# Patient Record
Sex: MALE | Race: WHITE | NOT HISPANIC OR LATINO | Employment: OTHER | URBAN - METROPOLITAN AREA
[De-identification: names, ages, dates, MRNs, and addresses within clinical notes are randomized per-mention and may not be internally consistent; named-entity substitution may affect disease eponyms.]

---

## 2017-06-09 ENCOUNTER — OFFICE VISIT (OUTPATIENT)
Dept: INTERNAL MEDICINE | Age: 62
End: 2017-06-09

## 2017-06-09 VITALS
OXYGEN SATURATION: 96 % | TEMPERATURE: 98.4 F | HEIGHT: 70 IN | HEART RATE: 70 BPM | DIASTOLIC BLOOD PRESSURE: 70 MMHG | BODY MASS INDEX: 29.92 KG/M2 | SYSTOLIC BLOOD PRESSURE: 122 MMHG | WEIGHT: 209 LBS

## 2017-06-09 DIAGNOSIS — Z12.11 SCREENING FOR COLON CANCER: ICD-10-CM

## 2017-06-09 DIAGNOSIS — M19.041 PRIMARY OSTEOARTHRITIS OF RIGHT HAND: Chronic | ICD-10-CM

## 2017-06-09 DIAGNOSIS — J44.9 CHRONIC OBSTRUCTIVE PULMONARY DISEASE, UNSPECIFIED COPD TYPE (HCC): Primary | Chronic | ICD-10-CM

## 2017-06-09 DIAGNOSIS — K21.9 GASTROESOPHAGEAL REFLUX DISEASE, ESOPHAGITIS PRESENCE NOT SPECIFIED: ICD-10-CM

## 2017-06-09 DIAGNOSIS — Z23 NEED FOR PNEUMOCOCCAL VACCINE: ICD-10-CM

## 2017-06-09 DIAGNOSIS — F17.219 CIGARETTE NICOTINE DEPENDENCE WITH NICOTINE-INDUCED DISORDER: Chronic | ICD-10-CM

## 2017-06-09 PROBLEM — J45.909 ASTHMA: Chronic | Status: ACTIVE | Noted: 2017-06-09

## 2017-06-09 PROBLEM — M19.049 PRIMARY OSTEOARTHRITIS OF HAND: Chronic | Status: ACTIVE | Noted: 2017-06-09

## 2017-06-09 PROCEDURE — 90471 IMMUNIZATION ADMIN: CPT | Performed by: INTERNAL MEDICINE

## 2017-06-09 PROCEDURE — 90670 PCV13 VACCINE IM: CPT | Performed by: INTERNAL MEDICINE

## 2017-06-09 PROCEDURE — 99204 OFFICE O/P NEW MOD 45 MIN: CPT | Performed by: INTERNAL MEDICINE

## 2017-06-09 RX ORDER — ASPIRIN 81 MG/1
81 TABLET ORAL DAILY
COMMUNITY
Start: 2017-06-09 | End: 2022-02-27 | Stop reason: HOSPADM

## 2017-06-09 NOTE — PROGRESS NOTES
"Mark L Sturgeon / 61 y.o. / male  2017    VITALS:    /70  Pulse 70  Temp 98.4 °F (36.9 °C)  Ht 70\" (177.8 cm)  Wt 209 lb (94.8 kg)  SpO2 96%  BMI 29.99 kg/m2  BP Readings from Last 3 Encounters:   17 122/70   16 136/77     Wt Readings from Last 3 Encounters:   17 209 lb (94.8 kg)   16 207 lb (93.9 kg)      Body mass index is 29.99 kg/(m^2).    CC: Main reason(s) for today's visit: Establish Care ( former Gertrudis wood pt)      HPI:    Patient is a 61 y.o. male who is here to establish care.     >45 years of smoking history. Sees pulmonologist for asthma/copd. Has h/o pulmonary nodule which has been stable on serial CT scans. C/o cough, wheezing, dyspnea. Has not committed to quitting smoking. Has Rx for Chantix at home.    H/o arthritis of right hand, has take etodolac in the past. Also takes ASA daily given by his wife due to concerns of heart attacks in his family hx. Prior h/o heart cath in  w/o significant blockage. Denies angina.     H/o intermittent heartburn, mom  of esophageal cancer, w/o dysphagia or early satiety.  H/o throat polyp resection.    Patient Care Team:  Gertrudis Wood MD as PCP - General (Internal Medicine)  Baylee Galaviz MD as Consulting Physician (Pulmonary Disease)  ____________________________________________________________________    ASSESSMENT & PLAN:    1. Chronic obstructive pulmonary disease, unspecified COPD type  Advised strongly to quit smoking.  Advised to use inhaler prescribed by pulmonologist.    2. Cigarette nicotine dependence with nicotine-induced disorder  Above     3. Gastroesophageal reflux disease, esophagitis presence not specified  - Ambulatory referral for Screening EGD (together w/ screening CLS)  - advised to stop smoking, reduce etoh    4. Primary osteoarthritis of right hand  Take tylenol as needed    5. Need for pneumococcal vaccine  - Pneumococcal Conjugate Vaccine 13-Valent All    6. Screening " for colon cancer  - Ambulatory Referral For Screening Colonoscopy       Summary/Discussion:     ·  Spent 50 minutes in face-to-face encounter time and >50% of time spent in counseling re: above medical issues.  -Counseled on smoking cessation and strongly advised him to quit. (5 minutes)  -Discussed treatment options including nicotine replacement therapy, Wellbutrin, and Chantix.  -Discussed risks/dangers of smoking including risk for various cancers, lung disease and cardiovascular disease.    -Advised to watch for suicidal ideations, depression, increased anxiety/agitation, mood swings. Instructed to contact me if any problems arise.  · -F/U with smoking status on next visit       Return in about 4 months (around 10/9/2017) for Annual physical.    No future appointments.    ____________________________________________________________________    REVIEW OF SYSTEMS    Review of Systems   Constitutional: Negative.    HENT: Positive for voice change (stable).    Eyes: Negative.    Respiratory: Positive for cough, shortness of breath and wheezing.    Cardiovascular: Negative.  Negative for chest pain.   Gastrointestinal: Negative.         Heartburn    Endocrine: Negative.    Genitourinary: Negative.    Musculoskeletal: Negative.  Arthralgias: right hand.   Skin: Negative.    Allergic/Immunologic: Negative.    Neurological: Negative.    Hematological: Negative.    Psychiatric/Behavioral: Negative.      Other: As noted per HPI      PHYSICAL EXAMINATION    Physical Exam   Constitutional: He appears well-nourished. No distress.   Obese     HENT:   Head: Normocephalic.   Right Ear: Right ear foreign body: wax.   Left Ear: External ear normal.   Mouth/Throat: Oropharynx is clear and moist.   Eyes: Conjunctivae are normal. No scleral icterus.   Neck: Neck supple. No tracheal deviation present. No thyromegaly present.   Cardiovascular: Normal rate, regular rhythm, normal heart sounds and intact distal pulses.  Exam reveals no  gallop and no friction rub.    No murmur heard.  Pulmonary/Chest: Effort normal and breath sounds normal. Wheezes: left    Abdominal: Soft. Bowel sounds are normal. He exhibits no distension and no mass. There is no tenderness. No hernia.   Musculoskeletal: He exhibits no edema or deformity.   Lymphadenopathy:     He has no cervical adenopathy.        Right: No supraclavicular adenopathy present.        Left: No supraclavicular adenopathy present.   Neurological: He is alert. He has normal reflexes.   Skin: Skin is intact. No rash noted. No cyanosis or erythema. No pallor. Nails show no clubbing.   Psychiatric: He has a normal mood and affect. His behavior is normal. Judgment and thought content normal. Cognition and memory are normal.       REVIEWED DATA:    Labs:   Lab Results   Component Value Date     08/29/2014    K 4.3 08/29/2014    BUN 15 08/29/2014    CREATININE 0.80 08/29/2014       No results found for: GLU, HGBA1C, MICROALBUR    No results found for: LDL, HDL, TRIG, CHOLHDLRATIO    No results found for: TSH, FREET4       Lab Results   Component Value Date    WBC 9.54 08/29/2014    HGB 14.8 08/29/2014     08/29/2014         Imaging:        Medical Tests:        Summary of old records / correspondence / consultant report:        Request outside records:    From prior PCP (sign medical release)    ______________________________________________________________________    No Known Allergies    Current Outpatient Prescriptions   Medication Sig Dispense Refill   • aspirin 81 MG EC tablet Take 1 tablet by mouth Daily.     • Multiple Vitamins-Minerals (MULTIVITAMIN PO) Take  by mouth.       No current facility-administered medications for this visit.        PFSH:     The following portions of the patient's history were reviewed and updated as appropriate: Allergies / Current Medications / Past Medical History / Surgical History / Social History / Family History    Patient Active Problem List    Diagnosis   • Primary osteoarthritis of hand   • COPD (chronic obstructive pulmonary disease)   • Cigarette nicotine dependence with nicotine-induced disorder       Past Medical History:   Diagnosis Date   • Asthma    • Pulmonary nodule        Past Surgical History:   Procedure Laterality Date   • HAND SURGERY     • THROAT SURGERY  2012    polyp removal   • TONSILLECTOMY     • VASECTOMY         Social History     Social History   • Marital status:      Spouse name: Zeenat*   • Number of children: 2   • Years of education: N/A     Occupational History   •  ([employer])      Social History Main Topics   • Smoking status: Current Every Day Smoker     Packs/day: 1.00     Years: 45.00     Types: Cigarettes   • Smokeless tobacco: Never Used   • Alcohol use 7.2 oz/week     12 Cans of beer per week   • Drug use: No   • Sexual activity: Yes     Partners: Female     Other Topics Concern   • None     Social History Narrative   • None       Family History   Problem Relation Age of Onset   • Esophageal cancer Mother 68     smoker   • Asthma Mother    • Heart attack Father 50     smoker   • Asthma Sister      smoker   • Lung disease Brother    • Asthma Maternal Grandmother    • Diabetes Maternal Grandmother    • Heart attack Maternal Grandfather 75         **Aidan Disclaimer:   Much of this encounter note is an electronic transcription/translation of spoken language to printed text. The electronic translation of spoken language may permit erroneous, or at times, nonsensical words or phrases to be inadvertently transcribed. Although I have reviewed the note for such errors, some may still exist.

## 2017-06-14 DIAGNOSIS — Z12.11 ENCOUNTER FOR SCREENING COLONOSCOPY: Primary | ICD-10-CM

## 2017-06-14 NOTE — H&P
Date: 2017     Patient: Mark L Sturgeon    : 1955   [id number]     CC:  Screening Colonoscopy    History:   The patient is a 61 y.o. male of Gertrudis Wood MD  who presents to discuss screening colonoscopy. The patient currently has no complaints.  Patient denies any history of nausea, abdominal pain, weight loss, change in bowel habits or rectal bleeding.  Patient denies melena, hematochezia or BRBPR.  No family history of ulcerative colitis, Crohn's disease, familial polyposis or colon cancer.    The following portions of the patient's history were reviewed and updated as appropriate: allergies, current medications, past family history, past medical history, past social history, past surgical history and problem list.    Past Medical History:   Diagnosis Date   • Asthma    • Pulmonary nodule       Past Surgical History:   Procedure Laterality Date   • HAND SURGERY     • THROAT SURGERY      polyp removal   • TONSILLECTOMY     • VASECTOMY       Medications:   (Not in a hospital admission)  Allergies: Review of patient's allergies indicates no known allergies.   Social History:  Social History     Social History   • Marital status:      Spouse name: Zeenat*   • Number of children: 2   • Years of education: N/A     Occupational History   •  ([employer])      Social History Main Topics   • Smoking status: Current Every Day Smoker     Packs/day: 1.00     Years: 45.00     Types: Cigarettes   • Smokeless tobacco: Never Used   • Alcohol use 7.2 oz/week     12 Cans of beer per week   • Drug use: No   • Sexual activity: Yes     Partners: Female     Other Topics Concern   • None     Social History Narrative      Family History   Problem Relation Age of Onset   • Esophageal cancer Mother 68     smoker   • Asthma Mother    • Heart attack Father 50     smoker   • Asthma Sister      smoker   • Lung disease Brother    • Asthma Maternal Grandmother    • Diabetes Maternal  Grandmother    • Heart attack Maternal Grandfather 75        Review of Systems:   General: Patient reports good health  Eyes: No eye problems  Ears, nose, mouth and throat: No rhinitis, no hearing problems, no chronic cough  Cardiovascular/heart: Denies palpitations, syncope or chest pain  Respiratory/lung: Denies shortness of breath, hemoptysis, or dyspnea on exertion   Genital/urinary: No frequency, hematuria or dysuria  Hematological/lymphatic: Denies anemia or other problems  Musculoskeletal: No joint pain, no defects  Skin: No psoriasis or other skin issues  Neurological: No seizures or other neurological problems  Psychiatric: None  Endocrine: Negative  Gastro-intestinal: No constipation, no diarrhea, no melena, no hematochezia    Physical Examination:  General: Alert and oriented x3 in no acute distress  HEENT: Normal cephalic, atraumatic, PERRLA, EOMI, sclera anicteric, moist mucous membranes, neck is supple, no JVD, no carotid bruits, no thyromegaly no adenopathy  Chest: CTA and percussion  CVA: RRR, normal S1-S2, no murmurs, no gallops or rubs  Abdomen: Positive BS, soft, nondistended, nontender, no rebound, no guarding, no hernias, no organomegaly and no masses  Extremities: Full range of motion, no clubbing, no cyanosis or edema  Neurovascular: Grossly intact    Impression:  61 y.o. male for screening colonoscopy.    Plan:  Patient is presenting for screening colonoscopy.  I have recommended that the patient undergo a screening colonoscopy in accordance of [employer] Society's guidelines.  I have discussed this procedure in detail with the patient.  I have discussed the risks, benefits and alternatives.  I have discussed the risk of anesthesia, bleeding and perforation.  Patient understands these risks, benefits and alternatives and wishes to proceed.      Norma Brady MD  General, Minimally Invasive and Endoscopic Surgery  [facility]    [address], [address]  [facility] [place]  [place], [address]  P: [phone]  F: [phone]    CC: Gertrudis Wood MD

## 2017-06-27 DIAGNOSIS — K21.00 GASTROESOPHAGEAL REFLUX DISEASE WITH ESOPHAGITIS: Primary | ICD-10-CM

## 2017-08-10 ENCOUNTER — TRANSCRIBE ORDERS (OUTPATIENT)
Dept: GASTROENTEROLOGY | Facility: CLINIC | Age: 62
End: 2017-08-10

## 2017-08-10 ENCOUNTER — TELEPHONE (OUTPATIENT)
Dept: GASTROENTEROLOGY | Facility: CLINIC | Age: 62
End: 2017-08-10

## 2017-08-10 DIAGNOSIS — Z12.11 ENCOUNTER FOR SCREENING FOR MALIGNANT NEOPLASM OF COLON: Primary | ICD-10-CM

## 2017-08-10 NOTE — TELEPHONE ENCOUNTER
Received OA paperwork from patient and noticed that Dr Tyler's referral was sent to Dr Brady.  Spoke with patient and he said that he wants Dr Lawson to do his colonoscopy not Dr Brady.  Patient said that Dr Tyler also felt that he should have an EGD but he is really not having any issues with Gerd.  Spoke with Dr Lawson and he said that patient does not have any indications for an EGD at this time.  Patient okay with just scheduling a colonoscopy.

## 2017-08-30 PROBLEM — Z12.11 ENCOUNTER FOR SCREENING FOR MALIGNANT NEOPLASM OF COLON: Status: ACTIVE | Noted: 2017-08-30

## 2017-09-28 DIAGNOSIS — Z00.00 PREVENTATIVE HEALTH CARE: Primary | ICD-10-CM

## 2017-10-03 LAB
25(OH)D3+25(OH)D2 SERPL-MCNC: 32 NG/ML (ref 30–100)
ALBUMIN SERPL-MCNC: 4.5 G/DL (ref 3.5–5.2)
ALBUMIN/GLOB SERPL: 1.6 G/DL
ALP SERPL-CCNC: 68 U/L (ref 39–117)
ALT SERPL-CCNC: 15 U/L (ref 1–41)
APPEARANCE UR: CLEAR
AST SERPL-CCNC: 14 U/L (ref 1–40)
BASOPHILS # BLD AUTO: 0.02 10*3/MM3 (ref 0–0.2)
BASOPHILS NFR BLD AUTO: 0.2 % (ref 0–1.5)
BILIRUB SERPL-MCNC: <0.2 MG/DL (ref 0.1–1.2)
BILIRUB UR QL STRIP: NEGATIVE
BUN SERPL-MCNC: 15 MG/DL (ref 8–23)
BUN/CREAT SERPL: 16.7 (ref 7–25)
CALCIUM SERPL-MCNC: 10.1 MG/DL (ref 8.6–10.5)
CHLORIDE SERPL-SCNC: 100 MMOL/L (ref 98–107)
CHOLEST SERPL-MCNC: 201 MG/DL (ref 0–200)
CHOLEST/HDLC SERPL: 4.19 {RATIO}
CO2 SERPL-SCNC: 25.8 MMOL/L (ref 22–29)
COLOR UR: YELLOW
CREAT SERPL-MCNC: 0.9 MG/DL (ref 0.76–1.27)
EOSINOPHIL # BLD AUTO: 0.2 10*3/MM3 (ref 0–0.7)
EOSINOPHIL NFR BLD AUTO: 2.2 % (ref 0.3–6.2)
ERYTHROCYTE [DISTWIDTH] IN BLOOD BY AUTOMATED COUNT: 12.8 % (ref 11.5–14.5)
GLOBULIN SER CALC-MCNC: 2.9 GM/DL
GLUCOSE SERPL-MCNC: 140 MG/DL (ref 65–99)
GLUCOSE UR QL: NEGATIVE
HBA1C MFR BLD: 5.92 % (ref 4.8–5.6)
HCT VFR BLD AUTO: 47.2 % (ref 40.4–52.2)
HCV AB S/CO SERPL IA: <0.1 S/CO RATIO (ref 0–0.9)
HDLC SERPL-MCNC: 48 MG/DL (ref 40–60)
HGB BLD-MCNC: 16 G/DL (ref 13.7–17.6)
HGB UR QL STRIP: NEGATIVE
IMM GRANULOCYTES # BLD: 0.04 10*3/MM3 (ref 0–0.03)
IMM GRANULOCYTES NFR BLD: 0.4 % (ref 0–0.5)
KETONES UR QL STRIP: NEGATIVE
LDLC SERPL CALC-MCNC: 125 MG/DL (ref 0–100)
LEUKOCYTE ESTERASE UR QL STRIP: NEGATIVE
LYMPHOCYTES # BLD AUTO: 2.5 10*3/MM3 (ref 0.9–4.8)
LYMPHOCYTES NFR BLD AUTO: 27.1 % (ref 19.6–45.3)
MCH RBC QN AUTO: 32.8 PG (ref 27–32.7)
MCHC RBC AUTO-ENTMCNC: 33.9 G/DL (ref 32.6–36.4)
MCV RBC AUTO: 96.7 FL (ref 79.8–96.2)
MONOCYTES # BLD AUTO: 0.86 10*3/MM3 (ref 0.2–1.2)
MONOCYTES NFR BLD AUTO: 9.3 % (ref 5–12)
NEUTROPHILS # BLD AUTO: 5.59 10*3/MM3 (ref 1.9–8.1)
NEUTROPHILS NFR BLD AUTO: 60.8 % (ref 42.7–76)
NITRITE UR QL STRIP: NEGATIVE
PH UR STRIP: 5.5 [PH] (ref 5–8)
PLATELET # BLD AUTO: 252 10*3/MM3 (ref 140–500)
POTASSIUM SERPL-SCNC: 4.6 MMOL/L (ref 3.5–5.2)
PROT SERPL-MCNC: 7.4 G/DL (ref 6–8.5)
PROT UR QL STRIP: NEGATIVE
PSA SERPL-MCNC: 1.04 NG/ML (ref 0–4)
RBC # BLD AUTO: 4.88 10*6/MM3 (ref 4.6–6)
SODIUM SERPL-SCNC: 141 MMOL/L (ref 136–145)
SP GR UR: 1.02 (ref 1–1.03)
T4 FREE SERPL-MCNC: 1.08 NG/DL (ref 0.93–1.7)
TESTOST SERPL-MCNC: 218 NG/DL (ref 264–916)
TRIGL SERPL-MCNC: 142 MG/DL (ref 0–150)
TSH SERPL DL<=0.005 MIU/L-ACNC: 2.68 MIU/ML (ref 0.27–4.2)
UROBILINOGEN UR STRIP-MCNC: NORMAL MG/DL
VLDLC SERPL CALC-MCNC: 28.4 MG/DL (ref 5–40)
WBC # BLD AUTO: 9.21 10*3/MM3 (ref 4.5–10.7)

## 2017-10-09 ENCOUNTER — OFFICE VISIT (OUTPATIENT)
Dept: INTERNAL MEDICINE | Age: 62
End: 2017-10-09

## 2017-10-09 VITALS
HEIGHT: 70 IN | WEIGHT: 212 LBS | BODY MASS INDEX: 30.35 KG/M2 | HEART RATE: 86 BPM | DIASTOLIC BLOOD PRESSURE: 76 MMHG | SYSTOLIC BLOOD PRESSURE: 124 MMHG | TEMPERATURE: 98.4 F | OXYGEN SATURATION: 96 %

## 2017-10-09 DIAGNOSIS — J44.9 CHRONIC OBSTRUCTIVE PULMONARY DISEASE, UNSPECIFIED COPD TYPE (HCC): Chronic | ICD-10-CM

## 2017-10-09 DIAGNOSIS — E78.00 PURE HYPERCHOLESTEROLEMIA: Chronic | ICD-10-CM

## 2017-10-09 DIAGNOSIS — F17.219 CIGARETTE NICOTINE DEPENDENCE WITH NICOTINE-INDUCED DISORDER: Chronic | ICD-10-CM

## 2017-10-09 DIAGNOSIS — K21.9 GASTROESOPHAGEAL REFLUX DISEASE, ESOPHAGITIS PRESENCE NOT SPECIFIED: ICD-10-CM

## 2017-10-09 DIAGNOSIS — R73.03 PREDIABETES: Chronic | ICD-10-CM

## 2017-10-09 DIAGNOSIS — Z00.00 ENCOUNTER FOR ANNUAL HEALTH EXAMINATION: Primary | ICD-10-CM

## 2017-10-09 DIAGNOSIS — Z00.00 PREVENTATIVE HEALTH CARE: Primary | ICD-10-CM

## 2017-10-09 PROBLEM — E78.5 HYPERLIPIDEMIA: Chronic | Status: ACTIVE | Noted: 2017-10-09

## 2017-10-09 PROCEDURE — 90471 IMMUNIZATION ADMIN: CPT | Performed by: INTERNAL MEDICINE

## 2017-10-09 PROCEDURE — 99396 PREV VISIT EST AGE 40-64: CPT | Performed by: INTERNAL MEDICINE

## 2017-10-09 PROCEDURE — 99214 OFFICE O/P EST MOD 30 MIN: CPT | Performed by: INTERNAL MEDICINE

## 2017-10-09 PROCEDURE — 90686 IIV4 VACC NO PRSV 0.5 ML IM: CPT | Performed by: INTERNAL MEDICINE

## 2017-10-09 NOTE — ASSESSMENT & PLAN NOTE
Maintain low fat/cholesterol diet.  Discussed possible need to start statin medication due to other risk factors. Handout given. He would like to think about it.

## 2017-10-09 NOTE — ASSESSMENT & PLAN NOTE
Spent additional 5 minutes on smoking cessation. Discussed all treatment options. Handout given. Instructed to call if interested in starting Wellbutrin / NRT. He is reluctant to take Chantix.

## 2017-10-09 NOTE — PROGRESS NOTES
Mikal LOWE Sturgeon / 62 y.o. / male  10/09/2017    CC: Main reason(s) for today's visit: Annual Exam      HPI:      Mikal presents for annual health maintenance visit.  Previous history of elevated fasting glucose without history of diabetes.  Still drinks moderately heavy.  Does not follow low carbohydrate diet. He had some cobbler and fried chicken the night before his most recent fasting blood work.  Ongoing gradual weight gain.  History of mild hyperlipidemia with strong family history of heart disease.  His father had a heart attack in his 50s.  Patient is still smoking about 1 pack per day for greater than 45 years.  He is followed by his pulmonologist for COPD.  He does not use Anoro on daily basis. C/o heartburn sx's and concerned about it due to mom's h/o esophageal cancer. Denies dysphagia sx's.     · Last health maintenance visit: 1 year ago  · General health: poor  · Lifestyle:  · Attempting to lose weight?: No   · Diet: needs significant improvement  · Exercise: needs significant improvement  · Tobacco: Regular use (~1 pack/day)   · Alcohol: regular (moderate)  · Work: Full-time  · Reproductive health:  · Sexually active?: Yes   · Concern for STD?: No   · Sexual problems?: No problems   · Sees Urologist?:No   · Depression Screening:      PHQ-2/PHQ-9 Depression Screening 6/9/2017   Little interest or pleasure in doing things 0   Feeling down, depressed, or hopeless 0   Total Score 0         PHQ-2: 0 (Not depressed)     PHQ-9:     Patient Care Team:  Edmundo Tyler MD as PCP - General (Internal Medicine)  Baylee Galaviz MD as Consulting Physician (Pulmonary Disease)  ______________________________________________________________________    ALLERGIES  No Known Allergies     MEDICATIONS  Current Outpatient Prescriptions   Medication Sig Dispense Refill   • aspirin 81 MG EC tablet Take 1 tablet by mouth Daily.     • Multiple Vitamins-Minerals (MULTIVITAMIN PO) Take  by mouth.       No current facility-administered  medications for this visit.        PFSH:     The following portions of the patient's history were reviewed and updated as appropriate: Allergies / Current Medications / Past Medical History / Surgical History / Social History / Family History    PROBLEM LIST   Patient Active Problem List   Diagnosis   • Primary osteoarthritis of hand   • COPD (chronic obstructive pulmonary disease)   • Cigarette nicotine dependence with nicotine-induced disorder   • Encounter for screening for malignant neoplasm of colon   • Prediabetes   • Hyperlipidemia   • Gastroesophageal reflux disease       PAST MEDICAL HISTORY  Past Medical History:   Diagnosis Date   • Asthma    • COPD (chronic obstructive pulmonary disease)    • Pulmonary nodule        SURGICAL HISTORY  Past Surgical History:   Procedure Laterality Date   • HAND SURGERY     • THROAT SURGERY  2012    polyp removal   • TONSILLECTOMY     • VASECTOMY         SOCIAL HISTORY  Social History     Social History   • Marital status:      Spouse name: Zeenat*   • Number of children: 2   • Years of education: N/A     Occupational History   •  ([employer])      Social History Main Topics   • Smoking status: Current Every Day Smoker     Packs/day: 1.00     Years: 45.00     Types: Cigarettes   • Smokeless tobacco: Never Used   • Alcohol use 7.2 oz/week     12 Cans of beer per week   • Drug use: No   • Sexual activity: Yes     Partners: Female     Other Topics Concern   • None     Social History Narrative   • None       FAMILY HISTORY  Family History   Problem Relation Age of Onset   • Esophageal cancer Mother 68     smoker   • Asthma Mother    • Heart attack Father 50     smoker   • Asthma Sister      smoker   • Lung disease Brother    • Asthma Maternal Grandmother    • Diabetes Maternal Grandmother    • Heart attack Maternal Grandfather 75   • Colon cancer Neg Hx    • Prostate cancer Neg Hx        IMMUNIZATION HISTORY  Immunization History   Administered Date(s)  "Administered   • Flu Vaccine Quad PF >36MO 10/09/2017   • Pneumococcal Conjugate 13-Valent 06/09/2017       ______________________________________________________________________    REVIEW OF SYSTEMS    Review of Systems   Constitutional: Negative.    HENT: Negative.    Eyes: Negative.    Respiratory: Positive for shortness of breath and wheezing.    Cardiovascular: Negative.  Negative for chest pain.   Gastrointestinal: Negative.         Heartburn   Endocrine: Negative.  Negative for polydipsia and polyuria.   Genitourinary: Negative.  Negative for hematuria.   Musculoskeletal: Negative.    Skin: Negative.    Allergic/Immunologic: Negative.    Neurological: Negative.    Hematological: Negative.    Psychiatric/Behavioral: Negative.        VITALS:    /76  Pulse 86  Temp 98.4 °F (36.9 °C)  Ht 70\" (177.8 cm)  Wt 212 lb (96.2 kg)  SpO2 96%  BMI 30.42 kg/m2    BP Readings from Last 3 Encounters:   10/09/17 124/76   06/09/17 122/70   09/04/16 136/77     Wt Readings from Last 3 Encounters:   10/09/17 212 lb (96.2 kg)   06/09/17 209 lb (94.8 kg)   09/04/16 207 lb (93.9 kg)      Body mass index is 30.42 kg/(m^2).    PHYSICAL EXAMINATION    Physical Exam   Constitutional: He is oriented to person, place, and time. He appears well-nourished. No distress.   Obese     HENT:   Head: Normocephalic.   Right Ear: External ear normal.   Left Ear: External ear normal.   Nose: Nose normal.   Mouth/Throat: Oropharynx is clear and moist.   Eyes: Conjunctivae and EOM are normal. Pupils are equal, round, and reactive to light. No scleral icterus.   Neck: Normal range of motion. Neck supple. No tracheal deviation present. No thyromegaly present.   Cardiovascular: Normal rate, regular rhythm, normal heart sounds and intact distal pulses.  Exam reveals no gallop and no friction rub.    No murmur heard.  Pulmonary/Chest: Effort normal. No accessory muscle usage. No tachypnea. No respiratory distress. He has decreased breath sounds. " He has wheezes. He has no rhonchi. He has no rales. He exhibits no mass and no tenderness. Right breast exhibits no mass. Left breast exhibits no mass.   Abdominal: Soft. Bowel sounds are normal. He exhibits no distension and no mass. There is no hepatosplenomegaly. There is no tenderness. No hernia. Hernia confirmed negative in the ventral area.   Genitourinary: Rectum normal, prostate normal, testes normal and penis normal.   Musculoskeletal: Normal range of motion. He exhibits no edema or deformity.   Lymphadenopathy:     He has no cervical adenopathy.     He has no axillary adenopathy.        Right: No inguinal and no supraclavicular adenopathy present.        Left: No inguinal and no supraclavicular adenopathy present.   Neurological: He is alert and oriented to person, place, and time. He has normal reflexes. He displays normal reflexes. No cranial nerve deficit. He exhibits normal muscle tone. Coordination normal.   Skin: Skin is warm and intact. No rash noted. No cyanosis or erythema. No pallor. Nails show no clubbing.   Psychiatric: He has a normal mood and affect. His behavior is normal. Judgment and thought content normal. Cognition and memory are normal.       REVIEWED DATA    Labs:    Lab Results   Component Value Date     10/02/2017    K 4.6 10/02/2017    AST 14 10/02/2017    ALT 15 10/02/2017    BUN 15 10/02/2017    CREATININE 0.90 10/02/2017    CREATININE 0.80 08/29/2014    EGFRIFNONA 85 10/02/2017    EGFRIFAFRI 103 10/02/2017       Lab Results   Component Value Date     (H) 10/02/2017    HGBA1C 5.92 (H) 10/02/2017    TSH 2.680 10/02/2017    FREET4 1.08 10/02/2017       Lab Results   Component Value Date    PSA 1.040 10/02/2017    TESTOSTEROTT 218 (L) 10/02/2017       Lab Results   Component Value Date     (H) 10/02/2017    HDL 48 10/02/2017    TRIG 142 10/02/2017    CHOLHDLRATIO 4.19 10/02/2017       No components found for: [id number]    Lab Results   Component Value Date    WBC  9.21 10/02/2017    HGB 16.0 10/02/2017    MCV 96.7 (H) 10/02/2017     10/02/2017       Lab Results   Component Value Date    PROTEIN Negative 10/02/2017    GLUCOSEU Negative 10/02/2017    BLOODU Negative 10/02/2017    NITRITEU Negative 10/02/2017    LEUKOCYTESUR Negative 10/02/2017          Lab Results   Component Value Date    HEPCVIRUSABY <0.1 10/02/2017       Imaging:         Medical Tests:       ______________________________________________________________________    ASSESSMENT & PLAN    ANNUAL WELLNESS EXAM / PHYSICAL     Other medical problems addressed today:  Problem List Items Addressed This Visit     Prediabetes (Chronic)    Current Assessment & Plan     Discussed prediabetes, need for dietary/lifestyle modifications, weight loss.   Decrease Etoh. Consider metformin.          Hyperlipidemia (Chronic)    Current Assessment & Plan     Maintain low fat/cholesterol diet.  Discussed possible need to start statin medication due to other risk factors. Handout given. He would like to think about it.          Gastroesophageal reflux disease (Chronic)    Current Assessment & Plan     Arrange for UGI series. If abnormal, proceed w/ EGD.          Relevant Orders    FL upper gi single contrast w kub    COPD (chronic obstructive pulmonary disease) (Chronic)    Current Assessment & Plan     Sees pulmonologist. Recommended / counseled on smoking cessation. Instructed him to use Anoro inhaler daily. Ask pulmonologist for rescue inhaler.            Cigarette nicotine dependence with nicotine-induced disorder (Chronic)    Current Assessment & Plan     Spent additional 5 minutes on smoking cessation. Discussed all treatment options. Handout given. Instructed to call if interested in starting Wellbutrin / NRT. He is reluctant to take Chantix.            Other Visit Diagnoses     Encounter for annual health examination    -  Primary          Summary/Discussion:     · In addition to completion of CPE, additional 25  minutes were spent on above medical issues and >50% of that time was spent in counseling on those issues.       Return in about 6 months (around 4/9/2018) for Prediabetes, Hyperlipidemia, smoking cessation.    Future Appointments  Date Time Provider Department Center   4/9/2018 2:20 PM MD [facility] None         HEALTHCARE MAINTENANCE ISSUES:    Cancer Screening:  · Colon: Initial/Next screening at age: DUE NOW (He will schedule for 1 month)  · Repeat colonoscopy N/A at this time  · Prostate: Performed today and recommended annual screening  · Testicular: Recommended monthly self exam  · Skin: Monthly self skin examination, annual exam by health professional  · Lung:   · Other:    Screening Labs & Tests:  · Lab results reviewed & discussed with with patient or orders placed today.  · EKG:  · Vascular Screening:   · DEXA (75+ or risk factors):   · HEP C (If born 7950-1716 or risk factors): Negative screen    Immunization/Vaccinations (to be given today unless deferred by patient)  · Tetanus/Pertussis: Up to date  · Pneumovax: Not needed at this time  · Prevnar 13: Up to date  · Zostavax: Up to date  · Influenza: Give flu shot today.   · Other:     Lifestyle Counseling:  · Lifestyle Modifications: Attempt to lose weight, Improve dietary compliance, Begin progressive aerobic exercise program 3-5 days a week, Maintain a low sugar/carbohydrate diet, Follow a low fat, low cholesterol diet, Make dinner the lightest meal of day, Decrease or avoid alcohol intake and Quit smoking  · Safety Issues: Always wear seatbelt, Avoid texting while driving   · Use sunscreen, regular skin examination  · Recommended annual dental/vision examination.  · Emotional/Stress/Sleep: Reviewed and  given when appropriate      Health Maintenance   Topic Date Due   • COLONOSCOPY  06/29/2017   •  Lung Cancer Screening  02/22/2018   • PROSTATE CANCER SCREENING  10/02/2018   • LIPID PANEL  10/02/2018   • TDAP/TD VACCINES (2 -  Td) 05/29/2023   • PNEUMOCOCCAL VACCINE (19-64 MEDIUM RISK)  Completed   • HEPATITIS C SCREENING  Completed   • INFLUENZA VACCINE  Completed   • ZOSTER VACCINE  Completed         **Aidan Disclaimer:   Much of this encounter note is an electronic transcription/translation of spoken language to printed text. The electronic translation of spoken language may permit erroneous, or at times, nonsensical words or phrases to be inadvertently transcribed. Although I have reviewed the note for such errors, some may still exist.

## 2017-10-09 NOTE — ASSESSMENT & PLAN NOTE
Sees pulmonologist. Recommended / counseled on smoking cessation. Instructed him to use Anoro inhaler daily. Ask pulmonologist for rescue inhaler.

## 2017-10-09 NOTE — ASSESSMENT & PLAN NOTE
Discussed prediabetes, need for dietary/lifestyle modifications, weight loss.   Decrease Etoh. Consider metformin.

## 2017-10-23 ENCOUNTER — HOSPITAL ENCOUNTER (OUTPATIENT)
Dept: GENERAL RADIOLOGY | Facility: HOSPITAL | Age: 62
Discharge: HOME | End: 2017-10-23
Admitting: INTERNAL MEDICINE

## 2017-10-23 PROCEDURE — 74247: CPT

## 2017-10-23 NOTE — PROGRESS NOTES
"Call patient (READ EXACTLY AS WRITTEN) with his test result(s) and mail the results to him if MyChart is NOT active.    UGI series showed the followin. Some thickening of the esophageal and stomach region mucosal wall. Possibly due to esophagitis / gastritis (Inflammation).  2. Small sliding hiatal hernia (causing reflux)  3. A large diverticulum of the duodenum (an abnormal \"pouch\" in the portion of the small intestinal wall).    I would recommend proceeding with an EGD for direct visualization of these abnormal findings.  Place referral to GI (1-2 weeks) for EGD."

## 2017-10-24 DIAGNOSIS — K20.90 ESOPHAGITIS: Primary | ICD-10-CM

## 2017-11-07 ENCOUNTER — TRANSCRIBE ORDERS (OUTPATIENT)
Dept: GASTROENTEROLOGY | Facility: CLINIC | Age: 62
End: 2017-11-07

## 2017-11-07 DIAGNOSIS — Z12.11 ENCOUNTER FOR SCREENING FOR MALIGNANT NEOPLASM OF COLON: Primary | ICD-10-CM

## 2017-11-07 DIAGNOSIS — K22.89 THICKENING OF ESOPHAGUS: ICD-10-CM

## 2017-11-07 DIAGNOSIS — K21.9 GASTROESOPHAGEAL REFLUX DISEASE, ESOPHAGITIS PRESENCE NOT SPECIFIED: ICD-10-CM

## 2017-11-21 PROBLEM — K22.89 THICKENING OF ESOPHAGUS: Status: ACTIVE | Noted: 2017-11-21

## 2018-01-22 ENCOUNTER — ANESTHESIA (OUTPATIENT)
Dept: GASTROENTEROLOGY | Facility: HOSPITAL | Age: 63
End: 2018-01-22

## 2018-01-22 ENCOUNTER — HOSPITAL ENCOUNTER (OUTPATIENT)
Facility: HOSPITAL | Age: 63
Setting detail: HOSPITAL OUTPATIENT SURGERY
Discharge: HOME | End: 2018-01-22
Attending: INTERNAL MEDICINE | Admitting: INTERNAL MEDICINE

## 2018-01-22 ENCOUNTER — ANESTHESIA EVENT (OUTPATIENT)
Dept: GASTROENTEROLOGY | Facility: HOSPITAL | Age: 63
End: 2018-01-22

## 2018-01-22 VITALS
HEART RATE: 84 BPM | WEIGHT: 213.9 LBS | OXYGEN SATURATION: 93 % | BODY MASS INDEX: 29.94 KG/M2 | SYSTOLIC BLOOD PRESSURE: 104 MMHG | RESPIRATION RATE: 14 BRPM | HEIGHT: 71 IN | TEMPERATURE: 97.9 F | DIASTOLIC BLOOD PRESSURE: 71 MMHG

## 2018-01-22 DIAGNOSIS — K22.89 THICKENING OF ESOPHAGUS: ICD-10-CM

## 2018-01-22 DIAGNOSIS — K21.9 GASTROESOPHAGEAL REFLUX DISEASE, ESOPHAGITIS PRESENCE NOT SPECIFIED: ICD-10-CM

## 2018-01-22 DIAGNOSIS — Z12.11 ENCOUNTER FOR SCREENING FOR MALIGNANT NEOPLASM OF COLON: ICD-10-CM

## 2018-01-22 PROCEDURE — 43239 EGD BIOPSY SINGLE/MULTIPLE: CPT | Performed by: INTERNAL MEDICINE

## 2018-01-22 PROCEDURE — 25010000002 PROPOFOL 10 MG/ML EMULSION: Performed by: ANESTHESIOLOGY

## 2018-01-22 PROCEDURE — 45378 DIAGNOSTIC COLONOSCOPY: CPT | Performed by: INTERNAL MEDICINE

## 2018-01-22 PROCEDURE — 88305 TISSUE EXAM BY PATHOLOGIST: CPT | Performed by: INTERNAL MEDICINE

## 2018-01-22 PROCEDURE — 88312 SPECIAL STAINS GROUP 1: CPT | Performed by: INTERNAL MEDICINE

## 2018-01-22 PROCEDURE — S0260 H&P FOR SURGERY: HCPCS | Performed by: INTERNAL MEDICINE

## 2018-01-22 RX ORDER — PROPOFOL 10 MG/ML
VIAL (ML) INTRAVENOUS AS NEEDED
Status: DISCONTINUED | OUTPATIENT
Start: 2018-01-22 | End: 2018-01-22 | Stop reason: SURG

## 2018-01-22 RX ORDER — SODIUM CHLORIDE 0.9 % (FLUSH) 0.9 %
3 SYRINGE (ML) INJECTION AS NEEDED
Status: DISCONTINUED | OUTPATIENT
Start: 2018-01-22 | End: 2018-01-22 | Stop reason: HOSPADM

## 2018-01-22 RX ORDER — SODIUM CHLORIDE, SODIUM LACTATE, POTASSIUM CHLORIDE, CALCIUM CHLORIDE 600; 310; 30; 20 MG/100ML; MG/100ML; MG/100ML; MG/100ML
1000 INJECTION, SOLUTION INTRAVENOUS CONTINUOUS PRN
Status: DISCONTINUED | OUTPATIENT
Start: 2018-01-22 | End: 2018-01-22 | Stop reason: HOSPADM

## 2018-01-22 RX ORDER — LIDOCAINE HYDROCHLORIDE 10 MG/ML
0.5 INJECTION, SOLUTION INFILTRATION; PERINEURAL ONCE AS NEEDED
Status: DISCONTINUED | OUTPATIENT
Start: 2018-01-22 | End: 2018-01-22 | Stop reason: HOSPADM

## 2018-01-22 RX ORDER — ASCORBIC ACID 500 MG
500 TABLET ORAL DAILY
COMMUNITY
End: 2022-11-08 | Stop reason: ALTCHOICE

## 2018-01-22 RX ORDER — PROPOFOL 10 MG/ML
VIAL (ML) INTRAVENOUS CONTINUOUS PRN
Status: DISCONTINUED | OUTPATIENT
Start: 2018-01-22 | End: 2018-01-22 | Stop reason: SURG

## 2018-01-22 RX ORDER — LIDOCAINE HYDROCHLORIDE 20 MG/ML
INJECTION, SOLUTION INFILTRATION; PERINEURAL AS NEEDED
Status: DISCONTINUED | OUTPATIENT
Start: 2018-01-22 | End: 2018-01-22 | Stop reason: SURG

## 2018-01-22 RX ORDER — LANSOPRAZOLE 30 MG/1
30 CAPSULE, DELAYED RELEASE ORAL DAILY
Qty: 90 CAPSULE | Refills: 3 | Status: SHIPPED | OUTPATIENT
Start: 2018-01-22 | End: 2019-05-13

## 2018-01-22 RX ADMIN — PROPOFOL 270 MG: 10 INJECTION, EMULSION INTRAVENOUS at 08:11

## 2018-01-22 RX ADMIN — SODIUM CHLORIDE, POTASSIUM CHLORIDE, SODIUM LACTATE AND CALCIUM CHLORIDE 1000 ML: 600; 310; 30; 20 INJECTION, SOLUTION INTRAVENOUS at 07:35

## 2018-01-22 RX ADMIN — SODIUM CHLORIDE, POTASSIUM CHLORIDE, SODIUM LACTATE AND CALCIUM CHLORIDE: 600; 310; 30; 20 INJECTION, SOLUTION INTRAVENOUS at 08:07

## 2018-01-22 RX ADMIN — PROPOFOL 140 MCG/KG/MIN: 10 INJECTION, EMULSION INTRAVENOUS at 08:15

## 2018-01-22 RX ADMIN — LIDOCAINE HYDROCHLORIDE 50 MG: 20 INJECTION, SOLUTION INFILTRATION; PERINEURAL at 08:11

## 2018-01-22 NOTE — BRIEF OP NOTE
COLONOSCOPY, ESOPHAGOGASTRODUODENOSCOPY  Progress Note    Mark L Sturgeon  1/22/2018    Pre-op Diagnosis:   Encounter for screening for malignant neoplasm of colon [Z12.11]  Thickening of esophagus [K22.8]  Gastroesophageal reflux disease, esophagitis presence not specified [K21.9]       Post-Op Diagnosis Codes:     * Diverticulosis [K57.90]     * Internal hemorrhoids [K64.8]     * Gastritis and duodenitis [K29.90]     * Esophagitis determined by endoscopy [K20.9]    Procedure/CPT® Codes:      Procedure(s):  COLONOSCOPY TO CECUM/TI  ESOPHAGOGASTRODUODENOSCOPY WITH BIOPSY    Surgeon(s):  Cuate Lawson MD    Anesthesia: Monitor Anesthesia Care    Staff:   Endo Technician: Gissel Hadley  Endo Nurse: Meli Landry RN    Estimated Blood Loss: minimal    Urine Voided: * No values recorded between 1/22/2018  8:04 AM and 1/22/2018  8:38 AM *    Specimens:                  ID Type Source Tests Collected by Time Destination   A : ANTRAL BX Tissue Stomach TISSUE EXAM Cuate Lawson MD 1/22/2018 0837          Drains:           Findings: Pandiverticulosis  Internal hemorrhoids  Reflux esophagitis  Gastritis  Duodenitis  Hiatal hernia    Complications: None      Cuate Lawson MD     Date: 1/22/2018  Time: 8:41 AM

## 2018-01-22 NOTE — H&P
[facility]  Pre Procedure History & Physical    Chief Complaint:   GERD and colon screening    Subjective     HPI:   Patient 62-year-old male with history of COPD reflux with family history significant for mother with esophageal cancer.  Patient reports significant smoking 1 pack per day and alcohol intake concerned with family history with mother here for screening.  Patient denies GI complaints on current regimen.  He did undergo an upper GI revealing some esophagitis and gastritis.  Patient now for EGD colonoscopy    Past Medical History:   Past Medical History:   Diagnosis Date   • Asthma    • COPD (chronic obstructive pulmonary disease)    • Heartburn    • Pulmonary nodule        Past Surgical History:  Past Surgical History:   Procedure Laterality Date   • HAND SURGERY     • THROAT SURGERY  2012    polyp removal   • TONSILLECTOMY     • VASECTOMY         Family History:  Family History   Problem Relation Age of Onset   • Esophageal cancer Mother 68     smoker   • Asthma Mother    • Heart attack Father 50     smoker   • Asthma Sister      smoker   • Lung disease Brother    • Asthma Maternal Grandmother    • Diabetes Maternal Grandmother    • Heart attack Maternal Grandfather 75   • Colon cancer Neg Hx    • Prostate cancer Neg Hx        Social History:   reports that he has been smoking Cigarettes.  He has a 45.00 pack-year smoking history. He has never used smokeless tobacco. He reports that he drinks about 7.2 oz of alcohol per week  He reports that he does not use illicit drugs.    Medications:   Prescriptions Prior to Admission   Medication Sig Dispense Refill Last Dose   • aspirin 81 MG EC tablet Take 1 tablet by mouth Daily.   Past Week at Unknown time   • Cholecalciferol (VITAMIN D PO) Take 1 tablet by mouth Daily.   Past Week at Unknown time   • Cyanocobalamin (VITAMIN B-12 PO) Take 1 tablet by mouth Daily.   Past Week at Unknown time   • Multiple Vitamins-Minerals (MULTIVITAMIN  "PO) Take  by mouth.   Past Week at Unknown time   • vitamin C (ASCORBIC ACID) 500 MG tablet Take 500 mg by mouth Daily.   Past Week at Unknown time       Allergies:  Codeine    ROS:    Pertinent items are noted in HPI     Objective     Blood pressure 128/94, pulse 80, temperature 97.9 °F (36.6 °C), temperature source Oral, resp. rate 16, height 180.3 cm (71\"), weight 97 kg (213 lb 14.4 oz), SpO2 93 %.    Physical Exam   Constitutional: Pt is oriented to person, place, and time and well-developed, well-nourished, and in no distress.   Mouth/Throat: Oropharynx is clear and moist.   Neck: Normal range of motion.   Cardiovascular: Normal rate, regular rhythm and normal heart sounds.    Pulmonary/Chest: Effort normal and breath sounds normal.   Abdominal: Soft. Nontender  Skin: Skin is warm and dry.   Psychiatric: Mood, memory, affect and judgment normal.     Assessment/Plan     Diagnosis:  GERD  Colonoscopy screening      Anticipated Surgical Procedure:  EGD and colonoscopy    The risks, benefits, and alternatives of this procedure have been discussed with the patient or the responsible party- the patient understands and agrees to proceed.                                                          "

## 2018-01-22 NOTE — ANESTHESIA PREPROCEDURE EVALUATION
Anesthesia Evaluation     Patient summary reviewed and Nursing notes reviewed   no history of anesthetic complications:  NPO Solid Status: > 8 hours  NPO Liquid Status: > 8 hours     Airway   Mallampati: II  TM distance: >3 FB  Neck ROM: full  no difficulty expected  Dental - normal exam     Pulmonary - normal exam    breath sounds clear to auscultation  (+) a smoker Current Abstained day of surgery, COPD, asthma,     ROS comment: pulmonary nodule  Cardiovascular - normal exam    Rhythm: regular  Rate: normal    (+) hyperlipidemia      Neuro/Psych  GI/Hepatic/Renal/Endo    (+)  GERD,     Musculoskeletal (-) negative ROS    Abdominal  - normal exam   Substance History - negative use     OB/GYN negative ob/gyn ROS         Other                                                Anesthesia Plan    ASA 3     MAC     intravenous induction   Anesthetic plan and risks discussed with patient.

## 2018-01-22 NOTE — ANESTHESIA POSTPROCEDURE EVALUATION
"Patient: Mark L Sturgeon    Procedure Summary     Date Anesthesia Start Anesthesia Stop Room / Location    01/22/18 0807 0843  [facility] /  [facility]       Procedure Diagnosis Surgeon Provider    COLONOSCOPY TO CECUM/TI (N/A ); ESOPHAGOGASTRODUODENOSCOPY WITH BIOPSY (N/A Esophagus) Diverticulosis; Internal hemorrhoids; Gastritis and duodenitis; Esophagitis determined by endoscopy  (Encounter for screening for malignant neoplasm of colon [Z12.11]; Thickening of esophagus [K22.8]; Gastroesophageal reflux disease, esophagitis presence not specified [K21.9]) MD Rosa Chiu MD          Anesthesia Type: MAC  Last vitals  BP   104/71 (01/22/18 0903)   Temp   36.6 °C (97.9 °F) (01/22/18 0853)   Pulse   84 (01/22/18 0903)   Resp   14 (01/22/18 0903)     SpO2   93 % (01/22/18 0903)     Post Anesthesia Care and Evaluation    Patient location during evaluation: PACU  Patient participation: complete - patient participated  Level of consciousness: awake  Pain score: 0  Pain management: adequate  Airway patency: patent  Anesthetic complications: No anesthetic complications    Cardiovascular status: acceptable  Respiratory status: acceptable  Hydration status: acceptable    Comments: Blood pressure 104/71, pulse 84, temperature 36.6 °C (97.9 °F), temperature source Oral, resp. rate 14, height 180.3 cm (71\"), weight 97 kg (213 lb 14.4 oz), SpO2 93 %.    No anesthesia care post op    "

## 2018-01-22 NOTE — PLAN OF CARE
Problem: Patient Care Overview (Adult)  Goal: Plan of Care Review  Outcome: Ongoing (interventions implemented as appropriate)   01/22/18 0729   Coping/Psychosocial Response Interventions   Plan Of Care Reviewed With patient   Patient Care Overview   Progress progress toward functional goals as expected     Goal: Adult Individualization and Mutuality  Outcome: Ongoing (interventions implemented as appropriate)   01/22/18 0729   Individualization   Patient Specific Preferences Mikal     Goal: Discharge Needs Assessment  Outcome: Ongoing (interventions implemented as appropriate)   01/22/18 0729   Discharge Needs Assessment   Concerns To Be Addressed denies needs/concerns at this time   Discharge Disposition home or self-care   Living Environment   Transportation Available car       Problem: GI Endoscopy (Adult)  Goal: Signs and Symptoms of Listed Potential Problems Will be Absent or Manageable (GI Endoscopy)  Outcome: Ongoing (interventions implemented as appropriate)   01/22/18 0729   GI Endoscopy   Problems Assessed (GI Endoscopy) all   Problems Present (GI Endoscopy) none

## 2018-01-23 LAB
CYTO UR: NORMAL
LAB AP CASE REPORT: NORMAL
Lab: NORMAL
PATH REPORT.FINAL DX SPEC: NORMAL
PATH REPORT.GROSS SPEC: NORMAL

## 2018-02-15 ENCOUNTER — TELEPHONE (OUTPATIENT)
Dept: GASTROENTEROLOGY | Facility: CLINIC | Age: 63
End: 2018-02-15

## 2018-02-15 RX ORDER — AMOXICILLIN 500 MG/1
1000 CAPSULE ORAL 2 TIMES DAILY
Qty: 40 CAPSULE | Refills: 0 | Status: SHIPPED | OUTPATIENT
Start: 2018-02-15 | End: 2018-02-25

## 2018-02-15 RX ORDER — LANSOPRAZOLE 30 MG/1
30 CAPSULE, DELAYED RELEASE ORAL 2 TIMES DAILY
Qty: 20 CAPSULE | Refills: 0 | Status: SHIPPED | OUTPATIENT
Start: 2018-02-15 | End: 2018-02-25

## 2018-02-15 RX ORDER — CLARITHROMYCIN 500 MG/1
500 TABLET, COATED ORAL 2 TIMES DAILY
Qty: 20 TABLET | Refills: 0 | Status: SHIPPED | OUTPATIENT
Start: 2018-02-15 | End: 2018-02-25

## 2018-02-15 NOTE — TELEPHONE ENCOUNTER
----- Message from Cuate Lawson MD sent at 2/5/2018  9:21 AM EST -----  Patient H. pylori positive gastritis.  Please give Prevpac for 10 days.  Follow-up in 2 months.

## 2018-02-15 NOTE — TELEPHONE ENCOUNTER
Pt returned call. Advised that per Dr Lawson: the biopsy from his stomach came back positive for inflammation and a bacteria called H pylori. Explained diagnosis to pt and how/why we treat this.  Advised that MD recommends to treat with Prevpac: amoxicillin, clarithromycin and lansoprazole. Advised he will take each med twice daily for 10 days. Verified not allergic to any of the meds. Advised MD would like to see him back in the office in 2 months. Advised will to the meds into his pharmacy for him. Pt verb understanding and will call back tomorrow once he is back in town to make appt.   Meds e-scribed.

## 2018-03-27 ENCOUNTER — OFFICE VISIT (OUTPATIENT)
Dept: GASTROENTEROLOGY | Facility: CLINIC | Age: 63
End: 2018-03-27

## 2018-03-27 VITALS
SYSTOLIC BLOOD PRESSURE: 122 MMHG | DIASTOLIC BLOOD PRESSURE: 76 MMHG | WEIGHT: 221 LBS | HEIGHT: 71 IN | BODY MASS INDEX: 30.94 KG/M2 | TEMPERATURE: 98.8 F

## 2018-03-27 DIAGNOSIS — A04.8 BACTERIAL INFECTION DUE TO HELICOBACTER PYLORI: Primary | ICD-10-CM

## 2018-03-27 PROCEDURE — 99213 OFFICE O/P EST LOW 20 MIN: CPT | Performed by: INTERNAL MEDICINE

## 2018-03-27 RX ORDER — CHLORAL HYDRATE 500 MG
CAPSULE ORAL
COMMUNITY
End: 2022-05-04

## 2018-03-27 NOTE — PROGRESS NOTES
Chief Complaint   Patient presents with   • Follow-up     Hx H.Pylori positive (biopsy) on 02/05/18       Mark L Sturgeon is a  62 y.o. male here for a follow up visit for H. pylori gastritis.    HPI    Patient 62-year-old male with history of COPD and GERD status post EGD with esophagitis gastritis and duodenitis.  Patient positive for H. pylori on biopsy underwent Prevpac therapy for treatment.  Patient currently off all medications doing well from perspective of his GI tract.  Patient here to follow-up for possible H. pylori testing.    Past Medical History:   Diagnosis Date   • Asthma    • COPD (chronic obstructive pulmonary disease)    • Heartburn    • Pulmonary nodule          Current Outpatient Prescriptions:   •  aspirin 81 MG EC tablet, Take 1 tablet by mouth Daily., Disp: , Rfl:   •  Multiple Vitamins-Minerals (MULTIVITAMIN PO), Take  by mouth., Disp: , Rfl:   •  Omega-3 Fatty Acids (FISH OIL) 1000 MG capsule capsule, Take  by mouth., Disp: , Rfl:   •  Cholecalciferol (VITAMIN D PO), Take 1 tablet by mouth Daily., Disp: , Rfl:   •  Cyanocobalamin (VITAMIN B-12 PO), Take 1 tablet by mouth Daily., Disp: , Rfl:   •  lansoprazole (PREVACID) 30 MG capsule, Take 1 capsule by mouth Daily., Disp: 90 capsule, Rfl: 3  •  vitamin C (ASCORBIC ACID) 500 MG tablet, Take 500 mg by mouth Daily., Disp: , Rfl:     Allergies   Allergen Reactions   • Codeine Nausea Only       Social History     Social History   • Marital status:      Spouse name: Zeenat*   • Number of children: 2   • Years of education: N/A     Occupational History   •  ([employer])      Social History Main Topics   • Smoking status: Current Every Day Smoker     Packs/day: 1.00     Years: 45.00     Types: Cigarettes   • Smokeless tobacco: Never Used   • Alcohol use 7.2 oz/week     12 Cans of beer per week   • Drug use: No   • Sexual activity: Yes     Partners: Female     Other Topics Concern   • Not on file     Social History  Narrative   • No narrative on file       Family History   Problem Relation Age of Onset   • Esophageal cancer Mother 68     smoker   • Asthma Mother    • Heart attack Father 50     smoker   • Asthma Sister      smoker   • Lung disease Brother    • Asthma Maternal Grandmother    • Diabetes Maternal Grandmother    • Heart attack Maternal Grandfather 75   • Colon cancer Neg Hx    • Prostate cancer Neg Hx        Review of Systems   Constitutional: Negative.    HENT: Positive for congestion and postnasal drip. Negative for trouble swallowing and voice change.    Respiratory: Negative.    Cardiovascular: Negative.    Gastrointestinal: Negative.    Skin: Negative.    Hematological: Negative.        Vitals:    03/27/18 1429   BP: 122/76   Temp: 98.8 °F (37.1 °C)       Physical Exam   Constitutional: He is oriented to person, place, and time. He appears well-developed and well-nourished.   HENT:   Head: Normocephalic and atraumatic.   Eyes: Pupils are equal, round, and reactive to light. No scleral icterus.   Abdominal: Soft. Bowel sounds are normal.   Neurological: He is alert and oriented to person, place, and time.   Skin: Skin is warm and dry.   Psychiatric: He has a normal mood and affect. His behavior is normal.   Vitals reviewed.      No visits with results within 2 Month(s) from this visit.   Latest known visit with results is:   Admission on 01/22/2018, Discharged on 01/22/2018   Component Date Value Ref Range Status   • Case Report 01/23/2018    Final                    Value:Surgical Pathology Report                         Case: [id number]                                  Authorizing Provider:  Cuate Lawson MD     Collected:           01/22/2018 08:37 AM          Ordering Location:     [facility]  Received:            01/22/2018 09:53 AM                                 [facility]                                                                  Pathologist:           King Gr                                                                             MD                                                                           Specimen:    Stomach, ANTRAL BX                                                                        • Final Diagnosis 01/23/2018    Final                    Value:This result contains rich text formatting which cannot be displayed here.   • Gross Description 01/23/2018    Final                    Value:This result contains rich text formatting which cannot be displayed here.   • Microscopic Description 01/23/2018    Final                    Value:This result contains rich text formatting which cannot be displayed here.       Mikal was seen today for follow-up.    Diagnoses and all orders for this visit:    Bacterial infection due to Helicobacter pylori  -     H. Pylori Breath Test - Breath, Lung; Future      Patient 62-year-old male with history of erosive duodenitis gastritis and reflux esophagitis found H. pylori positive.  Patient status post 10 days of Prevpac therapy now off PPI for about a month.  At this point would recommend H. pylori breath testing to confirm eradication and follow-up clinically.

## 2018-03-28 LAB — UREA BREATH TEST QL: NEGATIVE

## 2018-04-03 ENCOUNTER — TELEPHONE (OUTPATIENT)
Dept: GASTROENTEROLOGY | Facility: CLINIC | Age: 63
End: 2018-04-03

## 2018-04-03 NOTE — TELEPHONE ENCOUNTER
----- Message from Avril Robertson sent at 4/3/2018 10:04 AM EDT -----  Regarding: PT CALLED FOR RESULTS FROM LAST TUESDAY   Contact: [phone]  Where he had to blow into a balloon ??

## 2018-04-10 ENCOUNTER — OFFICE VISIT (OUTPATIENT)
Dept: INTERNAL MEDICINE | Age: 63
End: 2018-04-10

## 2018-04-10 VITALS
BODY MASS INDEX: 30.66 KG/M2 | WEIGHT: 219 LBS | SYSTOLIC BLOOD PRESSURE: 136 MMHG | HEIGHT: 71 IN | HEART RATE: 78 BPM | TEMPERATURE: 98.3 F | DIASTOLIC BLOOD PRESSURE: 84 MMHG | OXYGEN SATURATION: 97 %

## 2018-04-10 DIAGNOSIS — J44.9 CHRONIC OBSTRUCTIVE PULMONARY DISEASE, UNSPECIFIED COPD TYPE (HCC): Primary | Chronic | ICD-10-CM

## 2018-04-10 DIAGNOSIS — K21.00 GASTROESOPHAGEAL REFLUX DISEASE WITH ESOPHAGITIS: Chronic | ICD-10-CM

## 2018-04-10 DIAGNOSIS — F17.219 CIGARETTE NICOTINE DEPENDENCE WITH NICOTINE-INDUCED DISORDER: Chronic | ICD-10-CM

## 2018-04-10 PROCEDURE — 99406 BEHAV CHNG SMOKING 3-10 MIN: CPT | Performed by: INTERNAL MEDICINE

## 2018-04-10 PROCEDURE — 99214 OFFICE O/P EST MOD 30 MIN: CPT | Performed by: INTERNAL MEDICINE

## 2018-04-10 RX ORDER — ALBUTEROL SULFATE 90 UG/1
2 AEROSOL, METERED RESPIRATORY (INHALATION) EVERY 4 HOURS PRN
Qty: 2 INHALER | Refills: 5 | Status: SHIPPED | OUTPATIENT
Start: 2018-04-10 | End: 2018-04-12 | Stop reason: CLARIF

## 2018-04-10 NOTE — ASSESSMENT & PLAN NOTE
Counseled on smoking cessation for 5 minutes: advised patient to quit, handout given on 5 steps to prepare for smoking cessation, discussed nicotine replacement therapy, discussed buproprion, discussed Chantix and discussed watching for significant mood changes, watching for suicidial ideations/depression/anxiety/agitiation

## 2018-04-10 NOTE — PROGRESS NOTES
"[facility]  SYMONE BRICEÑO M.D.      Mark L Sturgeon / 62 y.o. / male  04/10/2018      MEDICATIONS  Current Outpatient Prescriptions   Medication Sig Dispense Refill   • aspirin 81 MG EC tablet Take 1 tablet by mouth Daily.     • Cholecalciferol (VITAMIN D PO) Take 1 tablet by mouth Daily.     • Cyanocobalamin (VITAMIN B-12 PO) Take 1 tablet by mouth Daily.     • lansoprazole (PREVACID) 30 MG capsule Take 1 capsule by mouth Daily. 90 capsule 3   • Multiple Vitamins-Minerals (MULTIVITAMIN PO) Take  by mouth.     • Omega-3 Fatty Acids (FISH OIL) 1000 MG capsule capsule Take  by mouth.     • umeclidinium-vilanterol (ANORO ELLIPTA) 62.5-25 MCG/INH aerosol powder  inhaler Inhale 1 puff Daily.     • vitamin C (ASCORBIC ACID) 500 MG tablet Take 500 mg by mouth Daily.       No current facility-administered medications for this visit.          VITALS:    Visit Vitals  /84   Pulse 78   Temp 98.3 °F (36.8 °C)   Ht 180.3 cm (70.98\")   Wt 99.3 kg (219 lb)   SpO2 97%   BMI 30.56 kg/m²       BP Readings from Last 3 Encounters:   04/10/18 136/84   03/27/18 122/76   01/22/18 104/71     Wt Readings from Last 3 Encounters:   04/10/18 99.3 kg (219 lb)   03/27/18 100 kg (221 lb)   01/22/18 97 kg (213 lb 14.4 oz)      Body mass index is 30.56 kg/m².    CC:  Main reason(s) for today's visit: Diabetes; Hyperlipidemia; and Nicotine Dependence      HPI:     Prediabetes:  Prediabetes: Weight has not changed significantly and not on metformin. Most recent A1c level(s):   Lab Results   Component Value Date    HGBA1C 5.92 (H) 10/02/2017      Underwent EGD for abnormal UGI findings/GERD which showed erosive esophagitis. On lansoprazole 30 mg daily.     COPD followed by pulmo and uses Anoro sporadically. Has ongoing chronic dyspnea. Still smokes.     Patient Care Team:  Edmundo Briceño MD as PCP - General (Internal Medicine)  Baylee Galaviz MD as Consulting Physician (Pulmonary " Disease)  ____________________________________________________________________    ASSESSMENT & PLAN:    Problem List Items Addressed This Visit        High    COPD (chronic obstructive pulmonary disease) - Primary (Chronic)    Current Assessment & Plan     Advised to use Anoro inhaler daily.   Sent Rx for Ventolin HFA.  Advised to quit smoking.            Relevant Medications    umeclidinium-vilanterol (ANORO ELLIPTA) 62.5-25 MCG/INH aerosol powder  inhaler    albuterol (PROVENTIL HFA;VENTOLIN HFA) 108 (90 Base) MCG/ACT inhaler       Medium    Cigarette nicotine dependence with nicotine-induced disorder (Chronic)    Gastroesophageal reflux disease (Chronic)    Relevant Medications    lansoprazole (PREVACID) 30 MG capsule      Other Visit Diagnoses    None.       No orders of the defined types were placed in this encounter.      Summary/Discussion:      Return in about 6 months (around 10/10/2018) for Annual physical.    Future Appointments  Date Time Provider Department Center   10/2/2018 8:10 AM [facility] None   10/10/2018 1:00 PM Edmundo Tyler MD [facility] None       ____________________________________________________________________    REVIEW OF SYSTEMS    Review of Systems  Constitutional neg  Resp dyspnea  CV neg  GI neg for dysphagia or early satiety      PHYSICAL EXAMINATION    Physical Exam  Constitutional  No distress  Cardiovascular Rate  normal . Rhythm: regular . Heart sounds:  Normal  Pulmonary/Chest  Effort normal. Breath sounds:  Decreased breath sounds throughout with exp wheezing  Psychiatric  Alert. Judgment and thought content normal. Mood normal    REVIEWED DATA:    Labs:     Lab Results   Component Value Date     10/02/2017    K 4.6 10/02/2017    AST 14 10/02/2017    ALT 15 10/02/2017    BUN 15 10/02/2017    CREATININE 0.90 10/02/2017    CREATININE 0.80 08/29/2014    EGFRIFNONA 85 10/02/2017    EGFRIFAFRI 103 10/02/2017       Lab Results   Component Value Date     HGBA1C 5.92 (H) 10/02/2017    GLUCOSE 142 (H) 08/29/2014       Lab Results   Component Value Date     (H) 10/02/2017    HDL 48 10/02/2017    TRIG 142 10/02/2017    CHOLHDLRATIO 4.19 10/02/2017       Lab Results   Component Value Date    TSH 2.680 10/02/2017    FREET4 1.08 10/02/2017       Lab Results   Component Value Date    WBC 9.21 10/02/2017    HGB 16.0 10/02/2017    HGB 14.8 08/29/2014     10/02/2017       Imaging:         Medical Tests:   EGD:  - LA Grade C reflux esophagitis.  - Small hiatal hernia.  - Erythematous mucosa in the stomach. Biopsied.  - Duodenal erosions without bleeding.  - The examination was otherwise normal.      Summary of old records / correspondence / consultant report:         Request outside records:         ALLERGIES  Allergies   Allergen Reactions   • Codeine Nausea Only        PFSH:     The following portions of the patient's history were reviewed and updated as appropriate: Allergies / Current Medications / Past Medical History / Surgical History / Social History / Family History    PROBLEM LIST   Patient Active Problem List   Diagnosis   • Primary osteoarthritis of hand   • COPD (chronic obstructive pulmonary disease)   • Cigarette nicotine dependence with nicotine-induced disorder   • Encounter for screening for malignant neoplasm of colon   • Prediabetes   • Hyperlipidemia   • Gastroesophageal reflux disease   • Thickening of esophagus       PAST MEDICAL HISTORY  Past Medical History:   Diagnosis Date   • Asthma    • COPD (chronic obstructive pulmonary disease)    • Heartburn    • Pulmonary nodule        SURGICAL HISTORY  Past Surgical History:   Procedure Laterality Date   • COLONOSCOPY N/A 1/22/2018    Diverticulosis in the sigmoid colon, in the descending colon, in the transverse colon and in the ascending colon, non-bleeding internal hemorrhoids   • ENDOSCOPY N/A 1/22/2018    LA Grade C reflux esophagitis, small hiatal hernia, erythematous mucosa in the stomach,  duodenal erosions without bleeding   • HAND SURGERY     • THROAT SURGERY  2012    polyp removal   • TONSILLECTOMY     • VASECTOMY         SOCIAL HISTORY  Social History     Social History   • Marital status:      Spouse name: Zeenat*   • Number of children: 2     Occupational History   •  ([employer])      Social History Main Topics   • Smoking status: Current Every Day Smoker     Packs/day: 1.00     Years: 45.00     Types: Cigarettes   • Smokeless tobacco: Never Used   • Alcohol use 7.2 oz/week     12 Cans of beer per week   • Drug use: No   • Sexual activity: Yes     Partners: Female     Other Topics Concern   • Not on file       FAMILY HISTORY  Family History   Problem Relation Age of Onset   • Esophageal cancer Mother 68     smoker   • Asthma Mother    • Heart attack Father 50     smoker   • Asthma Sister      smoker   • Lung disease Brother    • Asthma Maternal Grandmother    • Diabetes Maternal Grandmother    • Heart attack Maternal Grandfather 75   • Colon cancer Neg Hx    • Prostate cancer Neg Hx          **Dragon Disclaimer:   Much of this encounter note is an electronic transcription/translation of spoken language to printed text. The electronic translation of spoken language may permit erroneous, or at times, nonsensical words or phrases to be inadvertently transcribed. Although I have reviewed the note for such errors, some may still exist.

## 2018-04-12 RX ORDER — ALBUTEROL SULFATE 90 UG/1
2 AEROSOL, METERED RESPIRATORY (INHALATION) EVERY 4 HOURS PRN
Qty: 6.7 G | Refills: 3 | Status: SHIPPED | OUTPATIENT
Start: 2018-04-12 | End: 2020-03-16 | Stop reason: ALTCHOICE

## 2018-10-02 DIAGNOSIS — Z00.00 PREVENTATIVE HEALTH CARE: Primary | ICD-10-CM

## 2018-10-03 LAB
ALBUMIN SERPL-MCNC: 4.5 G/DL (ref 3.5–5.2)
ALBUMIN/GLOB SERPL: 1.7 G/DL
ALP SERPL-CCNC: 70 U/L (ref 39–117)
ALT SERPL-CCNC: 17 U/L (ref 1–41)
APPEARANCE UR: CLEAR
AST SERPL-CCNC: 19 U/L (ref 1–40)
BASOPHILS # BLD AUTO: 0.03 10*3/MM3 (ref 0–0.2)
BASOPHILS NFR BLD AUTO: 0.3 % (ref 0–1.5)
BILIRUB SERPL-MCNC: 0.4 MG/DL (ref 0.1–1.2)
BILIRUB UR QL STRIP: NEGATIVE
BUN SERPL-MCNC: 13 MG/DL (ref 8–23)
BUN/CREAT SERPL: 16.5 (ref 7–25)
CALCIUM SERPL-MCNC: 9.6 MG/DL (ref 8.6–10.5)
CHLORIDE SERPL-SCNC: 101 MMOL/L (ref 98–107)
CHOLEST SERPL-MCNC: 181 MG/DL (ref 0–200)
CHOLEST/HDLC SERPL: 3.69 {RATIO}
CO2 SERPL-SCNC: 24.5 MMOL/L (ref 22–29)
COLOR UR: YELLOW
CREAT SERPL-MCNC: 0.79 MG/DL (ref 0.76–1.27)
EOSINOPHIL # BLD AUTO: 0.29 10*3/MM3 (ref 0–0.7)
EOSINOPHIL NFR BLD AUTO: 3.2 % (ref 0.3–6.2)
ERYTHROCYTE [DISTWIDTH] IN BLOOD BY AUTOMATED COUNT: 13.3 % (ref 11.5–14.5)
GLOBULIN SER CALC-MCNC: 2.7 GM/DL
GLUCOSE SERPL-MCNC: 141 MG/DL (ref 65–99)
GLUCOSE UR QL: NEGATIVE
HBA1C MFR BLD: 6.3 % (ref 4.8–5.6)
HCT VFR BLD AUTO: 45.2 % (ref 40.4–52.2)
HDLC SERPL-MCNC: 49 MG/DL (ref 40–60)
HGB BLD-MCNC: 15.5 G/DL (ref 13.7–17.6)
HGB UR QL STRIP: NEGATIVE
IMM GRANULOCYTES # BLD: 0.04 10*3/MM3 (ref 0–0.03)
IMM GRANULOCYTES NFR BLD: 0.4 % (ref 0–0.5)
KETONES UR QL STRIP: NEGATIVE
LDLC SERPL CALC-MCNC: 101 MG/DL (ref 0–100)
LEUKOCYTE ESTERASE UR QL STRIP: NEGATIVE
LYMPHOCYTES # BLD AUTO: 2.26 10*3/MM3 (ref 0.9–4.8)
LYMPHOCYTES NFR BLD AUTO: 25.2 % (ref 19.6–45.3)
MCH RBC QN AUTO: 32 PG (ref 27–32.7)
MCHC RBC AUTO-ENTMCNC: 34.3 G/DL (ref 32.6–36.4)
MCV RBC AUTO: 93.4 FL (ref 79.8–96.2)
MONOCYTES # BLD AUTO: 0.9 10*3/MM3 (ref 0.2–1.2)
MONOCYTES NFR BLD AUTO: 10 % (ref 5–12)
NEUTROPHILS # BLD AUTO: 5.5 10*3/MM3 (ref 1.9–8.1)
NEUTROPHILS NFR BLD AUTO: 61.3 % (ref 42.7–76)
NITRITE UR QL STRIP: NEGATIVE
PH UR STRIP: 5.5 [PH] (ref 5–8)
PLATELET # BLD AUTO: 219 10*3/MM3 (ref 140–500)
POTASSIUM SERPL-SCNC: 4.6 MMOL/L (ref 3.5–5.2)
PROT SERPL-MCNC: 7.2 G/DL (ref 6–8.5)
PROT UR QL STRIP: NEGATIVE
PSA SERPL-MCNC: 1.5 NG/ML (ref 0–4)
RBC # BLD AUTO: 4.84 10*6/MM3 (ref 4.6–6)
SODIUM SERPL-SCNC: 139 MMOL/L (ref 136–145)
SP GR UR: 1.02 (ref 1–1.03)
T4 FREE SERPL-MCNC: 1.2 NG/DL (ref 0.93–1.7)
TESTOST SERPL-MCNC: 177 NG/DL (ref 264–916)
TRIGL SERPL-MCNC: 155 MG/DL (ref 0–150)
TSH SERPL DL<=0.005 MIU/L-ACNC: 2.87 MIU/ML (ref 0.27–4.2)
UROBILINOGEN UR STRIP-MCNC: NORMAL MG/DL
VLDLC SERPL CALC-MCNC: 31 MG/DL (ref 5–40)
WBC # BLD AUTO: 8.98 10*3/MM3 (ref 4.5–10.7)

## 2018-10-10 ENCOUNTER — OFFICE VISIT (OUTPATIENT)
Dept: INTERNAL MEDICINE | Age: 63
End: 2018-10-10

## 2018-10-10 VITALS
TEMPERATURE: 98.4 F | SYSTOLIC BLOOD PRESSURE: 138 MMHG | DIASTOLIC BLOOD PRESSURE: 90 MMHG | OXYGEN SATURATION: 98 % | HEART RATE: 88 BPM | HEIGHT: 71 IN | WEIGHT: 210 LBS | BODY MASS INDEX: 29.4 KG/M2

## 2018-10-10 DIAGNOSIS — R73.03 PREDIABETES: Chronic | ICD-10-CM

## 2018-10-10 DIAGNOSIS — F17.219 CIGARETTE NICOTINE DEPENDENCE WITH NICOTINE-INDUCED DISORDER: Chronic | ICD-10-CM

## 2018-10-10 DIAGNOSIS — N42.9 ABNORMAL PROSTATE ON PHYSICAL EXAMINATION: ICD-10-CM

## 2018-10-10 DIAGNOSIS — E78.00 PURE HYPERCHOLESTEROLEMIA: Chronic | ICD-10-CM

## 2018-10-10 DIAGNOSIS — Z00.00 ENCOUNTER FOR ANNUAL HEALTH EXAMINATION: Primary | ICD-10-CM

## 2018-10-10 PROBLEM — I25.10 CORONARY ARTERY DISEASE INVOLVING NATIVE CORONARY ARTERY OF NATIVE HEART WITHOUT ANGINA PECTORIS: Chronic | Status: ACTIVE | Noted: 2018-10-10

## 2018-10-10 PROCEDURE — 90471 IMMUNIZATION ADMIN: CPT | Performed by: INTERNAL MEDICINE

## 2018-10-10 PROCEDURE — 99213 OFFICE O/P EST LOW 20 MIN: CPT | Performed by: INTERNAL MEDICINE

## 2018-10-10 PROCEDURE — G0009 ADMIN PNEUMOCOCCAL VACCINE: HCPCS | Performed by: INTERNAL MEDICINE

## 2018-10-10 PROCEDURE — 90732 PPSV23 VACC 2 YRS+ SUBQ/IM: CPT | Performed by: INTERNAL MEDICINE

## 2018-10-10 PROCEDURE — 90632 HEPA VACCINE ADULT IM: CPT | Performed by: INTERNAL MEDICINE

## 2018-10-10 PROCEDURE — 99397 PER PM REEVAL EST PAT 65+ YR: CPT | Performed by: INTERNAL MEDICINE

## 2018-10-10 RX ORDER — METFORMIN HYDROCHLORIDE 500 MG/1
TABLET, EXTENDED RELEASE ORAL
Qty: 60 TABLET | Refills: 3 | Status: SHIPPED | OUTPATIENT
Start: 2018-10-10 | End: 2019-04-05 | Stop reason: SDUPTHER

## 2018-10-10 RX ORDER — ROSUVASTATIN CALCIUM 10 MG/1
10 TABLET, COATED ORAL DAILY
Qty: 30 TABLET | Refills: 3 | Status: SHIPPED | OUTPATIENT
Start: 2018-10-10 | End: 2019-04-05 | Stop reason: SDUPTHER

## 2018-10-10 NOTE — ASSESSMENT & PLAN NOTE
Lab Results   Component Value Date    HGBA1C 6.30 (H) 10/02/2018    HGBA1C 5.92 (H) 10/02/2017      Start metformin  mg BID. Maintain a low sugar/starch/carbohydrate diet and exercise regularly. Wt loss advised.

## 2018-10-10 NOTE — ASSESSMENT & PLAN NOTE
Right prostate firmness on exam. Referral to urology for examination.   Lab Results   Component Value Date    PSA 1.500 10/02/2018    PSA 1.040 10/02/2017

## 2018-10-10 NOTE — ASSESSMENT & PLAN NOTE
Currently smokes 1 PPD  Duration of smokin years  Prior cessation efforts: no prior attempts  Counseled on smoking cessation for 5 minutes: advised patient to quit  Follow-up in 3 months

## 2018-10-10 NOTE — PROGRESS NOTES
[facility] INTERNAL MEDICINE  SYMONE BRICEÑO M.D.      Mark L Sturgeon / 63 y.o. / male  10/10/2018    CC: Annual Exam      HPI:      Mikal presents for annual health maintenance visit.  A1c has risen to 6.3, prior history of prediabetes.  Stills smokes 1 pack per day. Previous heart cath in 2014 showed mild/early CAD.    · Last health maintenance visit: 1 year ago  · General health: poor  · Lifestyle:  · Attempting to lose weight?: No   · Diet: could be better  · Exercise: could be better  · Tobacco: Regular use (~1 pack/day)   · Alcohol: regular (moderate)  · Work: Full-time  · Reproductive health:  · Sexually active?: Yes   · Concern for STD?: No   · Sexual problems?: No problems   · Sees Urologist?: No   · Depression Screening:      PHQ-2/PHQ-9 Depression Screening 10/10/2018   Little interest or pleasure in doing things 0   Feeling down, depressed, or hopeless 0   Total Score 0         PHQ-2: 0 (Not depressed)     PHQ-9:     Patient Care Team:  Edmundo Briceño MD as PCP - General (Internal Medicine)  Baylee Galaviz MD as Consulting Physician (Pulmonary Disease)  ______________________________________________________________________    ALLERGIES  Allergies   Allergen Reactions   • Codeine Nausea Only        MEDICATIONS  Current Outpatient Prescriptions   Medication Sig Dispense Refill   • albuterol (PROVENTIL HFA;VENTOLIN HFA) 108 (90 Base) MCG/ACT inhaler Inhale 2 puffs Every 4 (Four) Hours As Needed for Wheezing. 6.7 g 3   • aspirin 81 MG EC tablet Take 1 tablet by mouth Daily.     • Cholecalciferol (VITAMIN D PO) Take 1 tablet by mouth Daily.     • Cyanocobalamin (VITAMIN B-12 PO) Take 1 tablet by mouth Daily.     • Multiple Vitamins-Minerals (MULTIVITAMIN PO) Take  by mouth.     • umeclidinium-vilanterol (ANORO ELLIPTA) 62.5-25 MCG/INH aerosol powder  inhaler Inhale 1 puff Daily.     • vitamin C (ASCORBIC ACID) 500 MG tablet Take 500 mg by mouth Daily.     • lansoprazole (PREVACID) 30 MG capsule Take 1 capsule by mouth  Daily. 90 capsule 3   • Omega-3 Fatty Acids (FISH OIL) 1000 MG capsule capsule Take  by mouth.         PFSH:     The following portions of the patient's history were reviewed and updated as appropriate: Allergies / Current Medications / Past Medical History / Surgical History / Social History / Family History    PROBLEM LIST   Patient Active Problem List   Diagnosis   • Primary osteoarthritis of hand   • COPD (chronic obstructive pulmonary disease) (CMS/[facility])   • Cigarette nicotine dependence with nicotine-induced disorder   • Prediabetes   • Hyperlipidemia   • Gastroesophageal reflux disease with esophagitis   • Coronary artery disease involving native coronary artery of native heart without angina pectoris   • Abnormal prostate on physical examination       PAST MEDICAL HISTORY  Past Medical History:   Diagnosis Date   • Asthma    • COPD (chronic obstructive pulmonary disease) (CMS/[facility])    • Pulmonary nodule        SURGICAL HISTORY  Past Surgical History:   Procedure Laterality Date   • COLONOSCOPY N/A 1/22/2018    Diverticulosis in the sigmoid colon, in the descending colon, in the transverse colon and in the ascending colon, non-bleeding internal hemorrhoids   • ENDOSCOPY N/A 1/22/2018    LA Grade C reflux esophagitis, small hiatal hernia, erythematous mucosa in the stomach, duodenal erosions without bleeding   • HAND SURGERY     • THROAT SURGERY  2012    polyp removal   • TONSILLECTOMY     • VASECTOMY         SOCIAL HISTORY  Social History     Social History   • Marital status:      Spouse name: Zeenat*   • Number of children: 2     Occupational History   •  ([employer])      Social History Main Topics   • Smoking status: Current Every Day Smoker     Packs/day: 1.00     Years: 45.00     Types: Cigarettes   • Smokeless tobacco: Never Used   • Alcohol use 7.2 oz/week     12 Cans of beer per week   • Drug use: No   • Sexual activity: Yes     Partners: Female     Other Topics Concern   • Not  "on file       FAMILY HISTORY  Family History   Problem Relation Age of Onset   • Esophageal cancer Mother 68        smoker   • Asthma Mother    • Heart attack Father 50        smoker   • Asthma Sister         smoker   • Lung disease Brother    • Asthma Maternal Grandmother    • Diabetes Maternal Grandmother    • Heart attack Maternal Grandfather 75   • Colon cancer Neg Hx    • Prostate cancer Neg Hx        IMMUNIZATION HISTORY  Immunization History   Administered Date(s) Administered   • Flu Vaccine Intradermal Quad 18-64YR 09/26/2018   • Flu Vaccine Quad PF >36MO 10/09/2017   • Hepatitis A 04/01/2018   • Pneumococcal Conjugate 13-Valent (PCV13) 06/09/2017   • Tdap 10/10/2012       ______________________________________________________________________    REVIEW OF SYSTEMS    Review of Systems   Constitutional: Negative.         Lost 9 lbs    HENT: Negative.    Eyes: Negative.    Respiratory: Positive for shortness of breath (copd).    Cardiovascular: Negative.  Negative for chest pain.   Gastrointestinal: Negative.    Endocrine: Negative.    Genitourinary: Negative.  Negative for decreased urine volume, difficulty urinating and hematuria.   Musculoskeletal: Negative.    Skin: Negative.    Allergic/Immunologic: Negative.    Neurological: Negative.    Hematological: Negative.    Psychiatric/Behavioral: Negative.          VITALS:    Visit Vitals  /90   Pulse 88   Temp 98.4 °F (36.9 °C)   Ht 180.3 cm (70.98\")   Wt 95.3 kg (210 lb)   SpO2 98%   BMI 29.30 kg/m²       BP Readings from Last 3 Encounters:   10/10/18 138/90   04/10/18 136/84   03/27/18 122/76     Wt Readings from Last 3 Encounters:   10/10/18 95.3 kg (210 lb)   04/10/18 99.3 kg (219 lb)   03/27/18 100 kg (221 lb)      Body mass index is 29.3 kg/m².    PHYSICAL EXAMINATION    Physical Exam   Constitutional: He is oriented to person, place, and time. He appears well-nourished. No distress.   Overweight    HENT:   Head: Normocephalic.   Right Ear: External " ear normal.   Left Ear: External ear normal.   Nose: Nose normal.   Mouth/Throat: Oropharynx is clear and moist.   Eyes: Pupils are equal, round, and reactive to light. Conjunctivae and EOM are normal. No scleral icterus.   Neck: Normal range of motion. Neck supple. No tracheal deviation present. No thyromegaly present.   Cardiovascular: Normal rate, regular rhythm, normal heart sounds and intact distal pulses.  Exam reveals no gallop and no friction rub.    No murmur heard.  Pulmonary/Chest: Effort normal. He has wheezes (right lung field).   Abdominal: Soft. Normal appearance and bowel sounds are normal. He exhibits no distension and no mass. There is no hepatosplenomegaly. There is no tenderness. No hernia.   Obese    Genitourinary: Testes normal and penis normal. Prostate is enlarged (right side is firm (asymmetric from left side)). Prostate is not tender.   Musculoskeletal: Normal range of motion. He exhibits no edema or deformity.   Lymphadenopathy:     He has no cervical adenopathy.     He has no axillary adenopathy.        Right: No inguinal and no supraclavicular adenopathy present.        Left: No inguinal and no supraclavicular adenopathy present.   Neurological: He is alert and oriented to person, place, and time. He has normal reflexes. He displays normal reflexes. No cranial nerve deficit. He exhibits normal muscle tone. Coordination normal.   Skin: Skin is intact. No lesion (Negative for suspicious skin lesions/growths) and no rash noted. No cyanosis or erythema. No pallor. Nails show no clubbing.   No jaundice  No suspicious skin lesions.    Psychiatric: He has a normal mood and affect. His behavior is normal. Judgment and thought content normal. Cognition and memory are normal.         REVIEWED DATA    Labs:    Lab Results   Component Value Date     10/02/2018    K 4.6 10/02/2018    AST 19 10/02/2018    ALT 17 10/02/2018    BUN 13 10/02/2018    CREATININE 0.79 10/02/2018    CREATININE 0.90  10/02/2017    CREATININE 0.80 08/29/2014    EGFRIFNONA 98 10/02/2018    EGFRIFAFRI 119 10/02/2018       Lab Results   Component Value Date    GLUCOSE 142 (H) 08/29/2014    HGBA1C 6.30 (H) 10/02/2018    HGBA1C 5.92 (H) 10/02/2017    TSH 2.870 10/02/2018    FREET4 1.20 10/02/2018       Lab Results   Component Value Date    PSA 1.500 10/02/2018    PSA 1.040 10/02/2017    TESTOSTEROTT 177 (L) 10/02/2018       Lab Results   Component Value Date     (H) 10/02/2018    HDL 49 10/02/2018    TRIG 155 (H) 10/02/2018    CHOLHDLRATIO 3.69 10/02/2018       No components found for: [id number]    Lab Results   Component Value Date    WBC 9.21 10/02/2017    HGB 15.5 10/02/2018    MCV 93.4 10/02/2018     10/02/2018       Lab Results   Component Value Date    PROTEIN Negative 10/02/2018    GLUCOSEU Negative 10/02/2018    BLOODU Negative 10/02/2018    NITRITEU Negative 10/02/2018    LEUKOCYTESUR Negative 10/02/2018          Lab Results   Component Value Date    HEPCVIRUSABY <0.1 10/02/2017       Imaging:         Medical Tests:       ______________________________________________________________________    ASSESSMENT & PLAN    ANNUAL WELLNESS EXAM / PHYSICAL     Other medical problems addressed today:  Problem List Items Addressed This Visit        High    Prediabetes (Chronic)    Current Assessment & Plan     Lab Results   Component Value Date    HGBA1C 6.30 (H) 10/02/2018    HGBA1C 5.92 (H) 10/02/2017      Start metformin  mg BID. Maintain a low sugar/starch/carbohydrate diet and exercise regularly. Wt loss advised.          Relevant Medications    metFORMIN ER (GLUCOPHAGE-XR) 500 MG 24 hr tablet    Other Relevant Orders    Hemoglobin A1c    Hyperlipidemia (Chronic)    Current Assessment & Plan     Start rosuvastatin 10 mg daily. Maintain low fat/cholesterol diet. Recheck labs 3 months.          Relevant Medications    rosuvastatin (CRESTOR) 10 MG tablet    Other Relevant Orders    Hepatic Function Panel    Lipid  Panel With / Chol / HDL Ratio    Abnormal prostate on physical examination    Current Assessment & Plan     Right prostate firmness on exam. Referral to urology for examination.   Lab Results   Component Value Date    PSA 1.500 10/02/2018    PSA 1.040 10/02/2017             Relevant Orders    Ambulatory Referral to Urology       Medium    Cigarette nicotine dependence with nicotine-induced disorder (Chronic)    Current Assessment & Plan     Currently smokes 1 PPD  Duration of smokin years  Prior cessation efforts: no prior attempts  Counseled on smoking cessation for 5 minutes: advised patient to quit  Follow-up in 3 months            Other Visit Diagnoses     Encounter for annual health examination    -  Primary    Relevant Orders    Hepatitis A Vaccine Adult IM    Pneumococcal Polysaccharide Vaccine 23-Valent Greater Than or Equal To 3yo Subcutaneous / IM          Summary/Discussion:           Return in about 3 months (around 1/10/2019) for Next scheduled follow up, Reassess today's problem(s).    No future appointments.      HEALTHCARE MAINTENANCE ISSUES:    Cancer Screening:  · Colon: Initial/Next screening at age: CURRENT  · Repeat colonoscopy every 10 years  · Prostate: Referral made to urologist for abnormal GAYATHRI or PSA  · Testicular: Recommended monthly self exam  · Skin: Monthly self skin examination, annual exam by health professional  · Lung:   · Other:    Screening Labs & Tests:  · Lab results reviewed & discussed with with patient or orders placed today.  · EKG:  · Vascular Screening:   · DEXA (75+ or risk factors):   · HEP C (If born 2324-9004 or risk factors): Previously had negative screen    Immunization/Vaccinations (to be given today unless deferred by patient)  · Influenza: Patient had the flu shot this season  · Hepatitis A: Administer today  · Tetanus/Pertussis: Up to date  · Pneumovax: Administer today  · Prevnar 13: Up to date  · Shingles: Recommended Shingrix at pharmacy  · Other:      Lifestyle Counseling:  · Lifestyle Modifications: Attempt to lose weight, Improve dietary compliance, Maintain a low sugar/carbohydrate diet, Follow a low fat, low cholesterol diet, Decrease or avoid alcohol intake and Quit smoking  · Safety Issues: Always wear seatbelt, Avoid texting while driving   · Use sunscreen, regular skin examination  · Recommended annual dental/vision examination.  · Emotional/Stress/Sleep: Reviewed and  given when appropriate      Health Maintenance   Topic Date Due   • ZOSTER VACCINE (2 of 2) 03/29/2015   • PNEUMOCOCCAL VACCINES (65+ LOW/MEDIUM RISK) (2 of 2 - PPSV23) 07/05/2018   • AAA SCREEN (ONE-TIME)  10/02/2018   • LUNG CANCER SCREENING  02/28/2019   • PROSTATE CANCER SCREENING  10/02/2019   • LIPID PANEL  10/02/2019   • ANNUAL PHYSICAL  10/11/2019   • TDAP/TD VACCINES (2 - Td) 05/29/2023   • COLONOSCOPY  01/22/2028   • HEPATITIS C SCREENING  Completed   • INFLUENZA VACCINE  Completed         **Aidan Disclaimer:   Much of this encounter note is an electronic transcription/translation of spoken language to printed text. The electronic translation of spoken language may permit erroneous, or at times, nonsensical words or phrases to be inadvertently transcribed. Although I have reviewed the note for such errors, some may still exist.

## 2018-10-12 ENCOUNTER — TELEPHONE (OUTPATIENT)
Dept: INTERNAL MEDICINE | Age: 63
End: 2018-10-12

## 2018-10-12 NOTE — TELEPHONE ENCOUNTER
Elevated arm as much as possible.   May apply cool pack to area.   Take Advil as needed for pain along with benadryl for redness/swelling x 2-3 days.   If worsening swelling, redness, fever go to C over the weekend for evaluation.   Should improve over 1-2 days.

## 2018-10-12 NOTE — TELEPHONE ENCOUNTER
Pt recd a pneumonia vaccine on DOS 10/10/18; however the RT arm area where pt recd the vaccine is red, the muscle area is hard(harder than the other arm), the pain from the area moved from outside the arm on Wednesday, 10/10/18 to inside the arm on Thursday, 10/11/18. The area is now just sore but not painful.    Pls advise.    Pt can be reached #[phone].

## 2019-01-03 DIAGNOSIS — R73.03 PREDIABETES: Chronic | ICD-10-CM

## 2019-01-03 DIAGNOSIS — E78.00 PURE HYPERCHOLESTEROLEMIA: Chronic | ICD-10-CM

## 2019-01-03 DIAGNOSIS — Z00.00 PREVENTATIVE HEALTH CARE: ICD-10-CM

## 2019-01-04 LAB
ALBUMIN SERPL-MCNC: 4.5 G/DL (ref 3.5–5.2)
ALP SERPL-CCNC: 69 U/L (ref 39–117)
ALT SERPL-CCNC: 17 U/L (ref 1–41)
AST SERPL-CCNC: 15 U/L (ref 1–40)
BASOPHILS # BLD AUTO: 0.02 10*3/MM3 (ref 0–0.2)
BASOPHILS NFR BLD AUTO: 0.2 % (ref 0–1.5)
BILIRUB DIRECT SERPL-MCNC: <0.2 MG/DL (ref 0–0.3)
BILIRUB SERPL-MCNC: 0.2 MG/DL (ref 0.1–1.2)
CHOLEST SERPL-MCNC: 122 MG/DL (ref 0–200)
CHOLEST/HDLC SERPL: 3.05 {RATIO}
EOSINOPHIL # BLD AUTO: 0.25 10*3/MM3 (ref 0–0.7)
EOSINOPHIL NFR BLD AUTO: 2.5 % (ref 0.3–6.2)
ERYTHROCYTE [DISTWIDTH] IN BLOOD BY AUTOMATED COUNT: 12.7 % (ref 11.5–14.5)
HBA1C MFR BLD: 6.3 % (ref 4.8–5.6)
HCT VFR BLD AUTO: 44.2 % (ref 40.4–52.2)
HDLC SERPL-MCNC: 40 MG/DL (ref 40–60)
HGB BLD-MCNC: 14.9 G/DL (ref 13.7–17.6)
IMM GRANULOCYTES # BLD AUTO: 0.02 10*3/MM3 (ref 0–0.03)
IMM GRANULOCYTES NFR BLD AUTO: 0.2 % (ref 0–0.5)
LDLC SERPL CALC-MCNC: 60 MG/DL (ref 0–100)
LYMPHOCYTES # BLD AUTO: 2.26 10*3/MM3 (ref 0.9–4.8)
LYMPHOCYTES NFR BLD AUTO: 22.9 % (ref 19.6–45.3)
MCH RBC QN AUTO: 31.6 PG (ref 27–32.7)
MCHC RBC AUTO-ENTMCNC: 33.7 G/DL (ref 32.6–36.4)
MCV RBC AUTO: 93.6 FL (ref 79.8–96.2)
MONOCYTES # BLD AUTO: 0.85 10*3/MM3 (ref 0.2–1.2)
MONOCYTES NFR BLD AUTO: 8.6 % (ref 5–12)
NEUTROPHILS # BLD AUTO: 6.47 10*3/MM3 (ref 1.9–8.1)
NEUTROPHILS NFR BLD AUTO: 65.8 % (ref 42.7–76)
PLATELET # BLD AUTO: 216 10*3/MM3 (ref 140–500)
PROT SERPL-MCNC: 6.6 G/DL (ref 6–8.5)
RBC # BLD AUTO: 4.72 10*6/MM3 (ref 4.6–6)
TRIGL SERPL-MCNC: 108 MG/DL (ref 0–150)
VLDLC SERPL CALC-MCNC: 21.6 MG/DL (ref 5–40)
WBC # BLD AUTO: 9.85 10*3/MM3 (ref 4.5–10.7)

## 2019-01-11 ENCOUNTER — OFFICE VISIT (OUTPATIENT)
Dept: INTERNAL MEDICINE | Age: 64
End: 2019-01-11

## 2019-01-11 VITALS
HEART RATE: 84 BPM | TEMPERATURE: 98.2 F | SYSTOLIC BLOOD PRESSURE: 136 MMHG | WEIGHT: 216 LBS | BODY MASS INDEX: 30.14 KG/M2 | OXYGEN SATURATION: 98 % | DIASTOLIC BLOOD PRESSURE: 76 MMHG

## 2019-01-11 DIAGNOSIS — E78.00 PURE HYPERCHOLESTEROLEMIA: Chronic | ICD-10-CM

## 2019-01-11 DIAGNOSIS — I25.10 CORONARY ARTERY DISEASE INVOLVING NATIVE CORONARY ARTERY OF NATIVE HEART WITHOUT ANGINA PECTORIS: Chronic | ICD-10-CM

## 2019-01-11 DIAGNOSIS — R73.03 PREDIABETES: Primary | Chronic | ICD-10-CM

## 2019-01-11 PROCEDURE — 99214 OFFICE O/P EST MOD 30 MIN: CPT | Performed by: INTERNAL MEDICINE

## 2019-01-11 NOTE — PROGRESS NOTES
[facility] INTERNAL MEDICINE  SYMONE BRICEÑO M.D.      Mark L Sturgeon / 63 y.o. / male  01/11/2019      ASSESSMENT & PLAN:    Problem List Items Addressed This Visit        High    Prediabetes - Primary (Chronic)    Current Assessment & Plan     Resume metformin  mg BID (he had stopped medication himself in December)         Relevant Medications    metFORMIN ER (GLUCOPHAGE-XR) 500 MG 24 hr tablet       Medium    Hyperlipidemia (Chronic)    Overview     Continue rosuvastatin 10 mg daily.          Current Assessment & Plan     Improved with rosuvastatin 10 mg.          Relevant Medications    rosuvastatin (CRESTOR) 10 MG tablet       Low    Coronary artery disease involving native coronary artery of native heart without angina pectoris (Chronic)    Overview     Noted on heart cath in 2014    Continue ASA 81 mg qd, statin therapy.              No orders of the defined types were placed in this encounter.    No orders of the defined types were placed in this encounter.      Summary/Discussion:  ·     Return in about 4 months (around 5/11/2019) for Reassess chronic medical problems (POCT A1c level on follow-up).    ____________________________________________________________________    MEDICATIONS  Current Outpatient Medications on File Prior to Visit   Medication Sig   • albuterol (PROVENTIL HFA;VENTOLIN HFA) 108 (90 Base) MCG/ACT inhaler Inhale 2 puffs Every 4 (Four) Hours As Needed for Wheezing.   • aspirin 81 MG EC tablet Take 1 tablet by mouth Daily.   • Cholecalciferol (VITAMIN D PO) Take 1 tablet by mouth Daily.   • Cyanocobalamin (VITAMIN B-12 PO) Take 1 tablet by mouth Daily.   • lansoprazole (PREVACID) 30 MG capsule Take 1 capsule by mouth Daily.   • metFORMIN ER (GLUCOPHAGE-XR) 500 MG 24 hr tablet Take 1 tablet twice daily with meals.   • Omega-3 Fatty Acids (FISH OIL) 1000 MG capsule capsule Take  by mouth.   • rosuvastatin (CRESTOR) 10 MG tablet Take 1 tablet by mouth Daily.   • umeclidinium-vilanterol (ANORO  ELLIPTA) 62.5-25 MCG/INH aerosol powder  inhaler Inhale 1 puff Daily.   • vitamin C (ASCORBIC ACID) 500 MG tablet Take 500 mg by mouth Daily.   • Multiple Vitamins-Minerals (MULTIVITAMIN PO) Take  by mouth.     No current facility-administered medications on file prior to visit.           VITALS:    Visit Vitals  /76   Pulse 84   Temp 98.2 °F (36.8 °C)   Wt 98 kg (216 lb)   SpO2 98%   BMI 30.14 kg/m²       BP Readings from Last 3 Encounters:   01/11/19 136/76   10/10/18 138/90   04/10/18 136/84     Wt Readings from Last 3 Encounters:   01/11/19 98 kg (216 lb)   10/10/18 95.3 kg (210 lb)   04/10/18 99.3 kg (219 lb)      Body mass index is 30.14 kg/m².    CC:  Main reason(s) for today's visit: Prediabetes      HPI:     Prediabetes:  Stopped metformin in December on own due to blood in urine. He followed up with urologist and treated for possible prostatitis. Prediabetes: Weight has not changed significantly, has not been compliant with low carb diet and has not improved level of activity/exercise. Most recent A1c level(s):   Lab Results   Component Value Date    HGBA1C 6.30 (H) 01/04/2019    HGBA1C 6.30 (H) 10/02/2018    HGBA1C 5.92 (H) 10/02/2017      Chronic hyperlipidemia:  Current therapy include rosuvastatin, which was started previously, denies problems with medication.    Most recent labs:   Lab Results   Component Value Date    LDL 60 01/04/2019     (H) 10/02/2018     (H) 10/02/2017    HDL 40 01/04/2019    HDL 49 10/02/2018    HDL 48 10/02/2017    TRIG 108 01/04/2019    CHOLHDLRATIO 3.05 01/04/2019    CHOLHDLRATIO 3.69 10/02/2018    CHOLHDLRATIO 4.19 10/02/2017     CAD: denies chest pain. On ASA and started statin therapy.     Patient Care Team:  Edmundo Tyler MD as PCP - General (Internal Medicine)  Baylee Galaviz MD as Consulting Physician (Pulmonary Disease)    ____________________________________________________________________    REVIEW OF SYSTEMS    Review of Systems  Constitutional  neg  Resp neg for worsening sob  CV neg  Neg for myalgia    PHYSICAL EXAMINATION    Physical Exam  Constitutional  No distress  Cardiovascular Rate  normal . Rhythm: regular . Heart sounds:  Normal  Pulmonary/Chest  Effort normal. Breath sounds:  Bilateral exp wheezes  Psychiatric  Alert. Judgment and thought content normal. Mood normal    REVIEWED DATA:    Labs:     Lab Results   Component Value Date     10/02/2018    K 4.6 10/02/2018    AST 15 01/04/2019    ALT 17 01/04/2019    BUN 13 10/02/2018    CREATININE 0.79 10/02/2018    CREATININE 0.90 10/02/2017    CREATININE 0.80 08/29/2014    EGFRIFNONA 98 10/02/2018    EGFRIFAFRI 119 10/02/2018       Lab Results   Component Value Date    HGBA1C 6.30 (H) 01/04/2019    HGBA1C 6.30 (H) 10/02/2018    HGBA1C 5.92 (H) 10/02/2017    GLUCOSE 142 (H) 08/29/2014       Lab Results   Component Value Date    LDL 60 01/04/2019     (H) 10/02/2018     (H) 10/02/2017    HDL 40 01/04/2019    HDL 49 10/02/2018    HDL 48 10/02/2017    TRIG 108 01/04/2019    TRIG 155 (H) 10/02/2018    TRIG 142 10/02/2017    CHOLHDLRATIO 3.05 01/04/2019    CHOLHDLRATIO 3.69 10/02/2018    CHOLHDLRATIO 4.19 10/02/2017       Lab Results   Component Value Date    TSH 2.870 10/02/2018    FREET4 1.20 10/02/2018       Lab Results   Component Value Date    WBC 9.85 01/04/2019    HGB 14.9 01/04/2019    HGB 15.5 10/02/2018    HGB 16.0 10/02/2017     01/04/2019       Lab Results   Component Value Date    PROTEIN Negative 10/02/2018    GLUCOSEU Negative 10/02/2018    BLOODU Negative 10/02/2018    NITRITEU Negative 10/02/2018    LEUKOCYTESUR Negative 10/02/2018       Imaging:         Medical Tests:         Summary of old records / correspondence / consultant report:         Request outside records:         ALLERGIES  Allergies   Allergen Reactions   • Codeine Nausea Only        PFSH:     The following portions of the patient's history were reviewed and updated as appropriate: Allergies /  Current Medications / Past Medical History / Surgical History / Social History / Family History    PROBLEM LIST   Patient Active Problem List   Diagnosis   • Primary osteoarthritis of hand   • COPD (chronic obstructive pulmonary disease) (CMS/[facility])   • Cigarette nicotine dependence with nicotine-induced disorder   • Prediabetes   • Hyperlipidemia   • Gastroesophageal reflux disease with esophagitis   • Coronary artery disease involving native coronary artery of native heart without angina pectoris       PAST MEDICAL HISTORY  Past Medical History:   Diagnosis Date   • Asthma    • COPD (chronic obstructive pulmonary disease) (CMS/[facility])    • Pulmonary nodule        SURGICAL HISTORY  Past Surgical History:   Procedure Laterality Date   • COLONOSCOPY N/A 1/22/2018    Diverticulosis in the sigmoid colon, in the descending colon, in the transverse colon and in the ascending colon, non-bleeding internal hemorrhoids   • ENDOSCOPY N/A 1/22/2018    LA Grade C reflux esophagitis, small hiatal hernia, erythematous mucosa in the stomach, duodenal erosions without bleeding   • HAND SURGERY     • THROAT SURGERY  2012    polyp removal   • TONSILLECTOMY     • VASECTOMY         SOCIAL HISTORY  Social History     Socioeconomic History   • Marital status:      Spouse name: Zeenat*   • Number of children: 2   • Years of education: Not on file   • Highest education level: Not on file   Occupational History   • Occupation:  ([employer])   Tobacco Use   • Smoking status: Current Every Day Smoker     Packs/day: 1.00     Years: 45.00     Pack years: 45.00     Types: Cigarettes   • Smokeless tobacco: Never Used   Substance and Sexual Activity   • Alcohol use: Yes     Alcohol/week: 7.2 oz     Types: 12 Cans of beer per week   • Drug use: No   • Sexual activity: Yes     Partners: Female       FAMILY HISTORY  Family History   Problem Relation Age of Onset   • Esophageal cancer Mother 68        smoker   • Asthma Mother    •  Heart attack Father 50        smoker   • Asthma Sister         smoker   • Lung disease Brother    • Asthma Maternal Grandmother    • Diabetes Maternal Grandmother    • Heart attack Maternal Grandfather 75   • Colon cancer Neg Hx    • Prostate cancer Neg Hx          **Aidan Disclaimer:   Much of this encounter note is an electronic transcription/translation of spoken language to printed text. The electronic translation of spoken language may permit erroneous, or at times, nonsensical words or phrases to be inadvertently transcribed. Although I have reviewed the note for such errors, some may still exist.

## 2019-01-23 ENCOUNTER — TELEPHONE (OUTPATIENT)
Dept: INTERNAL MEDICINE | Age: 64
End: 2019-01-23

## 2019-01-23 NOTE — TELEPHONE ENCOUNTER
Pt was seen on DOS 1/11/19 by  & it was mentioned about the pt having CAD involving native coronary artery.    The pt & spouse has concerns in re: to this dx because it has never been mentioned in previous appts with .    Nighat is requesting a call back because she has questions.    Pls advise.    Nighat can be reached #[phone].

## 2019-04-05 DIAGNOSIS — R73.03 PREDIABETES: Chronic | ICD-10-CM

## 2019-04-05 DIAGNOSIS — E78.00 PURE HYPERCHOLESTEROLEMIA: Chronic | ICD-10-CM

## 2019-04-05 RX ORDER — METFORMIN HYDROCHLORIDE 500 MG/1
TABLET, EXTENDED RELEASE ORAL
Qty: 60 TABLET | Refills: 0 | Status: SHIPPED | OUTPATIENT
Start: 2019-04-05 | End: 2019-04-08 | Stop reason: SDUPTHER

## 2019-04-05 RX ORDER — ROSUVASTATIN CALCIUM 10 MG/1
10 TABLET, COATED ORAL DAILY
Qty: 30 TABLET | Refills: 5 | Status: SHIPPED | OUTPATIENT
Start: 2019-04-05 | End: 2019-09-16 | Stop reason: SDUPTHER

## 2019-04-08 ENCOUNTER — TELEPHONE (OUTPATIENT)
Dept: INTERNAL MEDICINE | Age: 64
End: 2019-04-08

## 2019-04-08 DIAGNOSIS — R73.03 PREDIABETES: Chronic | ICD-10-CM

## 2019-04-08 RX ORDER — METFORMIN HYDROCHLORIDE 500 MG/1
TABLET, EXTENDED RELEASE ORAL
Qty: 180 TABLET | Refills: 2 | Status: SHIPPED | OUTPATIENT
Start: 2019-04-08 | End: 2019-10-02

## 2019-04-08 NOTE — TELEPHONE ENCOUNTER
[facility] called they said his insurance will only cover 90 day metformin and we only sent over 60 can they fill it for 90.  Please call pharmacy at [phone]

## 2019-05-13 ENCOUNTER — OFFICE VISIT (OUTPATIENT)
Dept: INTERNAL MEDICINE | Age: 64
End: 2019-05-13

## 2019-05-13 VITALS
TEMPERATURE: 98.4 F | SYSTOLIC BLOOD PRESSURE: 138 MMHG | HEIGHT: 71 IN | HEART RATE: 68 BPM | OXYGEN SATURATION: 95 % | WEIGHT: 207 LBS | DIASTOLIC BLOOD PRESSURE: 74 MMHG | BODY MASS INDEX: 28.98 KG/M2

## 2019-05-13 DIAGNOSIS — R73.03 PREDIABETES: Primary | ICD-10-CM

## 2019-05-13 DIAGNOSIS — I10 ESSENTIAL HYPERTENSION: ICD-10-CM

## 2019-05-13 DIAGNOSIS — I25.10 CORONARY ARTERY DISEASE INVOLVING NATIVE CORONARY ARTERY OF NATIVE HEART WITHOUT ANGINA PECTORIS: Chronic | ICD-10-CM

## 2019-05-13 DIAGNOSIS — J44.9 CHRONIC OBSTRUCTIVE PULMONARY DISEASE, UNSPECIFIED COPD TYPE (HCC): Chronic | ICD-10-CM

## 2019-05-13 DIAGNOSIS — Z87.891 HISTORY OF SMOKING 25-50 PACK YEARS: ICD-10-CM

## 2019-05-13 DIAGNOSIS — F17.219 CIGARETTE NICOTINE DEPENDENCE WITH NICOTINE-INDUCED DISORDER: Chronic | ICD-10-CM

## 2019-05-13 DIAGNOSIS — Z12.2 ENCOUNTER FOR SCREENING FOR MALIGNANT NEOPLASM OF RESPIRATORY ORGANS: ICD-10-CM

## 2019-05-13 DIAGNOSIS — E78.5 HYPERLIPIDEMIA, UNSPECIFIED HYPERLIPIDEMIA TYPE: Chronic | ICD-10-CM

## 2019-05-13 LAB — HBA1C MFR BLD: 6.1 %

## 2019-05-13 PROCEDURE — 99214 OFFICE O/P EST MOD 30 MIN: CPT | Performed by: INTERNAL MEDICINE

## 2019-05-13 PROCEDURE — 83036 HEMOGLOBIN GLYCOSYLATED A1C: CPT | Performed by: INTERNAL MEDICINE

## 2019-05-13 RX ORDER — LOSARTAN POTASSIUM 25 MG/1
25 TABLET ORAL DAILY
Qty: 30 TABLET | Refills: 3 | Status: SHIPPED | OUTPATIENT
Start: 2019-05-13 | End: 2019-09-16 | Stop reason: SDUPTHER

## 2019-05-13 NOTE — ASSESSMENT & PLAN NOTE
Lab Results   Component Value Date    HGBA1C 6.1 05/13/2019    HGBA1C 6.30 (H) 01/04/2019    HGBA1C 6.30 (H) 10/02/2018      Improved weight and A1c with metformin.

## 2019-05-13 NOTE — ASSESSMENT & PLAN NOTE
Advised to resume Anoro. Advised to quit smoking. Pulmonologist ordered nicotine patch for him.

## 2019-05-13 NOTE — PROGRESS NOTES
[facility]  SYMONE BRICEÑO M.D.      Mark L Sturgeon / 63 y.o. / male  05/13/2019    ASSESSMENT & PLAN     Problem List Items Addressed This Visit        High    Prediabetes - Primary (Chronic)    Current Assessment & Plan     Lab Results   Component Value Date    HGBA1C 6.1 05/13/2019    HGBA1C 6.30 (H) 01/04/2019    HGBA1C 6.30 (H) 10/02/2018      Improved weight and A1c with metformin.          Relevant Medications    metFORMIN ER (GLUCOPHAGE-XR) 500 MG 24 hr tablet    Other Relevant Orders    POC Glycosylated Hemoglobin (Hb A1C) (Completed)    Essential hypertension (Chronic)    Current Assessment & Plan     This is a new problem to this examiner.   Start losartan 25 mg qd.          Relevant Medications    losartan (COZAAR) 25 MG tablet       Medium    Hyperlipidemia (Chronic)    Overview     Continue rosuvastatin 10 mg daily.          Relevant Medications    rosuvastatin (CRESTOR) 10 MG tablet       Low    COPD (chronic obstructive pulmonary disease) (CMS/HCC) (Chronic)    Overview     *Guillaume         Current Assessment & Plan     Advised to resume Anoro. Advised to quit smoking. Pulmonologist ordered nicotine patch for him.            Relevant Medications    umeclidinium-vilanterol (ANORO ELLIPTA) 62.5-25 MCG/INH aerosol powder  inhaler    albuterol (PROVENTIL HFA;VENTOLIN HFA) 108 (90 Base) MCG/ACT inhaler    Cigarette nicotine dependence with nicotine-induced disorder (Chronic)    Current Assessment & Plan     Advised to quit smoking.   CT low dose.          Coronary artery disease involving native coronary artery of native heart without angina pectoris (Chronic)    Overview     Noted on heart cath in 2014    Continue ASA 81 mg qd, statin therapy.            Other Visit Diagnoses     Encounter for screening for malignant neoplasm of respiratory organs        Relevant Orders    CT Chest Low Dose Wo    History of smoking 25-50 pack years        Relevant Orders    CT Chest Low Dose Wo        Orders Placed  "This Encounter   Procedures   • CT Chest Low Dose Wo   • POC Glycosylated Hemoglobin (Hb A1C)     New Medications Ordered This Visit   Medications   • losartan (COZAAR) 25 MG tablet     Sig: Take 1 tablet by mouth Daily.     Dispense:  30 tablet     Refill:  3       Summary/Discussion:  ·     Next Appointment with me: Visit date not found    Return in about 4 months (around 9/13/2019) for Reassess chronic medical problems.      MEDICATIONS  Current Outpatient Medications   Medication Sig Dispense Refill   • albuterol (PROVENTIL HFA;VENTOLIN HFA) 108 (90 Base) MCG/ACT inhaler Inhale 2 puffs Every 4 (Four) Hours As Needed for Wheezing. 6.7 g 3   • aspirin 81 MG EC tablet Take 1 tablet by mouth Daily.     • Cholecalciferol (VITAMIN D PO) Take 1 tablet by mouth Daily.     • Cyanocobalamin (VITAMIN B-12 PO) Take 1 tablet by mouth Daily.     • metFORMIN ER (GLUCOPHAGE-XR) 500 MG 24 hr tablet TAKE 1 TABLET BY MOUTH TWICE DAILY WITH MEALS 180 tablet 2   • Multiple Vitamins-Minerals (MULTIVITAMIN PO) Take  by mouth.     • Omega-3 Fatty Acids (FISH OIL) 1000 MG capsule capsule Take  by mouth.     • rosuvastatin (CRESTOR) 10 MG tablet TAKE 1 TABLET BY MOUTH DAILY 30 tablet 5   • umeclidinium-vilanterol (ANORO ELLIPTA) 62.5-25 MCG/INH aerosol powder  inhaler Inhale 1 puff Daily.     • vitamin C (ASCORBIC ACID) 500 MG tablet Take 500 mg by mouth Daily.       No current facility-administered medications for this visit.           VITALS:    Visit Vitals  /74   Pulse 68   Temp 98.4 °F (36.9 °C)   Ht 180.3 cm (70.98\")   Wt 93.9 kg (207 lb)   SpO2 95%   BMI 28.88 kg/m²       BP Readings from Last 3 Encounters:   05/13/19 138/74   01/11/19 136/76   10/10/18 138/90     Wt Readings from Last 3 Encounters:   05/13/19 93.9 kg (207 lb)   01/11/19 98 kg (216 lb)   10/10/18 95.3 kg (210 lb)      Body mass index is 28.88 kg/m².        CC:  Main reason(s) for today's visit: Prediabetes and Hyperlipidemia      HPI:     Prediabetes:  " Prediabetes: Previously A1c level was increasing, weight loss noted and on metformin. Most recent A1c level(s):   Lab Results   Component Value Date    HGBA1C 6.1 05/13/2019    HGBA1C 6.30 (H) 01/04/2019    HGBA1C 6.30 (H) 10/02/2018     BP is again in the upper 130 SBP.     Chronic hyperlipidemia:  Current therapy include rosuvastatin, denies problems with medication.    Most recent labs:   Lab Results   Component Value Date    LDL 60 01/04/2019     (H) 10/02/2018     (H) 10/02/2017    HDL 40 01/04/2019    HDL 49 10/02/2018    HDL 48 10/02/2017    TRIG 108 01/04/2019    CHOLHDLRATIO 3.05 01/04/2019    CHOLHDLRATIO 3.69 10/02/2018    CHOLHDLRATIO 4.19 10/02/2017      CAD is stable without angina.     COPD: still smoking, not using Anoro, coughs daily and gets out of breath.       Patient Care Team:  Edmundo Tyler MD as PCP - General (Internal Medicine)  Baylee Galaviz MD as Consulting Physician (Pulmonary Disease)      REVIEW OF SYSTEMS     Review of Systems  Constitutional neg  Resp cough, shortness of breath   CV neg for chest pain   Neuro neg  Psych neg       PHYSICAL EXAMINATION     Physical Exam   Pulmonary/Chest: Effort normal. No stridor. No tachypnea. He has decreased breath sounds. He has wheezes. He has no rhonchi. He has no rales.     Constitutional  No distress, overweight   Cardiovascular Rate  normal . Rhythm: regular . Heart sounds:  Normal  Psychiatric  Alert. Judgment and thought content normal. Mood normal      REVIEWED DATA     Labs:     Lab Results   Component Value Date     10/02/2018    K 4.6 10/02/2018    CALCIUM 9.6 10/02/2018    AST 15 01/04/2019    ALT 17 01/04/2019    BUN 13 10/02/2018    CREATININE 0.79 10/02/2018    CREATININE 0.90 10/02/2017    CREATININE 0.80 08/29/2014    EGFRIFNONA 98 10/02/2018    EGFRIFAFRI 119 10/02/2018       Lab Results   Component Value Date    HGBA1C 6.1 05/13/2019    HGBA1C 6.30 (H) 01/04/2019    HGBA1C 6.30 (H) 10/02/2018    GLUCOSE 142  (H) 08/29/2014       Lab Results   Component Value Date    LDL 60 01/04/2019     (H) 10/02/2018     (H) 10/02/2017    HDL 40 01/04/2019    HDL 49 10/02/2018    HDL 48 10/02/2017    TRIG 108 01/04/2019    TRIG 155 (H) 10/02/2018    TRIG 142 10/02/2017    CHOLHDLRATIO 3.05 01/04/2019    CHOLHDLRATIO 3.69 10/02/2018    CHOLHDLRATIO 4.19 10/02/2017       Lab Results   Component Value Date    TSH 2.870 10/02/2018    FREET4 1.20 10/02/2018       Lab Results   Component Value Date    WBC 9.85 01/04/2019    HGB 14.9 01/04/2019    HGB 15.5 10/02/2018    HGB 16.0 10/02/2017     01/04/2019       Lab Results   Component Value Date    PROTEIN Negative 10/02/2018    GLUCOSEU Negative 10/02/2018    BLOODU Negative 10/02/2018    NITRITEU Negative 10/02/2018    LEUKOCYTESUR Negative 10/02/2018       Imaging:         Medical Tests:         Summary of old records / correspondence / consultant report:         Request outside records:         ALLERGIES  Allergies   Allergen Reactions   • Codeine Nausea Only        PFSH:     The following portions of the patient's history were reviewed and updated as appropriate: Allergies / Current Medications / Past Medical History / Surgical History / Social History / Family History    PROBLEM LIST   Patient Active Problem List   Diagnosis   • Primary osteoarthritis of hand   • COPD (chronic obstructive pulmonary disease) (CMS/[facility])   • Cigarette nicotine dependence with nicotine-induced disorder   • Prediabetes   • Hyperlipidemia   • Gastroesophageal reflux disease with esophagitis   • Coronary artery disease involving native coronary artery of native heart without angina pectoris   • Essential hypertension       PAST MEDICAL HISTORY  Past Medical History:   Diagnosis Date   • Asthma    • COPD (chronic obstructive pulmonary disease) (CMS/[facility])    • Pulmonary nodule        SURGICAL HISTORY  Past Surgical History:   Procedure Laterality Date   • COLONOSCOPY N/A 1/22/2018     Diverticulosis in the sigmoid colon, in the descending colon, in the transverse colon and in the ascending colon, non-bleeding internal hemorrhoids   • ENDOSCOPY N/A 1/22/2018    LA Grade C reflux esophagitis, small hiatal hernia, erythematous mucosa in the stomach, duodenal erosions without bleeding   • HAND SURGERY     • THROAT SURGERY  2012    polyp removal   • TONSILLECTOMY     • VASECTOMY         SOCIAL HISTORY  Social History     Socioeconomic History   • Marital status:      Spouse name: Zeenat*   • Number of children: 2   • Years of education: Not on file   • Highest education level: Not on file   Occupational History   • Occupation:  ([employer])   Tobacco Use   • Smoking status: Current Every Day Smoker     Packs/day: 1.00     Years: 45.00     Pack years: 45.00     Types: Cigarettes   • Smokeless tobacco: Never Used   Substance and Sexual Activity   • Alcohol use: Yes     Alcohol/week: 7.2 oz     Types: 12 Cans of beer per week   • Drug use: No   • Sexual activity: Yes     Partners: Female       FAMILY HISTORY  Family History   Problem Relation Age of Onset   • Esophageal cancer Mother 68        smoker   • Asthma Mother    • Heart attack Father 50        smoker   • Asthma Sister         smoker   • Lung disease Brother    • Asthma Maternal Grandmother    • Diabetes Maternal Grandmother    • Heart attack Maternal Grandfather 75   • Colon cancer Neg Hx    • Prostate cancer Neg Hx          **Dragon Disclaimer:   Much of this encounter note is an electronic transcription/translation of spoken language to printed text. The electronic translation of spoken language may permit erroneous, or at times, nonsensical words or phrases to be inadvertently transcribed. Although I have reviewed the note for such errors, some may still exist.       Template created by Mandy Tyler MD

## 2019-06-03 ENCOUNTER — HOSPITAL ENCOUNTER (OUTPATIENT)
Dept: CT IMAGING | Facility: HOSPITAL | Age: 64
Discharge: HOME | End: 2019-06-03
Admitting: INTERNAL MEDICINE

## 2019-06-03 PROCEDURE — G0297 LDCT FOR LUNG CA SCREEN: HCPCS

## 2019-06-06 PROBLEM — R91.8 ABNORMAL CT LUNG SCREENING: Status: ACTIVE | Noted: 2019-06-06

## 2019-06-07 ENCOUNTER — TELEPHONE (OUTPATIENT)
Dept: INTERNAL MEDICINE | Age: 64
End: 2019-06-07

## 2019-06-07 NOTE — TELEPHONE ENCOUNTER
----- Message from Edmundo Tyler MD sent at 6/6/2019 10:16 PM EDT -----  Call patient with his test result(s) and mail the results to him if MyChart is NOT active.    CT of chest shows a small cavitary lesion of right upper lobe of the lung. Radiologist favors this to be a small bronchiectais and less likely a cavitary lung nodule. I would recommend you discuss this finding with Dr. Galaviz your lung doctor. I would suggest repeating a CT in 3-4 months. This can be arrange by either me or your lung doctor. Will discuss in further detail on your next visit in September with me.

## 2019-09-16 ENCOUNTER — OFFICE VISIT (OUTPATIENT)
Dept: INTERNAL MEDICINE | Age: 64
End: 2019-09-16

## 2019-09-16 VITALS
OXYGEN SATURATION: 94 % | HEART RATE: 76 BPM | SYSTOLIC BLOOD PRESSURE: 132 MMHG | WEIGHT: 203.2 LBS | TEMPERATURE: 98 F | HEIGHT: 71 IN | DIASTOLIC BLOOD PRESSURE: 82 MMHG | BODY MASS INDEX: 28.45 KG/M2

## 2019-09-16 DIAGNOSIS — E78.00 PURE HYPERCHOLESTEROLEMIA: Chronic | ICD-10-CM

## 2019-09-16 DIAGNOSIS — F17.219 CIGARETTE NICOTINE DEPENDENCE WITH NICOTINE-INDUCED DISORDER: Chronic | ICD-10-CM

## 2019-09-16 DIAGNOSIS — R91.8 ABNORMAL CT LUNG SCREENING: Primary | ICD-10-CM

## 2019-09-16 DIAGNOSIS — I10 ESSENTIAL HYPERTENSION: Chronic | ICD-10-CM

## 2019-09-16 DIAGNOSIS — J44.9 CHRONIC OBSTRUCTIVE PULMONARY DISEASE, UNSPECIFIED COPD TYPE (HCC): Chronic | ICD-10-CM

## 2019-09-16 DIAGNOSIS — E78.5 HYPERLIPIDEMIA, UNSPECIFIED HYPERLIPIDEMIA TYPE: Chronic | ICD-10-CM

## 2019-09-16 DIAGNOSIS — R73.03 PREDIABETES: Chronic | ICD-10-CM

## 2019-09-16 LAB
ALBUMIN SERPL-MCNC: 4.6 G/DL (ref 3.5–5.2)
ALBUMIN/GLOB SERPL: 1.8 G/DL
ALP SERPL-CCNC: 60 U/L (ref 39–117)
ALT SERPL-CCNC: 12 U/L (ref 1–41)
AST SERPL-CCNC: 9 U/L (ref 1–40)
BILIRUB SERPL-MCNC: 0.2 MG/DL (ref 0.2–1.2)
BUN SERPL-MCNC: 11 MG/DL (ref 8–23)
BUN/CREAT SERPL: 15.5 (ref 7–25)
CALCIUM SERPL-MCNC: 9.5 MG/DL (ref 8.6–10.5)
CHLORIDE SERPL-SCNC: 99 MMOL/L (ref 98–107)
CO2 SERPL-SCNC: 28.1 MMOL/L (ref 22–29)
CREAT SERPL-MCNC: 0.71 MG/DL (ref 0.76–1.27)
GLOBULIN SER CALC-MCNC: 2.6 GM/DL
GLUCOSE SERPL-MCNC: 73 MG/DL (ref 65–99)
HBA1C MFR BLD: 6.5 % (ref 4.8–5.6)
POTASSIUM SERPL-SCNC: 4.3 MMOL/L (ref 3.5–5.2)
PROT SERPL-MCNC: 7.2 G/DL (ref 6–8.5)
SODIUM SERPL-SCNC: 140 MMOL/L (ref 136–145)

## 2019-09-16 PROCEDURE — 99406 BEHAV CHNG SMOKING 3-10 MIN: CPT | Performed by: INTERNAL MEDICINE

## 2019-09-16 PROCEDURE — 99214 OFFICE O/P EST MOD 30 MIN: CPT | Performed by: INTERNAL MEDICINE

## 2019-09-16 RX ORDER — ROSUVASTATIN CALCIUM 10 MG/1
10 TABLET, COATED ORAL NIGHTLY
Qty: 90 TABLET | Refills: 2 | Status: SHIPPED | OUTPATIENT
Start: 2019-09-16 | End: 2020-06-12 | Stop reason: SDUPTHER

## 2019-09-16 RX ORDER — LOSARTAN POTASSIUM 25 MG/1
25 TABLET ORAL DAILY
Qty: 90 TABLET | Refills: 2 | Status: SHIPPED | OUTPATIENT
Start: 2019-09-16 | End: 2020-06-12 | Stop reason: SDUPTHER

## 2019-09-16 NOTE — ASSESSMENT & PLAN NOTE
Time spent counseling: 3 minutes  Currently smokes 1/2 PPD  Duration of smokin years  Prior cessation efforts: nicotine replacement therapy  Counseled on smoking cessation for 5 minutes: advised patient to quit, discussed nicotine replacement therapy, discussed bupropion and discussed Chantix  Follow-up in 6 months

## 2019-09-16 NOTE — PATIENT INSTRUCTIONS
** IMPORTANT MESSAGE FROM DR. BRICEÑO **    In our office, your satisfaction is VERY important to us.     You may receive a survey from [employer] by mail or E-mail for you to provide feedback about your visit. This information is invaluable for me to know what we can do to improve our services.     I ask that you please take a few minutes to complete the survey and let us know how we are doing in serving your needs. (You may receive the survey more than once for multiple visits)    Thank You !    Dr. Briceño & Staff    __________________________________________________________      ADDITIONAL INSTRUCTION / REMINDERS FROM DR. BRICEÑO

## 2019-09-16 NOTE — PROGRESS NOTES
[facility] [place]  SYMONE BRICEÑO M.D.      Mark L Sturgeon / 64 y.o. / male  09/16/2019      CHIEF COMPLAINT     Prediabetes (4 mo f/u); Hypertension; and Hyperlipidemia      ASSESSMENT & PLAN     Problem List Items Addressed This Visit        High    Essential hypertension (Chronic)    Current Assessment & Plan     Stable. Continue losartan 25 mg qd. Refill for 90 days.          Relevant Medications    losartan (COZAAR) 25 MG tablet    Abnormal CT lung screening - Primary    Overview     Small cavitary lesion of RUL noted on screening CT (6/2019).   Suggested follow-up with pulmonologist regarding this.   Suggested repeating CT in 3-4 month for closer follow-up.          Current Assessment & Plan     He has follow-up with pulmonologist next month. He was instructed to discuss abnormal CT finding with him. He expressed understanding and agreed to follow above instructions.             Medium    Prediabetes (Chronic)    Current Assessment & Plan     Lab Results   Component Value Date    HGBA1C 6.1 05/13/2019    HGBA1C 6.30 (H) 01/04/2019    HGBA1C 6.30 (H) 10/02/2018     Lab Results   Component Value Date     (H) 10/02/2018     (H) 10/02/2017     Lab Results   Component Value Date     (H) 10/02/2018     (H) 10/02/2017     Lab Results   Component Value Date    HGBA1C 6.1 05/13/2019    HGBA1C 6.30 (H) 01/04/2019    HGBA1C 6.30 (H) 10/02/2018     BMI Readings from Last 3 Encounters:   09/16/19 28.36 kg/m²   05/13/19 28.88 kg/m²   01/11/19 30.14 kg/m²      Wt Readings from Last 3 Encounters:   09/16/19 92.2 kg (203 lb 3.2 oz)   05/13/19 93.9 kg (207 lb)   01/11/19 98 kg (216 lb)      Check A1c. Maintain a low sugar/starch/carbohydrate diet and exercise regularly. Wt loss advised. Continue metformin  mg BID.          Relevant Medications    metFORMIN ER (GLUCOPHAGE-XR) 500 MG 24 hr tablet    Other Relevant Orders    Hemoglobin A1c (Completed)    Hyperlipidemia (Chronic)    Overview      Continue rosuvastatin 10 mg daily.          Relevant Medications    rosuvastatin (CRESTOR) 10 MG tablet    Other Relevant Orders    Comprehensive Metabolic Panel (Completed)       Low    COPD (chronic obstructive pulmonary disease) (CMS/[facility]) (Chronic)    Overview     *Guillaume         Current Assessment & Plan     Advised to start using Anoro inhaler daily.   Advised to quit smoking.            Relevant Medications    umeclidinium-vilanterol (ANORO ELLIPTA) 62.5-25 MCG/INH aerosol powder  inhaler    albuterol (PROVENTIL HFA;VENTOLIN HFA) 108 (90 Base) MCG/ACT inhaler    Cigarette nicotine dependence with nicotine-induced disorder (Chronic)    Current Assessment & Plan     Time spent counseling: 3 minutes  Currently smokes 1/2 PPD  Duration of smokin years  Prior cessation efforts: nicotine replacement therapy  Counseled on smoking cessation for 5 minutes: advised patient to quit, discussed nicotine replacement therapy, discussed bupropion and discussed Chantix  Follow-up in 6 months              Orders Placed This Encounter   Procedures   • Hemoglobin A1c   • Comprehensive Metabolic Panel     New Medications Ordered This Visit   Medications   • losartan (COZAAR) 25 MG tablet     Sig: Take 1 tablet by mouth Daily.     Dispense:  90 tablet     Refill:  2   • rosuvastatin (CRESTOR) 10 MG tablet     Sig: Take 1 tablet by mouth Every Night.     Dispense:  90 tablet     Refill:  2       Summary/Discussion:  ·       Next Appointment with me: Visit date not found    Return in about 6 months (around 3/16/2020) for Hypertension & hyperlipidemia, COPD.      MEDICATIONS     Current Outpatient Medications   Medication Sig Dispense Refill   • albuterol (PROVENTIL HFA;VENTOLIN HFA) 108 (90 Base) MCG/ACT inhaler Inhale 2 puffs Every 4 (Four) Hours As Needed for Wheezing. 6.7 g 3   • aspirin 81 MG EC tablet Take 1 tablet by mouth Daily.     • Cholecalciferol (VITAMIN D PO) Take 1 tablet by mouth Daily.     • Cyanocobalamin (VITAMIN  "B-12 PO) Take 1 tablet by mouth Daily.     • losartan (COZAAR) 25 MG tablet Take 1 tablet by mouth Daily. 90 tablet 2   • metFORMIN ER (GLUCOPHAGE-XR) 500 MG 24 hr tablet TAKE 1 TABLET BY MOUTH TWICE DAILY WITH MEALS 180 tablet 2   • Multiple Vitamins-Minerals (MULTIVITAMIN PO) Take  by mouth.     • Omega-3 Fatty Acids (FISH OIL) 1000 MG capsule capsule Take  by mouth.     • rosuvastatin (CRESTOR) 10 MG tablet Take 1 tablet by mouth Every Night. 90 tablet 2   • umeclidinium-vilanterol (ANORO ELLIPTA) 62.5-25 MCG/INH aerosol powder  inhaler Inhale 1 puff Daily.     • vitamin C (ASCORBIC ACID) 500 MG tablet Take 500 mg by mouth Daily.       No current facility-administered medications for this visit.           VITAL SIGNS     Visit Vitals  /82   Pulse 76   Temp 98 °F (36.7 °C)   Ht 180.3 cm (70.98\")   Wt 92.2 kg (203 lb 3.2 oz)   SpO2 94%   BMI 28.36 kg/m²       BP Readings from Last 3 Encounters:   09/16/19 132/82   05/13/19 138/74   01/11/19 136/76     Wt Readings from Last 3 Encounters:   09/16/19 92.2 kg (203 lb 3.2 oz)   05/13/19 93.9 kg (207 lb)   01/11/19 98 kg (216 lb)      Body mass index is 28.36 kg/m².      HISTORY OF PRESENT ILLNESS     Reviewed chief complaint and details of complaint as documented by staff.     Previous CT showed a small ? Cavitary lesion and he was instructed to discuss with his pulmonologist but has not done so. However, he does have a follow-up next month. Still smokes but has reduced to 1/2 pack per day. Does not use Anoro inhaler daily and complains of shortness of breath. Denies angina.     Hypertension remains controlled on losartan.     Prediabetes:  weight has not changed significantly and on metformin. Most recent A1c level(s):   Lab Results   Component Value Date    HGBA1C 6.50 (H) 09/16/2019    HGBA1C 6.1 05/13/2019    HGBA1C 6.30 (H) 01/04/2019      Chronic hyperlipidemia:  Current therapy include rosuvastatin, denies problems with medication.    Most recent labs: "   Lab Results   Component Value Date    LDL 60 01/04/2019     (H) 10/02/2018     (H) 10/02/2017    HDL 40 01/04/2019    HDL 49 10/02/2018    HDL 48 10/02/2017    TRIG 108 01/04/2019    CHOLHDLRATIO 3.05 01/04/2019    CHOLHDLRATIO 3.69 10/02/2018    CHOLHDLRATIO 4.19 10/02/2017        Patient Care Team:  Edmundo Tyler MD as PCP - General (Internal Medicine)  Baylee Galaviz MD as Consulting Physician (Pulmonary Disease)      REVIEW OF SYSTEMS     Review of Systems  Constitutional neg  Resp per HPI   CV neg  Psych neg for depression       PHYSICAL EXAMINATION     Physical Exam  Constitutional  No distress  Cardiovascular Rate  normal . Rhythm: regular . Heart sounds:  Normal  Pulmonary/Chest: Effort mehreen. Wheezing R>L.   Psychiatric  Alert. Judgment and thought content normal. Mood normal      REVIEWED DATA     Labs:     Lab Results   Component Value Date     09/16/2019    K 4.3 09/16/2019    CALCIUM 9.5 09/16/2019    AST 9 09/16/2019    ALT 12 09/16/2019    BUN 11 09/16/2019    CREATININE 0.71 (L) 09/16/2019    CREATININE 0.79 10/02/2018    CREATININE 0.90 10/02/2017    EGFRIFNONA 111 09/16/2019    EGFRIFAFRI 135 09/16/2019       Lab Results   Component Value Date    HGBA1C 6.50 (H) 09/16/2019    HGBA1C 6.1 05/13/2019    HGBA1C 6.30 (H) 01/04/2019    GLU 73 09/16/2019     (H) 10/02/2018     (H) 10/02/2017       Lab Results   Component Value Date    LDL 60 01/04/2019     (H) 10/02/2018     (H) 10/02/2017    HDL 40 01/04/2019    HDL 49 10/02/2018    HDL 48 10/02/2017    TRIG 108 01/04/2019    TRIG 155 (H) 10/02/2018    TRIG 142 10/02/2017    CHOLHDLRATIO 3.05 01/04/2019    CHOLHDLRATIO 3.69 10/02/2018    CHOLHDLRATIO 4.19 10/02/2017       Lab Results   Component Value Date    TSH 2.870 10/02/2018    FREET4 1.20 10/02/2018       Lab Results   Component Value Date    WBC 9.85 01/04/2019    HGB 14.9 01/04/2019    HGB 15.5 10/02/2018    HGB 16.0 10/02/2017      01/04/2019       Lab Results   Component Value Date    PROTEIN Negative 10/02/2018    GLUCOSEU Negative 10/02/2018    BLOODU Negative 10/02/2018    NITRITEU Negative 10/02/2018    LEUKOCYTESUR Negative 10/02/2018       Imaging:     LOW-DOSE CHEST CT WITHOUT IV CONTRAST     HISTORY: Lung cancer screening.     TECHNIQUE: Radiation dose reduction techniques were utilized, including  automated exposure control and exposure modulation based on body size.  Low-dose chest CT protocol with 2 mm intervals. No priors available for  comparison.     FINDINGS: The central airways are patent. Dependent secretions are seen  within the jenny and the right main bronchus. There is diffuse  bronchial wall thickening with peribronchial interstitial thickening  most suggestive of bronchitis. Small thick-walled cavitary lesion seen  within the right upper lobe, image 68 may represent focal bronchiectasis  and less likely a cavitary nodule. There are punctate calcified  granulomas demonstrated bilaterally. No pleural or pericardial effusion.  No pathological mediastinal lymphadenopathy. Calcified coronary artery  disease is present.     The visualized upper abdomen is unremarkable. No pathological axillary  lymphadenopathy.     IMPRESSION:  1.ACR Lung-RADS Category 3. Probably benign. Small cavitary nodular  density favored to represent focal bronchiectasis and less likely a  cavitating nodule. At this time of follow-up with six-month low-dose CT  is recommended. There is additionally diffuse bronchial wall thickening  with areas of mucous plugging favored to represent bronchitis.  2. CTDI 1.43 mGy. DLP 47.8 mGycm.        Radiation dose reduction techniques were utilized, including automated  exposure control and exposure modulation based on body size.     This report was finalized on 6/6/2019       Medical Tests:         Summary of old records / correspondence / consultant report:         Request outside records:          ALLERGIES  Allergies   Allergen Reactions   • Codeine Nausea Only        PFSH:     The following portions of the patient's history were reviewed and updated as appropriate: Allergies / Current Medications / Past Medical History / Surgical History / Social History / Family History    PROBLEM LIST   Patient Active Problem List   Diagnosis   • Primary osteoarthritis of hand   • COPD (chronic obstructive pulmonary disease) (CMS/[facility])   • Cigarette nicotine dependence with nicotine-induced disorder   • Prediabetes   • Hyperlipidemia   • Gastroesophageal reflux disease with esophagitis   • Coronary artery disease involving native coronary artery of native heart without angina pectoris   • Essential hypertension   • Abnormal CT lung screening       PAST MEDICAL HISTORY  Past Medical History:   Diagnosis Date   • Asthma    • COPD (chronic obstructive pulmonary disease) (CMS/[facility])    • Pulmonary nodule        SURGICAL HISTORY  Past Surgical History:   Procedure Laterality Date   • COLONOSCOPY N/A 1/22/2018    Diverticulosis in the sigmoid colon, in the descending colon, in the transverse colon and in the ascending colon, non-bleeding internal hemorrhoids   • ENDOSCOPY N/A 1/22/2018    LA Grade C reflux esophagitis, small hiatal hernia, erythematous mucosa in the stomach, duodenal erosions without bleeding   • HAND SURGERY     • THROAT SURGERY  2012    polyp removal   • TONSILLECTOMY     • VASECTOMY         SOCIAL HISTORY  Social History     Socioeconomic History   • Marital status:      Spouse name: Zeenat*   • Number of children: 2   • Years of education: Not on file   • Highest education level: Not on file   Occupational History   • Occupation:  ([employer])   Tobacco Use   • Smoking status: Current Every Day Smoker     Packs/day: 1.00     Years: 45.00     Pack years: 45.00     Types: Cigarettes   • Smokeless tobacco: Never Used   Substance and Sexual Activity   • Alcohol use: Yes      Alcohol/week: 7.2 oz     Types: 12 Cans of beer per week   • Drug use: No   • Sexual activity: Yes     Partners: Female       FAMILY HISTORY  Family History   Problem Relation Age of Onset   • Esophageal cancer Mother 68        smoker   • Asthma Mother    • Heart attack Father 50        smoker   • Asthma Sister         smoker   • Lung disease Brother    • Asthma Maternal Grandmother    • Diabetes Maternal Grandmother    • Heart attack Maternal Grandfather 75   • Colon cancer Neg Hx    • Prostate cancer Neg Hx          **Dragon Disclaimer:   Much of this encounter note is an electronic transcription/translation of spoken language to printed text. The electronic translation of spoken language may permit erroneous, or at times, nonsensical words or phrases to be inadvertently transcribed. Although I have reviewed the note for such errors, some may still exist.       Template created by Mandy Tyler MD

## 2019-09-16 NOTE — ASSESSMENT & PLAN NOTE
He has follow-up with pulmonologist next month. He was instructed to discuss abnormal CT finding with him. He expressed understanding and agreed to follow above instructions.

## 2019-09-16 NOTE — ASSESSMENT & PLAN NOTE
Lab Results   Component Value Date    HGBA1C 6.1 05/13/2019    HGBA1C 6.30 (H) 01/04/2019    HGBA1C 6.30 (H) 10/02/2018     Lab Results   Component Value Date     (H) 10/02/2018     (H) 10/02/2017     Lab Results   Component Value Date     (H) 10/02/2018     (H) 10/02/2017     Lab Results   Component Value Date    HGBA1C 6.1 05/13/2019    HGBA1C 6.30 (H) 01/04/2019    HGBA1C 6.30 (H) 10/02/2018     BMI Readings from Last 3 Encounters:   09/16/19 28.36 kg/m²   05/13/19 28.88 kg/m²   01/11/19 30.14 kg/m²      Wt Readings from Last 3 Encounters:   09/16/19 92.2 kg (203 lb 3.2 oz)   05/13/19 93.9 kg (207 lb)   01/11/19 98 kg (216 lb)      Check A1c. Maintain a low sugar/starch/carbohydrate diet and exercise regularly. Wt loss advised. Continue metformin  mg BID.

## 2019-09-17 NOTE — PROGRESS NOTES
Call patient with results:    A1c level for blood sugar average is now 6.5 consistent with new onset type 2 diabetes. Have him see Louise in 1-2 weeks for diabetic education and adjustment of medical therapy.

## 2019-09-18 NOTE — PROGRESS NOTES
A1c level for blood sugar average is now 6.5 consistent with new onset type 2 diabetes. Have him see Louise in 1-2 weeks for diabetic education and adjustment of medical therapy.

## 2019-10-01 NOTE — PROGRESS NOTES
[facility] [place] [facility]  ANGELIKA Thompson    Mark L Sturgeon / 64 y.o. / male  10/02/2019    ASSESSMENT & PLAN:    Problem List Items Addressed This Visit        Endocrine    Type 2 diabetes mellitus without complication, without long-term current use of insulin (CMS/[facility]) - Primary (Chronic)    Current Assessment & Plan     Lab Results   Component Value Date    HGBA1C 6.50 (H) 09/16/2019    HGBA1C 6.1 05/13/2019    HGBA1C 6.30 (H) 01/04/2019     A1c elevated: new onset T2DM. Increase Metformin ER to max dose 1000mg BID. Referred to Diabetes Education. Advised weight loss, dietary changes, and order blood glucose monitor (check with insurance). Recheck tolerance and A1c with me in 2-3 months.          Relevant Medications    metFORMIN ER (GLUCOPHAGE-XR) 500 MG 24 hr tablet    Other Relevant Orders    Ambulatory Referral to Diabetic Education        Orders Placed This Encounter   Procedures   • Ambulatory Referral to Diabetic Education     New Medications Ordered This Visit   Medications   • metFORMIN ER (GLUCOPHAGE-XR) 500 MG 24 hr tablet     Sig: TAKE 2 TABLETS BY MOUTH TWICE DAILY WITH MEALS     Dispense:  360 tablet     Refill:  1       Summary/Discussion:  See above    Return in about 2 months (around 12/9/2019), or if symptoms worsen or fail to improve, for recheck DM and,  A1c check 1 week prior .  ____________________________________________________________________    MEDICATIONS  Current Outpatient Medications   Medication Sig Dispense Refill   • albuterol (PROVENTIL HFA;VENTOLIN HFA) 108 (90 Base) MCG/ACT inhaler Inhale 2 puffs Every 4 (Four) Hours As Needed for Wheezing. 6.7 g 3   • aspirin 81 MG EC tablet Take 1 tablet by mouth Daily.     • Cholecalciferol (VITAMIN D PO) Take 1 tablet by mouth Daily.     • Cyanocobalamin (VITAMIN B-12 PO) Take 1 tablet by mouth Daily.     • losartan (COZAAR) 25 MG tablet Take 1 tablet by mouth Daily. 90 tablet 2   • metFORMIN ER (GLUCOPHAGE-XR) 500 MG 24 hr tablet TAKE 2  "TABLETS BY MOUTH TWICE DAILY WITH MEALS 360 tablet 1   • Multiple Vitamins-Minerals (MULTIVITAMIN PO) Take  by mouth.     • Omega-3 Fatty Acids (FISH OIL) 1000 MG capsule capsule Take  by mouth.     • rosuvastatin (CRESTOR) 10 MG tablet Take 1 tablet by mouth Every Night. 90 tablet 2   • umeclidinium-vilanterol (ANORO ELLIPTA) 62.5-25 MCG/INH aerosol powder  inhaler Inhale 1 puff Daily.     • vitamin C (ASCORBIC ACID) 500 MG tablet Take 500 mg by mouth Daily.       No current facility-administered medications for this visit.        VITALS    Visit Vitals  /80   Pulse 51   Temp 97.7 °F (36.5 °C) (Temporal)   Ht 180.3 cm (70.98\")   Wt 90.3 kg (199 lb)   SpO2 93%   BMI 27.77 kg/m²       BP Readings from Last 3 Encounters:   10/02/19 124/80   09/16/19 132/82   05/13/19 138/74     Wt Readings from Last 3 Encounters:   10/02/19 90.3 kg (199 lb)   09/16/19 92.2 kg (203 lb 3.2 oz)   05/13/19 93.9 kg (207 lb)      Body mass index is 27.77 kg/m².    CC:  Main reason(s) for today's visit: Follow-up for diabetes, new onset    HPI:   A1c level for blood sugar average is now 6.5 consistent with new onset type 2 diabetes. He presents today for diabetic education and adjustment of medical therapy. This is a new patient to me and new problem to me.     Chronic type 2 diabetes:  Current therapy: metformin/metformin ER.  Most recent change to treatment plan: no recent change.  Home blood sugar levels: does not check.   Most recent relevant labs:   Lab Results   Component Value Date    HGBA1C 6.50 (H) 09/16/2019    HGBA1C 6.1 05/13/2019    HGBA1C 6.30 (H) 01/04/2019    CREATININE 0.71 (L) 09/16/2019    LDL 60 01/04/2019       Patient Care Team:  Edmundo Tyler MD as PCP - General (Internal Medicine)  Baylee Galaviz MD as Consulting Physician (Pulmonary Disease)  ____________________________________________________________________    REVIEW OF SYSTEMS  As noted per HPI  Constitutional neg  Resp neg  CV neg    PHYSICAL " EXAMINATION  Constitutional  No distress  Cardiovascular Rate  normal . Rhythm: regular . Heart sounds:  normal  Pulmonary/Chest  Effort normal. Breath sounds:  normal  Psychiatric  Alert. Judgment and thought content normal. Mood normal    REVIEWED DATA:    Labs:   Lab Results   Component Value Date     09/16/2019    K 4.3 09/16/2019    AST 9 09/16/2019    ALT 12 09/16/2019    BUN 11 09/16/2019    CREATININE 0.71 (L) 09/16/2019    CREATININE 0.79 10/02/2018    CREATININE 0.90 10/02/2017    EGFRIFNONA 111 09/16/2019    EGFRIFAFRI 135 09/16/2019       Lab Results   Component Value Date    HGBA1C 6.50 (H) 09/16/2019    HGBA1C 6.1 05/13/2019    HGBA1C 6.30 (H) 01/04/2019    GLUCOSE 142 (H) 08/29/2014       Lab Results   Component Value Date    LDL 60 01/04/2019     (H) 10/02/2018     (H) 10/02/2017    HDL 40 01/04/2019    HDL 49 10/02/2018    TRIG 108 01/04/2019    CHOLHDLRATIO 3.05 01/04/2019       Lab Results   Component Value Date    TSH 2.870 10/02/2018    FREET4 1.20 10/02/2018          Lab Results   Component Value Date    WBC 9.85 01/04/2019    HGB 14.9 01/04/2019    HGB 15.5 10/02/2018    HGB 16.0 10/02/2017     01/04/2019        Lab Results   Component Value Date    PROTEIN Negative 10/02/2018    GLUCOSEU Negative 10/02/2018    BLOODU Negative 10/02/2018    NITRITEU Negative 10/02/2018    LEUKOCYTESUR Negative 10/02/2018       Imaging:          Medical Tests:          Summary of old records / correspondence / consultant report:          Request outside records:          ALLERGIES  Allergies   Allergen Reactions   • Codeine Nausea Only        PFSH:     The following portions of the patient's history were reviewed and updated as appropriate: Allergies / Current Medications / Past Medical History / Surgical History / Social History / Family History    PROBLEM LIST   Patient Active Problem List   Diagnosis   • Primary osteoarthritis of hand   • COPD (chronic obstructive pulmonary  disease) (CMS/[facility])   • Cigarette nicotine dependence with nicotine-induced disorder   • Type 2 diabetes mellitus without complication, without long-term current use of insulin (CMS/[facility])   • Hyperlipidemia   • Gastroesophageal reflux disease with esophagitis   • Coronary artery disease involving native coronary artery of native heart without angina pectoris   • Essential hypertension   • Abnormal CT lung screening       PAST MEDICAL HISTORY  Past Medical History:   Diagnosis Date   • Asthma    • COPD (chronic obstructive pulmonary disease) (CMS/[facility])    • Pulmonary nodule        SURGICAL HISTORY  Past Surgical History:   Procedure Laterality Date   • COLONOSCOPY N/A 1/22/2018    Diverticulosis in the sigmoid colon, in the descending colon, in the transverse colon and in the ascending colon, non-bleeding internal hemorrhoids   • ENDOSCOPY N/A 1/22/2018    LA Grade C reflux esophagitis, small hiatal hernia, erythematous mucosa in the stomach, duodenal erosions without bleeding   • HAND SURGERY     • THROAT SURGERY  2012    polyp removal   • TONSILLECTOMY     • VASECTOMY         SOCIAL HISTORY  Social History     Socioeconomic History   • Marital status:      Spouse name: Zeenat*   • Number of children: 2   • Years of education: Not on file   • Highest education level: Not on file   Occupational History   • Occupation:  ([employer])   Tobacco Use   • Smoking status: Current Every Day Smoker     Packs/day: 1.00     Years: 45.00     Pack years: 45.00     Types: Cigarettes   • Smokeless tobacco: Never Used   Substance and Sexual Activity   • Alcohol use: Yes     Alcohol/week: 7.2 oz     Types: 12 Cans of beer per week   • Drug use: No   • Sexual activity: Yes     Partners: Female       FAMILY HISTORY  Family History   Problem Relation Age of Onset   • Esophageal cancer Mother 68        smoker   • Asthma Mother    • Heart attack Father 50        smoker   • Asthma Sister         smoker   • Lung  disease Brother    • Asthma Maternal Grandmother    • Diabetes Maternal Grandmother    • Heart attack Maternal Grandfather 75   • Colon cancer Neg Hx    • Prostate cancer Neg Hx        Answers for HPI/ROS submitted by the patient on 9/30/2019   Diabetes problem  Diabetes type: type 1  MedicAlert ID: No  Disease duration: 2 weeks  blurred vision: No  foot paresthesias: No  foot ulcerations: No  visual change: No  weight loss: Yes  Symptom course: stable  hunger: No  mood changes: No  sleepiness: No  sweats: Yes  blackouts: No  hospitalization: No  nocturnal hypoglycemia: No  required assistance: No  required glucagon: No  CVA: No  heart disease: Yes  impotence: No  nephropathy: No  peripheral neuropathy: No  PVD: No  retinopathy: No  CAD risks: dyslipidemia, family history, tobacco exposure  Current treatments: oral agent (monotherapy)  Treatment compliance: none of the time  Weight trend: fluctuating minimally  Exercise: rarely  Dietitian visit: No  Eye exam current: Yes  Sees podiatrist: No

## 2019-10-02 ENCOUNTER — OFFICE VISIT (OUTPATIENT)
Dept: INTERNAL MEDICINE | Age: 64
End: 2019-10-02

## 2019-10-02 VITALS
OXYGEN SATURATION: 93 % | HEART RATE: 51 BPM | BODY MASS INDEX: 27.86 KG/M2 | SYSTOLIC BLOOD PRESSURE: 124 MMHG | WEIGHT: 199 LBS | TEMPERATURE: 97.7 F | DIASTOLIC BLOOD PRESSURE: 80 MMHG | HEIGHT: 71 IN

## 2019-10-02 DIAGNOSIS — E11.9 TYPE 2 DIABETES MELLITUS WITHOUT COMPLICATION, WITHOUT LONG-TERM CURRENT USE OF INSULIN (HCC): Primary | ICD-10-CM

## 2019-10-02 DIAGNOSIS — R73.03 PREDIABETES: Chronic | ICD-10-CM

## 2019-10-02 PROCEDURE — 99213 OFFICE O/P EST LOW 20 MIN: CPT | Performed by: NURSE PRACTITIONER

## 2019-10-02 RX ORDER — METFORMIN HYDROCHLORIDE 500 MG/1
TABLET, EXTENDED RELEASE ORAL
Qty: 360 TABLET | Refills: 1 | Status: SHIPPED | OUTPATIENT
Start: 2019-10-02 | End: 2019-12-26

## 2019-10-02 NOTE — ASSESSMENT & PLAN NOTE
Lab Results   Component Value Date    HGBA1C 6.50 (H) 09/16/2019    HGBA1C 6.1 05/13/2019    HGBA1C 6.30 (H) 01/04/2019     A1c elevated: new onset T2DM. Increase Metformin ER to max dose 1000mg BID. Referred to Diabetes Education. Advised weight loss, dietary changes, and order blood glucose monitor (check with insurance). Recheck tolerance and A1c with me in 2-3 months.

## 2019-11-06 ENCOUNTER — APPOINTMENT (OUTPATIENT)
Dept: DIABETES SERVICES | Facility: HOSPITAL | Age: 64
End: 2019-11-06

## 2019-11-07 ENCOUNTER — HOSPITAL ENCOUNTER (OUTPATIENT)
Dept: DIABETES SERVICES | Facility: HOSPITAL | Age: 64
Discharge: HOME | End: 2019-11-07
Admitting: NURSE PRACTITIONER

## 2019-11-07 PROCEDURE — G0108 DIAB MANAGE TRN  PER INDIV: HCPCS

## 2019-11-21 DIAGNOSIS — Z00.00 ENCOUNTER FOR ANNUAL HEALTH EXAMINATION: Primary | ICD-10-CM

## 2019-11-22 DIAGNOSIS — Z00.00 ENCOUNTER FOR ANNUAL HEALTH EXAMINATION: ICD-10-CM

## 2019-11-25 LAB
ALBUMIN SERPL-MCNC: 4.7 G/DL (ref 3.5–5.2)
ALBUMIN/GLOB SERPL: 1.9 G/DL
ALP SERPL-CCNC: 61 U/L (ref 39–117)
ALT SERPL-CCNC: 9 U/L (ref 1–41)
APPEARANCE UR: CLEAR
AST SERPL-CCNC: 12 U/L (ref 1–40)
BACTERIA #/AREA URNS HPF: ABNORMAL /HPF
BASOPHILS # BLD AUTO: 0.05 10*3/MM3 (ref 0–0.2)
BASOPHILS NFR BLD AUTO: 0.5 % (ref 0–1.5)
BILIRUB SERPL-MCNC: 0.4 MG/DL (ref 0.2–1.2)
BILIRUB UR QL STRIP: NEGATIVE
BUN SERPL-MCNC: 10 MG/DL (ref 8–23)
BUN/CREAT SERPL: 13.7 (ref 7–25)
CALCIUM SERPL-MCNC: 9.7 MG/DL (ref 8.6–10.5)
CHLORIDE SERPL-SCNC: 98 MMOL/L (ref 98–107)
CHOLEST SERPL-MCNC: 75 MG/DL (ref 0–200)
CHOLEST/HDLC SERPL: 2.14 {RATIO}
CO2 SERPL-SCNC: 27.9 MMOL/L (ref 22–29)
COLOR UR: YELLOW
CREAT SERPL-MCNC: 0.73 MG/DL (ref 0.76–1.27)
CRYSTALS URNS MICRO: ABNORMAL
EOSINOPHIL # BLD AUTO: 0.18 10*3/MM3 (ref 0–0.4)
EOSINOPHIL NFR BLD AUTO: 1.8 % (ref 0.3–6.2)
EPI CELLS #/AREA URNS HPF: ABNORMAL /HPF
ERYTHROCYTE [DISTWIDTH] IN BLOOD BY AUTOMATED COUNT: 11.8 % (ref 12.3–15.4)
GLOBULIN SER CALC-MCNC: 2.5 GM/DL
GLUCOSE SERPL-MCNC: 101 MG/DL (ref 65–99)
GLUCOSE UR QL: NEGATIVE
HBA1C MFR BLD: 6.1 % (ref 4.8–5.6)
HCT VFR BLD AUTO: 42.4 % (ref 37.5–51)
HDLC SERPL-MCNC: 35 MG/DL (ref 40–60)
HGB BLD-MCNC: 14.5 G/DL (ref 13–17.7)
HGB UR QL STRIP: NEGATIVE
IMM GRANULOCYTES # BLD AUTO: 0.03 10*3/MM3 (ref 0–0.05)
IMM GRANULOCYTES NFR BLD AUTO: 0.3 % (ref 0–0.5)
KETONES UR QL STRIP: ABNORMAL
LDLC SERPL CALC-MCNC: 26 MG/DL (ref 0–100)
LEUKOCYTE ESTERASE UR QL STRIP: ABNORMAL
LYMPHOCYTES # BLD AUTO: 1.97 10*3/MM3 (ref 0.7–3.1)
LYMPHOCYTES NFR BLD AUTO: 20 % (ref 19.6–45.3)
MCH RBC QN AUTO: 31.3 PG (ref 26.6–33)
MCHC RBC AUTO-ENTMCNC: 34.2 G/DL (ref 31.5–35.7)
MCV RBC AUTO: 91.4 FL (ref 79–97)
MONOCYTES # BLD AUTO: 0.84 10*3/MM3 (ref 0.1–0.9)
MONOCYTES NFR BLD AUTO: 8.5 % (ref 5–12)
NEUTROPHILS # BLD AUTO: 6.79 10*3/MM3 (ref 1.7–7)
NEUTROPHILS NFR BLD AUTO: 68.9 % (ref 42.7–76)
NITRITE UR QL STRIP: NEGATIVE
NRBC BLD AUTO-RTO: 0 /100 WBC (ref 0–0.2)
PH UR STRIP: 5.5 [PH] (ref 5–8)
PLATELET # BLD AUTO: 245 10*3/MM3 (ref 140–450)
POTASSIUM SERPL-SCNC: 4.3 MMOL/L (ref 3.5–5.2)
PROT SERPL-MCNC: 7.2 G/DL (ref 6–8.5)
PROT UR QL STRIP: ABNORMAL
PSA SERPL-MCNC: 0.91 NG/ML (ref 0–4)
RBC # BLD AUTO: 4.64 10*6/MM3 (ref 4.14–5.8)
RBC #/AREA URNS HPF: ABNORMAL /HPF
SODIUM SERPL-SCNC: 141 MMOL/L (ref 136–145)
SP GR UR: 1.03 (ref 1–1.03)
T4 FREE SERPL-MCNC: 1.32 NG/DL (ref 0.93–1.7)
TRIGL SERPL-MCNC: 71 MG/DL (ref 0–150)
TSH SERPL DL<=0.005 MIU/L-ACNC: 2.05 UIU/ML (ref 0.27–4.2)
UROBILINOGEN UR STRIP-MCNC: ABNORMAL MG/DL
VLDLC SERPL CALC-MCNC: 14.2 MG/DL
WBC # BLD AUTO: 9.86 10*3/MM3 (ref 3.4–10.8)
WBC #/AREA URNS HPF: ABNORMAL /HPF

## 2019-12-02 ENCOUNTER — OFFICE VISIT (OUTPATIENT)
Dept: INTERNAL MEDICINE | Age: 64
End: 2019-12-02

## 2019-12-02 VITALS
WEIGHT: 197 LBS | TEMPERATURE: 97.1 F | HEART RATE: 76 BPM | OXYGEN SATURATION: 93 % | HEIGHT: 71 IN | SYSTOLIC BLOOD PRESSURE: 122 MMHG | DIASTOLIC BLOOD PRESSURE: 80 MMHG | BODY MASS INDEX: 27.58 KG/M2

## 2019-12-02 DIAGNOSIS — E11.9 TYPE 2 DIABETES MELLITUS WITHOUT COMPLICATION, WITHOUT LONG-TERM CURRENT USE OF INSULIN (HCC): Primary | Chronic | ICD-10-CM

## 2019-12-02 PROCEDURE — 99213 OFFICE O/P EST LOW 20 MIN: CPT | Performed by: NURSE PRACTITIONER

## 2019-12-02 RX ORDER — DIPHENHYDRAMINE HYDROCHLORIDE 25 MG/1
1 CAPSULE, LIQUID FILLED ORAL 2 TIMES DAILY
Qty: 1 EACH | Refills: 12 | Status: SHIPPED | OUTPATIENT
Start: 2019-12-02 | End: 2022-05-04

## 2019-12-09 DIAGNOSIS — Z00.00 ENCOUNTER FOR ANNUAL HEALTH EXAMINATION: Primary | ICD-10-CM

## 2019-12-26 DIAGNOSIS — R73.03 PREDIABETES: Chronic | ICD-10-CM

## 2019-12-26 RX ORDER — METFORMIN HYDROCHLORIDE 500 MG/1
TABLET, EXTENDED RELEASE ORAL
Qty: 180 TABLET | Refills: 3 | Status: SHIPPED | OUTPATIENT
Start: 2019-12-26 | End: 2019-12-27 | Stop reason: SDUPTHER

## 2019-12-27 ENCOUNTER — TELEPHONE (OUTPATIENT)
Dept: INTERNAL MEDICINE | Age: 64
End: 2019-12-27

## 2019-12-27 DIAGNOSIS — R73.03 PREDIABETES: Chronic | ICD-10-CM

## 2019-12-27 RX ORDER — METFORMIN HYDROCHLORIDE 500 MG/1
1000 TABLET, EXTENDED RELEASE ORAL 2 TIMES DAILY
Qty: 120 TABLET | Refills: 3 | Status: SHIPPED | OUTPATIENT
Start: 2019-12-27 | End: 2020-12-14 | Stop reason: SDUPTHER

## 2019-12-27 NOTE — TELEPHONE ENCOUNTER
On DOS 10/2/19, pt seen Louise & she prescribed Metformin ER max dose 1000mg BID. Pt has been taking 2 tabs twice daily.    Then yesterday, Thursday, 12/26/19 a rx refill request came through & Dr. Tyler prescribed Metformin 500mg, take 1 tab twice daily.    Pt's insurance will not let pt have the rx refill. [facility] pharm is telling the pt it is to early due to change in rx directions.    Pls clarify rx directions.    Jeannie #[phone].    Pt can be reached #[phone].

## 2020-01-06 ENCOUNTER — OFFICE VISIT (OUTPATIENT)
Dept: INTERNAL MEDICINE | Age: 65
End: 2020-01-06

## 2020-01-06 VITALS
BODY MASS INDEX: 28.56 KG/M2 | HEART RATE: 85 BPM | WEIGHT: 204 LBS | DIASTOLIC BLOOD PRESSURE: 70 MMHG | OXYGEN SATURATION: 94 % | HEIGHT: 71 IN | TEMPERATURE: 97.5 F | SYSTOLIC BLOOD PRESSURE: 120 MMHG

## 2020-01-06 DIAGNOSIS — E11.9 TYPE 2 DIABETES MELLITUS WITHOUT COMPLICATION, WITHOUT LONG-TERM CURRENT USE OF INSULIN (HCC): Primary | Chronic | ICD-10-CM

## 2020-01-06 PROCEDURE — 99213 OFFICE O/P EST LOW 20 MIN: CPT | Performed by: NURSE PRACTITIONER

## 2020-01-06 NOTE — ASSESSMENT & PLAN NOTE
Home blood glucose 30d average: 118. Tolerating Metformin dosing. Continue current dose: Metformin ER 1000mg BID. Follow up A1c with next fasting labs at March appointment.

## 2020-01-06 NOTE — PROGRESS NOTES
[facility] INTERNAL MEDICINE  Louise ANGELIKA Chong      Mark L Sturgeon / 64 y.o. / male  01/06/2020      ASSESSMENT & PLAN:    Problem List Items Addressed This Visit        Endocrine    Type 2 diabetes mellitus without complication, without long-term current use of insulin (CMS/[facility]) - Primary (Chronic)    Current Assessment & Plan     Home blood glucose 30d average: 118. Tolerating Metformin dosing. Continue current dose: Metformin ER 1000mg BID. Follow up A1c with next fasting labs at March appointment.          Relevant Medications    Blood Glucose Monitoring Suppl (BLOOD GLUCOSE MONITOR SYSTEM) w/Device kit    metFORMIN ER (GLUCOPHAGE-XR) 500 MG 24 hr tablet        No orders of the defined types were placed in this encounter.    No orders of the defined types were placed in this encounter.      Summary/Discussion:  See above. A1c at follow up.     Return if symptoms worsen or fail to improve, for Next scheduled follow up.  ____________________________________________________________________    MEDICATIONS  Current Outpatient Medications   Medication Sig Dispense Refill   • albuterol (PROVENTIL HFA;VENTOLIN HFA) 108 (90 Base) MCG/ACT inhaler Inhale 2 puffs Every 4 (Four) Hours As Needed for Wheezing. 6.7 g 3   • aspirin 81 MG EC tablet Take 1 tablet by mouth Daily.     • Blood Glucose Monitoring Suppl (BLOOD GLUCOSE MONITOR SYSTEM) w/Device kit 1 kit 2 (Two) Times a Day. 1 each 12   • Cholecalciferol (VITAMIN D PO) Take 1 tablet by mouth Daily.     • Cyanocobalamin (VITAMIN B-12 PO) Take 1 tablet by mouth Daily.     • glucose blood test strip Use as instructed 100 each 12   • Lancets (ACCU-CHEK SOFT TOUCH) lancets Check blood sugar twice daily. 100 each 12   • losartan (COZAAR) 25 MG tablet Take 1 tablet by mouth Daily. 90 tablet 2   • metFORMIN ER (GLUCOPHAGE-XR) 500 MG 24 hr tablet Take 2 tablets by mouth 2 (Two) Times a Day. TAKE 2 TABLET BY MOUTH TWICE DAILY WITH MEALS 120 tablet 3   • Multiple Vitamins-Minerals  "(MULTIVITAMIN PO) Take  by mouth.     • Omega-3 Fatty Acids (FISH OIL) 1000 MG capsule capsule Take  by mouth.     • rosuvastatin (CRESTOR) 10 MG tablet Take 1 tablet by mouth Every Night. 90 tablet 2   • umeclidinium-vilanterol (ANORO ELLIPTA) 62.5-25 MCG/INH aerosol powder  inhaler Inhale 1 puff Daily.     • vitamin C (ASCORBIC ACID) 500 MG tablet Take 500 mg by mouth Daily.       No current facility-administered medications for this visit.        VITALS    Visit Vitals  /70   Pulse 85   Temp 97.5 °F (36.4 °C) (Temporal)   Ht 180.3 cm (70.98\")   Wt 92.5 kg (204 lb)   SpO2 94%   BMI 28.47 kg/m²       BP Readings from Last 3 Encounters:   01/06/20 120/70   12/02/19 122/80   10/02/19 124/80     Wt Readings from Last 3 Encounters:   01/06/20 92.5 kg (204 lb)   12/02/19 89.4 kg (197 lb)   10/02/19 90.3 kg (199 lb)      Body mass index is 28.47 kg/m².    CC:  Main reason(s) for today's visit: 2 month Follow-up for diabetes    HPI:     Chronic type 2 diabetes:  Current therapy: metformin/metformin ER.  Most recent change to treatment plan: Metformin ER dosage was increased.  Home blood sugar levels: better than before, 30 day average is 118, no significant lows (lowest: 98).   Most recent relevant labs:   Lab Results   Component Value Date    HGBA1C 6.10 (H) 11/25/2019    HGBA1C 6.50 (H) 09/16/2019    HGBA1C 6.1 05/13/2019    CREATININE 0.73 (L) 11/25/2019    LDL 26 11/25/2019       Patient Care Team:  Edmundo Tyler MD as PCP - General (Internal Medicine)  Baylee Galaviz MD as Consulting Physician (Pulmonary Disease)  ____________________________________________________________________    REVIEW OF SYSTEMS    Review of Systems  As noted per HPI  Constitutional neg  Resp neg  CV neg      PHYSICAL EXAMINATION    Physical Exam  Constitutional  No distress  Cardiovascular Rate  normal . Rhythm: regular . Heart sounds:  normal  Pulmonary/Chest  Effort normal. Breath sounds:  normal  Psychiatric  Alert. Judgment and " thought content normal. Mood normal    REVIEWED DATA:    Labs:   Lab Results   Component Value Date     11/25/2019    K 4.3 11/25/2019    AST 12 11/25/2019    ALT 9 11/25/2019    BUN 10 11/25/2019    CREATININE 0.73 (L) 11/25/2019    CREATININE 0.71 (L) 09/16/2019    CREATININE 0.79 10/02/2018    EGFRIFNONA 107 11/25/2019    EGFRIFAFRI 130 11/25/2019       Lab Results   Component Value Date    HGBA1C 6.10 (H) 11/25/2019    HGBA1C 6.50 (H) 09/16/2019    HGBA1C 6.1 05/13/2019    GLUCOSE 142 (H) 08/29/2014       Lab Results   Component Value Date    LDL 26 11/25/2019    LDL 60 01/04/2019     (H) 10/02/2018    HDL 35 (L) 11/25/2019    HDL 40 01/04/2019    TRIG 71 11/25/2019    CHOLHDLRATIO 2.14 11/25/2019       Lab Results   Component Value Date    TSH 2.050 11/25/2019    FREET4 1.32 11/25/2019          Lab Results   Component Value Date    WBC 9.86 11/25/2019    HGB 14.5 11/25/2019    HGB 14.9 01/04/2019    HGB 15.5 10/02/2018     11/25/2019        Lab Results   Component Value Date    PROTEIN See below: (A) 11/25/2019    GLUCOSEU Negative 11/25/2019    BLOODU Negative 11/25/2019    NITRITEU Negative 11/25/2019    LEUKOCYTESUR Trace (A) 11/25/2019       Imaging:          Medical Tests:          Summary of old records / correspondence / consultant report:          Request outside records:          ALLERGIES  Allergies   Allergen Reactions   • Codeine Nausea Only        PFSH:     The following portions of the patient's history were reviewed and updated as appropriate: Allergies / Current Medications / Past Medical History / Surgical History / Social History / Family History    PROBLEM LIST   Patient Active Problem List   Diagnosis   • Primary osteoarthritis of hand   • COPD (chronic obstructive pulmonary disease) (CMS/[facility])   • Cigarette nicotine dependence with nicotine-induced disorder   • Type 2 diabetes mellitus without complication, without long-term current use of insulin (CMS/[facility])   •  Hyperlipidemia   • Gastroesophageal reflux disease with esophagitis   • Coronary artery disease involving native coronary artery of native heart without angina pectoris   • Essential hypertension   • Abnormal CT lung screening       PAST MEDICAL HISTORY  Past Medical History:   Diagnosis Date   • Asthma    • COPD (chronic obstructive pulmonary disease) (CMS/[facility])    • Pulmonary nodule        SURGICAL HISTORY  Past Surgical History:   Procedure Laterality Date   • COLONOSCOPY N/A 1/22/2018    Diverticulosis in the sigmoid colon, in the descending colon, in the transverse colon and in the ascending colon, non-bleeding internal hemorrhoids   • ENDOSCOPY N/A 1/22/2018    LA Grade C reflux esophagitis, small hiatal hernia, erythematous mucosa in the stomach, duodenal erosions without bleeding   • HAND SURGERY     • THROAT SURGERY  2012    polyp removal   • TONSILLECTOMY     • VASECTOMY         SOCIAL HISTORY  Social History     Socioeconomic History   • Marital status:      Spouse name: Zeenat*   • Number of children: 2   • Years of education: Not on file   • Highest education level: Not on file   Occupational History   • Occupation:  ([employer])   Tobacco Use   • Smoking status: Current Every Day Smoker     Packs/day: 1.00     Years: 45.00     Pack years: 45.00     Types: Cigarettes   • Smokeless tobacco: Never Used   Substance and Sexual Activity   • Alcohol use: Yes     Alcohol/week: 12.0 standard drinks     Types: 12 Cans of beer per week   • Drug use: No   • Sexual activity: Yes     Partners: Female       FAMILY HISTORY  Family History   Problem Relation Age of Onset   • Esophageal cancer Mother 68        smoker   • Asthma Mother    • Heart attack Father 50        smoker   • Asthma Sister         smoker   • Lung disease Brother    • Asthma Maternal Grandmother    • Diabetes Maternal Grandmother    • Heart attack Maternal Grandfather 75   • Colon cancer Neg Hx    • Prostate cancer Neg Hx

## 2020-01-20 ENCOUNTER — TELEPHONE (OUTPATIENT)
Dept: INTERNAL MEDICINE | Age: 65
End: 2020-01-20

## 2020-01-20 DIAGNOSIS — E11.9 TYPE 2 DIABETES MELLITUS WITHOUT COMPLICATION, WITHOUT LONG-TERM CURRENT USE OF INSULIN (HCC): Primary | ICD-10-CM

## 2020-01-20 RX ORDER — BLOOD-GLUCOSE METER
1 EACH MISCELLANEOUS DAILY
Qty: 1 KIT | Refills: 0 | Status: SHIPPED | OUTPATIENT
Start: 2020-01-20 | End: 2022-05-04

## 2020-01-20 RX ORDER — LANCETS
EACH MISCELLANEOUS
Qty: 100 EACH | Refills: 11 | Status: SHIPPED | OUTPATIENT
Start: 2020-01-20

## 2020-01-20 NOTE — TELEPHONE ENCOUNTER
----- Message from Mark L. Sturgeon sent at 1/20/2020 11:44 AM EST -----  Regarding: Prescription Question  Contact: [phone]  Insurance is changing glucose monitoring meter to OneTouch Verio meter. and will no longer cover Accucheck system that I presently use. Can you adjust my prescription for test strips & lancets accordingly?

## 2020-01-21 ENCOUNTER — TELEPHONE (OUTPATIENT)
Dept: INTERNAL MEDICINE | Age: 65
End: 2020-01-21

## 2020-03-16 ENCOUNTER — OFFICE VISIT (OUTPATIENT)
Dept: INTERNAL MEDICINE | Age: 65
End: 2020-03-16

## 2020-03-16 VITALS
OXYGEN SATURATION: 98 % | SYSTOLIC BLOOD PRESSURE: 120 MMHG | WEIGHT: 202 LBS | BODY MASS INDEX: 28.28 KG/M2 | TEMPERATURE: 97.5 F | DIASTOLIC BLOOD PRESSURE: 62 MMHG | HEART RATE: 72 BPM | HEIGHT: 71 IN

## 2020-03-16 DIAGNOSIS — E11.9 TYPE 2 DIABETES MELLITUS WITHOUT COMPLICATION, WITHOUT LONG-TERM CURRENT USE OF INSULIN (HCC): Primary | Chronic | ICD-10-CM

## 2020-03-16 DIAGNOSIS — I10 ESSENTIAL HYPERTENSION: Chronic | ICD-10-CM

## 2020-03-16 DIAGNOSIS — E78.5 HYPERLIPIDEMIA, UNSPECIFIED HYPERLIPIDEMIA TYPE: Chronic | ICD-10-CM

## 2020-03-16 LAB — HBA1C MFR BLD: 6.3 %

## 2020-03-16 PROCEDURE — 99214 OFFICE O/P EST MOD 30 MIN: CPT | Performed by: INTERNAL MEDICINE

## 2020-03-16 PROCEDURE — 83036 HEMOGLOBIN GLYCOSYLATED A1C: CPT | Performed by: INTERNAL MEDICINE

## 2020-03-16 RX ORDER — NICOTINE 21 MG/24HR
PATCH, TRANSDERMAL 24 HOURS TRANSDERMAL
COMMUNITY
Start: 2020-03-03 | End: 2022-02-27 | Stop reason: HOSPADM

## 2020-03-16 RX ORDER — NICOTINE POLACRILEX 4 MG/1
LOZENGE ORAL
COMMUNITY
Start: 2020-03-03 | End: 2022-02-27 | Stop reason: HOSPADM

## 2020-03-16 NOTE — PROGRESS NOTES
[facility] INTERNAL MEDICINE  SYMONE BRICEÑO M.D.      Mark L Sturgeon / 64 y.o. / male  03/16/2020      ASSESSMENT & PLAN:    Problem List Items Addressed This Visit        High    Type 2 diabetes mellitus without complication, without long-term current use of insulin (CMS/[facility]) - Primary (Chronic)    Current Assessment & Plan     Lab Results   Component Value Date    HGBA1C 6.3 03/16/2020    HGBA1C 6.10 (H) 11/25/2019    HGBA1C 6.50 (H) 09/16/2019      Has been taking metformin  mg 1 BID x 6 weeks.   A1c today is 6.3.  Increase metformin  mg back up to 2 BID.   Follow-up 4 months.          Relevant Medications    Blood Glucose Monitoring Suppl (BLOOD GLUCOSE MONITOR SYSTEM) w/Device kit    metFORMIN ER (GLUCOPHAGE-XR) 500 MG 24 hr tablet    glucose blood (ONETOUCH VERIO) test strip    Blood Glucose Monitoring Suppl (ONETOUCH VERIO) w/Device kit    Lancets (ONETOUCH ULTRASOFT) lancets    Other Relevant Orders    POC Glycosylated Hemoglobin (Hb A1C) (Completed)       Medium    Hyperlipidemia (Chronic)    Overview     Continue rosuvastatin 10 mg daily.          Relevant Medications    rosuvastatin (CRESTOR) 10 MG tablet    Essential hypertension (Chronic)    Current Assessment & Plan     Continue losartan 25 mg qd.          Relevant Medications    losartan (COZAAR) 25 MG tablet        Orders Placed This Encounter   Procedures   • POC Glycosylated Hemoglobin (Hb A1C)     No orders of the defined types were placed in this encounter.      Summary/Discussion:  Advance Care Planning   ACP discussion was held with the patient during this visit. Patient does not have an advance directive, information provided.      Next Appointment with me: Visit date not found    Return in about 4 months (around 7/16/2020) for Diabetes, Hypertension, Hyperlipidemia.  ____________________________________________________________________    MEDICATIONS  Current Outpatient Medications   Medication Sig Dispense Refill   • aspirin 81 MG EC  "tablet Take 1 tablet by mouth Daily.     • Blood Glucose Monitoring Suppl (BLOOD GLUCOSE MONITOR SYSTEM) w/Device kit 1 kit 2 (Two) Times a Day. 1 each 12   • Blood Glucose Monitoring Suppl (ONETOUCH VERIO) w/Device kit 1 kit Daily. 1 kit 0   • Cholecalciferol (VITAMIN D PO) Take 1 tablet by mouth Daily.     • Cyanocobalamin (VITAMIN B-12 PO) Take 1 tablet by mouth Daily.     • glucose blood (ONETOUCH VERIO) test strip Give patient what insurance cover . 100 each 11   • Lancets (ONETOUCH ULTRASOFT) lancets Use to test glucose once daily.  Dx DM2  E11.9 100 each 11   • losartan (COZAAR) 25 MG tablet Take 1 tablet by mouth Daily. 90 tablet 2   • metFORMIN ER (GLUCOPHAGE-XR) 500 MG 24 hr tablet Was taking 1 BID x 6 weeks 120 tablet 3   • Multiple Vitamins-Minerals (MULTIVITAMIN PO) Take  by mouth.     • Omega-3 Fatty Acids (FISH OIL) 1000 MG capsule capsule Take  by mouth.     • rosuvastatin (CRESTOR) 10 MG tablet Take 1 tablet by mouth Every Night. 90 tablet 2   • Tiotropium Bromide-Olodaterol (STIOLTO RESPIMAT IN) Inhale. 2 puff daily     • vitamin C (ASCORBIC ACID) 500 MG tablet Take 500 mg by mouth Daily.     • nicotine (NICODERM CQ) 21 MG/24HR patch APPLY ONE PATCH DAILY     • NICOTINE MINI 4 MG lozenge 1 LOZENGE Q 1 TO 2 H PER PACK INSTRUCTIONS       No current facility-administered medications for this visit.        VITALS    Visit Vitals  /62   Pulse 72   Temp 97.5 °F (36.4 °C)   Ht 180.3 cm (70.98\")   Wt 91.6 kg (202 lb)   SpO2 98%   BMI 28.19 kg/m²       BP Readings from Last 3 Encounters:   03/16/20 120/62   01/06/20 120/70   12/02/19 122/80     Wt Readings from Last 3 Encounters:   03/16/20 91.6 kg (202 lb)   01/06/20 92.5 kg (204 lb)   12/02/19 89.4 kg (197 lb)    Body mass index is 28.19 kg/m².    CC:  Main reason(s) for today's visit: Follow-up for diabetes and related medical problems    HPI:     Chronic type 2 diabetes:  Diabetic complications: none identified. Current therapy: " metformin/metformin ER.  Most recent change to treatment plan: for last 6 weeks he decreased metformin to 1 tablet bid.  Home blood sugar levels: consistently mildly high, has no significant low sugar symptoms/problems.   Most recent relevant labs:   Lab Results   Component Value Date    HGBA1C 6.3 03/16/2020    HGBA1C 6.10 (H) 11/25/2019    HGBA1C 6.50 (H) 09/16/2019    CREATININE 0.73 (L) 11/25/2019    LDL 26 11/25/2019       Chronic essential hypertension:  Since prior visit: compliant with medication(s) and denies significant problems with medication(s).  Most recent in-office blood pressure readings:   BP Readings from Last 3 Encounters:   03/16/20 120/62   01/06/20 120/70   12/02/19 122/80       Chronic hyperlipidemia:  Current therapy include rosuvastatin, denies problems with medication.    Most recent labs:   Lab Results   Component Value Date    LDL 26 11/25/2019    LDL 60 01/04/2019     (H) 10/02/2018    HDL 35 (L) 11/25/2019    HDL 40 01/04/2019    HDL 49 10/02/2018    TRIG 71 11/25/2019    CHOLHDLRATIO 2.14 11/25/2019    CHOLHDLRATIO 3.05 01/04/2019    CHOLHDLRATIO 3.69 10/02/2018          Patient Care Team:  Edmundo Tyler MD as PCP - General (Internal Medicine)  Baylee Galaviz MD as Consulting Physician (Pulmonary Disease)  ____________________________________________________________________    REVIEW OF SYSTEMS    Review of Systems  As noted per HPI  Constitutional neg  Resp neg for worsening cough or shortness of breath   CV neg      PHYSICAL EXAMINATION    Physical Exam  Constitutional  No distress, overweight   Psychiatric  Alert. Judgment and thought content normal. Mood normal    REVIEWED DATA:    Labs:   Lab Results   Component Value Date     11/25/2019    K 4.3 11/25/2019    CALCIUM 9.7 11/25/2019    AST 12 11/25/2019    ALT 9 11/25/2019    BUN 10 11/25/2019    CREATININE 0.73 (L) 11/25/2019    CREATININE 0.71 (L) 09/16/2019    CREATININE 0.79 10/02/2018    EGFRIFNONA 107  11/25/2019    EGFRIFAFRI 130 11/25/2019       Lab Results   Component Value Date    HGBA1C 6.3 03/16/2020    HGBA1C 6.10 (H) 11/25/2019    HGBA1C 6.50 (H) 09/16/2019    GLUCOSE 142 (H) 08/29/2014       Lab Results   Component Value Date    LDL 26 11/25/2019    LDL 60 01/04/2019     (H) 10/02/2018    HDL 35 (L) 11/25/2019    HDL 40 01/04/2019    TRIG 71 11/25/2019    CHOLHDLRATIO 2.14 11/25/2019       Lab Results   Component Value Date    TSH 2.050 11/25/2019    FREET4 1.32 11/25/2019          Lab Results   Component Value Date    WBC 9.86 11/25/2019    HGB 14.5 11/25/2019    HGB 14.9 01/04/2019    HGB 15.5 10/02/2018     11/25/2019        Lab Results   Component Value Date    PROTEIN See below: (A) 11/25/2019    GLUCOSEU Negative 11/25/2019    BLOODU Negative 11/25/2019    NITRITEU Negative 11/25/2019    LEUKOCYTESUR Trace (A) 11/25/2019       Imaging:          Medical Tests:          Summary of old records / correspondence / consultant report:          Request outside records:          ALLERGIES  Allergies   Allergen Reactions   • Codeine Nausea Only        PFSH:     The following portions of the patient's history were reviewed and updated as appropriate: Allergies / Current Medications / Past Medical History / Surgical History / Social History / Family History    PROBLEM LIST   Patient Active Problem List   Diagnosis   • Primary osteoarthritis of hand   • COPD (chronic obstructive pulmonary disease) (CMS/[facility])   • Cigarette nicotine dependence with nicotine-induced disorder   • Type 2 diabetes mellitus without complication, without long-term current use of insulin (CMS/[facility])   • Hyperlipidemia   • Gastroesophageal reflux disease with esophagitis   • Coronary artery disease involving native coronary artery of native heart without angina pectoris   • Essential hypertension   • Abnormal CT lung screening       PAST MEDICAL HISTORY  Past Medical History:   Diagnosis Date   • Asthma    • COPD (chronic  obstructive pulmonary disease) (CMS/H[facility])    • Pulmonary nodule        SURGICAL HISTORY  Past Surgical History:   Procedure Laterality Date   • COLONOSCOPY N/A 1/22/2018    Diverticulosis in the sigmoid colon, in the descending colon, in the transverse colon and in the ascending colon, non-bleeding internal hemorrhoids   • ENDOSCOPY N/A 1/22/2018    LA Grade C reflux esophagitis, small hiatal hernia, erythematous mucosa in the stomach, duodenal erosions without bleeding   • HAND SURGERY     • THROAT SURGERY  2012    polyp removal   • TONSILLECTOMY     • VASECTOMY         SOCIAL HISTORY  Social History     Socioeconomic History   • Marital status:      Spouse name: Zeenat*   • Number of children: 2   • Years of education: Not on file   • Highest education level: Not on file   Occupational History   • Occupation:  ([employer])   Tobacco Use   • Smoking status: Current Every Day Smoker     Packs/day: 1.00     Years: 45.00     Pack years: 45.00     Types: Cigarettes   • Smokeless tobacco: Never Used   Substance and Sexual Activity   • Alcohol use: Yes     Alcohol/week: 12.0 standard drinks     Types: 12 Cans of beer per week   • Drug use: No   • Sexual activity: Yes     Partners: Female       FAMILY HISTORY  Family History   Problem Relation Age of Onset   • Esophageal cancer Mother 68        smoker   • Asthma Mother    • Heart attack Father 50        smoker   • Asthma Sister         smoker   • Lung disease Brother    • Asthma Maternal Grandmother    • Diabetes Maternal Grandmother    • Heart attack Maternal Grandfather 75   • Colon cancer Neg Hx    • Prostate cancer Neg Hx          **Dragon Disclaimer:   Much of this encounter note is an electronic transcription/translation of spoken language to printed text. The electronic translation of spoken language may permit erroneous, or at times, nonsensical words or phrases to be inadvertently transcribed. Although I have reviewed the note for such  errors, some may still exist.       Template created by Mandy Tyler MD

## 2020-03-16 NOTE — ASSESSMENT & PLAN NOTE
Lab Results   Component Value Date    HGBA1C 6.3 03/16/2020    HGBA1C 6.10 (H) 11/25/2019    HGBA1C 6.50 (H) 09/16/2019      Has been taking metformin  mg 1 BID x 6 weeks.   A1c today is 6.3.  Increase metformin  mg back up to 2 BID.   Follow-up 4 months.

## 2020-03-16 NOTE — PATIENT INSTRUCTIONS
** IMPORTANT MESSAGE FROM DR. BRICEÑO **    In our office, your satisfaction is VERY important to us.     You may receive a survey from [facility] by mail or E-mail for you to provide feedback about your visit. This information is invaluable for me to know what we can do to improve our services.     I ask that you please take a few minutes to complete the survey and let us know how we are doing in serving your needs. (You may receive the survey more than once for multiple visits)    Thank You !    Dr. Briceño & Staff    _________________________________________________________________________________________________________________________      ** ADDITIONAL INSTRUCTION / REMINDERS FROM DR. BRICEÑO **

## 2020-06-11 DIAGNOSIS — E78.00 PURE HYPERCHOLESTEROLEMIA: Chronic | ICD-10-CM

## 2020-06-11 DIAGNOSIS — I10 ESSENTIAL HYPERTENSION: Chronic | ICD-10-CM

## 2020-06-12 RX ORDER — ROSUVASTATIN CALCIUM 10 MG/1
10 TABLET, COATED ORAL NIGHTLY
Qty: 90 TABLET | Refills: 3 | Status: SHIPPED | OUTPATIENT
Start: 2020-06-12 | End: 2021-07-08

## 2020-06-12 RX ORDER — LOSARTAN POTASSIUM 25 MG/1
25 TABLET ORAL DAILY
Qty: 90 TABLET | Refills: 3 | Status: SHIPPED | OUTPATIENT
Start: 2020-06-12 | End: 2021-07-08

## 2020-07-16 ENCOUNTER — OFFICE VISIT (OUTPATIENT)
Dept: INTERNAL MEDICINE | Age: 65
End: 2020-07-16

## 2020-07-16 VITALS
OXYGEN SATURATION: 97 % | SYSTOLIC BLOOD PRESSURE: 138 MMHG | TEMPERATURE: 96.3 F | HEART RATE: 86 BPM | BODY MASS INDEX: 26.6 KG/M2 | DIASTOLIC BLOOD PRESSURE: 70 MMHG | HEIGHT: 71 IN | WEIGHT: 190 LBS

## 2020-07-16 DIAGNOSIS — F17.219 CIGARETTE NICOTINE DEPENDENCE WITH NICOTINE-INDUCED DISORDER: Chronic | ICD-10-CM

## 2020-07-16 DIAGNOSIS — E78.5 HYPERLIPIDEMIA, UNSPECIFIED HYPERLIPIDEMIA TYPE: Chronic | ICD-10-CM

## 2020-07-16 DIAGNOSIS — J44.9 CHRONIC OBSTRUCTIVE PULMONARY DISEASE, UNSPECIFIED COPD TYPE (HCC): Chronic | ICD-10-CM

## 2020-07-16 DIAGNOSIS — E11.9 TYPE 2 DIABETES MELLITUS WITHOUT COMPLICATION, WITHOUT LONG-TERM CURRENT USE OF INSULIN (HCC): Primary | Chronic | ICD-10-CM

## 2020-07-16 DIAGNOSIS — I10 ESSENTIAL HYPERTENSION: Chronic | ICD-10-CM

## 2020-07-16 LAB — HBA1C MFR BLD: 6 %

## 2020-07-16 PROCEDURE — 99214 OFFICE O/P EST MOD 30 MIN: CPT | Performed by: INTERNAL MEDICINE

## 2020-07-16 PROCEDURE — 83036 HEMOGLOBIN GLYCOSYLATED A1C: CPT | Performed by: INTERNAL MEDICINE

## 2020-07-16 NOTE — PROGRESS NOTES
[facility]  SYMONE BRICEÑO M.D.      Mark L Sturgeon / 65 y.o. / male  07/16/2020      CHIEF COMPLAINT     Diabetes; Hypertension; and Hyperlipidemia      ASSESSMENT & PLAN     Problem List Items Addressed This Visit        High    Type 2 diabetes mellitus without complication, without long-term current use of insulin (CMS/[facility]) - Primary (Chronic)    Current Assessment & Plan     Continue metformin  mg two BID.          Relevant Medications    Blood Glucose Monitoring Suppl (BLOOD GLUCOSE MONITOR SYSTEM) w/Device kit    metFORMIN ER (GLUCOPHAGE-XR) 500 MG 24 hr tablet    glucose blood (ONETOUCH VERIO) test strip    Blood Glucose Monitoring Suppl (ONETOUCH VERIO) w/Device kit    Lancets (ONETOUCH ULTRASOFT) lancets    Other Relevant Orders    POC Glycosylated Hemoglobin (Hb A1C)       Medium    Hyperlipidemia (Chronic)    Current Assessment & Plan     Continue rosuvastatin 10 mg daily.          Relevant Medications    rosuvastatin (CRESTOR) 10 MG tablet    Essential hypertension (Chronic)    Current Assessment & Plan     Continue losartan 25 mg qd.          Relevant Medications    losartan (COZAAR) 25 MG tablet       Low    COPD (chronic obstructive pulmonary disease) (CMS/[facility]) (Chronic)    Overview     *Guillaume         Relevant Medications    Tiotropium Bromide-Olodaterol (STIOLTO RESPIMAT IN)    Cigarette nicotine dependence with nicotine-induced disorder (Chronic)    Current Assessment & Plan     Advised to quit smoking. Discussed Wellbutrin and Chantix.          Relevant Medications    NICOTINE MINI 4 MG lozenge    nicotine (NICODERM CQ) 21 MG/24HR patch        Orders Placed This Encounter   Procedures   • POC Glycosylated Hemoglobin (Hb A1C)     No orders of the defined types were placed in this encounter.      Summary/Discussion:  •     Next Appointment with me: Visit date not found    Return in about 6 months (around 1/16/2021) for Diabetes.      MEDICATIONS     Current Outpatient Medications  "  Medication Sig Dispense Refill   • aspirin 81 MG EC tablet Take 1 tablet by mouth Daily.     • Blood Glucose Monitoring Suppl (BLOOD GLUCOSE MONITOR SYSTEM) w/Device kit 1 kit 2 (Two) Times a Day. 1 each 12   • Blood Glucose Monitoring Suppl (ONETOUCH VERIO) w/Device kit 1 kit Daily. 1 kit 0   • Cholecalciferol (VITAMIN D PO) Take 1 tablet by mouth Daily.     • Cyanocobalamin (VITAMIN B-12 PO) Take 1 tablet by mouth Daily.     • glucose blood (ONETOUCH VERIO) test strip Give patient what insurance cover . 100 each 11   • Lancets (ONETOUCH ULTRASOFT) lancets Use to test glucose once daily.  Dx DM2  E11.9 100 each 11   • losartan (COZAAR) 25 MG tablet TAKE 1 TABLET BY MOUTH DAILY 90 tablet 3   • metFORMIN ER (GLUCOPHAGE-XR) 500 MG 24 hr tablet Take 2 tablets by mouth 2 (Two) Times a Day. TAKE 2 TABLET BY MOUTH TWICE DAILY WITH MEALS 120 tablet 3   • Multiple Vitamins-Minerals (MULTIVITAMIN PO) Take  by mouth.     • Omega-3 Fatty Acids (FISH OIL) 1000 MG capsule capsule Take  by mouth.     • rosuvastatin (CRESTOR) 10 MG tablet TAKE 1 TABLET BY MOUTH EVERY NIGHT 90 tablet 3   • Tiotropium Bromide-Olodaterol (STIOLTO RESPIMAT IN) Inhale. 2 puff daily     • vitamin C (ASCORBIC ACID) 500 MG tablet Take 500 mg by mouth Daily.     • nicotine (NICODERM CQ) 21 MG/24HR patch APPLY ONE PATCH DAILY     • NICOTINE MINI 4 MG lozenge 1 LOZENGE Q 1 TO 2 H PER PACK INSTRUCTIONS       No current facility-administered medications for this visit.           VITAL SIGNS     Visit Vitals  /70   Pulse 86   Temp 96.3 °F (35.7 °C)   Ht 180.3 cm (70.98\")   Wt 86.2 kg (190 lb)   SpO2 97%   BMI 26.51 kg/m²       BP Readings from Last 3 Encounters:   07/16/20 138/70   03/16/20 120/62   01/06/20 120/70     Wt Readings from Last 3 Encounters:   07/16/20 86.2 kg (190 lb)   03/16/20 91.6 kg (202 lb)   01/06/20 92.5 kg (204 lb)      Body mass index is 26.51 kg/m².      HISTORY OF PRESENT ILLNESS     Type 2 diabetes previously increased " metformin  mg 2 tablets twice daily.  Blood sugars are stable at home.  Hypertension remains stable on losartan 25 mg.  Hyperlipidemia on rosuvastatin 10 mg without problems.  COPD is managed by his pulmonologist.  He reports to smoking more since being homebound.  He has never tried Chantix or Wellbutrin previously.      Patient Care Team:  Edmundo Tyler MD as PCP - General (Internal Medicine)  Baylee Galaviz MD as Consulting Physician (Pulmonary Disease)      REVIEW OF SYSTEMS     Review of Systems  Wt loss noted, no night sweat or loss of appetite  No chest pain  No worsening shortness of breath   GI neg      PHYSICAL EXAMINATION     Physical Exam  Noted wt loss  Constitutional  No distress  Cardiovascular Rate  normal . Rhythm: regular . Heart sounds:  normal  Pulmonary/Chest  Effort normal. Breath sounds:  Coarse breath sounds without wheezing or rales   Psychiatric  Alert. Judgment and thought content normal. Mood normal     REVIEWED DATA     Labs:     Lab Results   Component Value Date     11/25/2019    K 4.3 11/25/2019    CALCIUM 9.7 11/25/2019    AST 12 11/25/2019    ALT 9 11/25/2019    BUN 10 11/25/2019    CREATININE 0.73 (L) 11/25/2019    CREATININE 0.71 (L) 09/16/2019    CREATININE 0.79 10/02/2018    EGFRIFNONA 107 11/25/2019    EGFRIFAFRI 130 11/25/2019       Lab Results   Component Value Date    HGBA1C 6.3 03/16/2020    HGBA1C 6.10 (H) 11/25/2019    HGBA1C 6.50 (H) 09/16/2019     (H) 11/25/2019    GLU 73 09/16/2019     (H) 10/02/2018       Lab Results   Component Value Date    LDL 26 11/25/2019    LDL 60 01/04/2019     (H) 10/02/2018    HDL 35 (L) 11/25/2019    HDL 40 01/04/2019    HDL 49 10/02/2018    TRIG 71 11/25/2019    TRIG 108 01/04/2019    TRIG 155 (H) 10/02/2018    CHOLHDLRATIO 2.14 11/25/2019    CHOLHDLRATIO 3.05 01/04/2019    CHOLHDLRATIO 3.69 10/02/2018       Lab Results   Component Value Date    TSH 2.050 11/25/2019    FREET4 1.32 11/25/2019       Lab  Results   Component Value Date    WBC 9.86 11/25/2019    HGB 14.5 11/25/2019    HGB 14.9 01/04/2019    HGB 15.5 10/02/2018     11/25/2019       Lab Results   Component Value Date    PROTEIN See below: (A) 11/25/2019    GLUCOSEU Negative 11/25/2019    BLOODU Negative 11/25/2019    NITRITEU Negative 11/25/2019    LEUKOCYTESUR Trace (A) 11/25/2019       Imaging:         Medical Tests:         Summary of old records / correspondence / consultant report:         Request outside records:         ALLERGIES  Allergies   Allergen Reactions   • Codeine Nausea Only        PFSH:     The following portions of the patient's history were reviewed and updated as appropriate: Allergies / Current Medications / Past Medical History / Surgical History / Social History / Family History    PROBLEM LIST   Patient Active Problem List   Diagnosis   • Primary osteoarthritis of hand   • COPD (chronic obstructive pulmonary disease) (CMS/[facility])   • Cigarette nicotine dependence with nicotine-induced disorder   • Type 2 diabetes mellitus without complication, without long-term current use of insulin (CMS/[facility])   • Hyperlipidemia   • Gastroesophageal reflux disease with esophagitis   • Coronary artery disease involving native coronary artery of native heart without angina pectoris   • Essential hypertension   • Abnormal CT lung screening       PAST MEDICAL HISTORY  Past Medical History:   Diagnosis Date   • Asthma    • COPD (chronic obstructive pulmonary disease) (CMS/[facility])    • Pulmonary nodule        SURGICAL HISTORY  Past Surgical History:   Procedure Laterality Date   • COLONOSCOPY N/A 1/22/2018    Diverticulosis in the sigmoid colon, in the descending colon, in the transverse colon and in the ascending colon, non-bleeding internal hemorrhoids   • ENDOSCOPY N/A 1/22/2018    LA Grade C reflux esophagitis, small hiatal hernia, erythematous mucosa in the stomach, duodenal erosions without bleeding   • HAND SURGERY     • THROAT SURGERY  2012     polyp removal   • TONSILLECTOMY     • VASECTOMY         SOCIAL HISTORY  Social History     Socioeconomic History   • Marital status:      Spouse name: Zeenat*   • Number of children: 2   • Years of education: Not on file   • Highest education level: Not on file   Occupational History   • Occupation:  ([employer])   Tobacco Use   • Smoking status: Current Every Day Smoker     Packs/day: 1.00     Years: 45.00     Pack years: 45.00     Types: Cigarettes   • Smokeless tobacco: Never Used   Substance and Sexual Activity   • Alcohol use: Yes     Alcohol/week: 12.0 standard drinks     Types: 12 Cans of beer per week   • Drug use: No   • Sexual activity: Yes     Partners: Female       FAMILY HISTORY  Family History   Problem Relation Age of Onset   • Esophageal cancer Mother 68        smoker   • Asthma Mother    • Heart attack Father 50        smoker   • Asthma Sister         smoker   • Lung disease Brother    • Asthma Maternal Grandmother    • Diabetes Maternal Grandmother    • Heart attack Maternal Grandfather 75   • Colon cancer Neg Hx    • Prostate cancer Neg Hx          **Dragon Disclaimer:   Much of this encounter note is an electronic transcription/translation of spoken language to printed text. The electronic translation of spoken language may permit erroneous, or at times, nonsensical words or phrases to be inadvertently transcribed. Although I have reviewed the note for such errors, some may still exist.     Template created by Mandy Tyler MD

## 2020-09-03 ENCOUNTER — TRANSCRIBE ORDERS (OUTPATIENT)
Dept: ADMINISTRATIVE | Facility: HOSPITAL | Age: 65
End: 2020-09-03

## 2020-09-03 DIAGNOSIS — Z12.2 ENCOUNTER FOR SCREENING FOR LUNG CANCER: Primary | ICD-10-CM

## 2020-09-30 ENCOUNTER — HOSPITAL ENCOUNTER (OUTPATIENT)
Dept: CT IMAGING | Facility: HOSPITAL | Age: 65
Discharge: HOME | End: 2020-09-30
Admitting: INTERNAL MEDICINE

## 2020-09-30 DIAGNOSIS — Z12.2 ENCOUNTER FOR SCREENING FOR LUNG CANCER: ICD-10-CM

## 2020-09-30 PROCEDURE — G0297 LDCT FOR LUNG CA SCREEN: HCPCS

## 2020-12-14 DIAGNOSIS — R73.03 PREDIABETES: Chronic | ICD-10-CM

## 2020-12-14 RX ORDER — METFORMIN HYDROCHLORIDE 500 MG/1
1000 TABLET, EXTENDED RELEASE ORAL 2 TIMES DAILY
Qty: 120 TABLET | Refills: 11 | Status: SHIPPED | OUTPATIENT
Start: 2020-12-14 | End: 2022-02-04

## 2021-01-12 ENCOUNTER — TELEPHONE (OUTPATIENT)
Dept: INTERNAL MEDICINE | Age: 66
End: 2021-01-12

## 2021-01-12 NOTE — TELEPHONE ENCOUNTER
PATIENT CALLED STATING HE HAS AN APPOINTMENT ON 1/19/21. PATIENT WANTS TO KNOW IF HE SHOULD GET LAB WORK PRIOR TO APPOINTMENT.    CONTACT: [phone]

## 2021-01-18 ENCOUNTER — OFFICE VISIT (OUTPATIENT)
Dept: INTERNAL MEDICINE | Age: 66
End: 2021-01-18

## 2021-01-18 VITALS
DIASTOLIC BLOOD PRESSURE: 66 MMHG | HEIGHT: 71 IN | BODY MASS INDEX: 27.58 KG/M2 | TEMPERATURE: 96.9 F | HEART RATE: 69 BPM | WEIGHT: 197 LBS | OXYGEN SATURATION: 95 % | SYSTOLIC BLOOD PRESSURE: 130 MMHG

## 2021-01-18 DIAGNOSIS — Z12.5 ENCOUNTER FOR SCREENING FOR MALIGNANT NEOPLASM OF PROSTATE: ICD-10-CM

## 2021-01-18 DIAGNOSIS — E78.5 HYPERLIPIDEMIA, UNSPECIFIED HYPERLIPIDEMIA TYPE: Chronic | ICD-10-CM

## 2021-01-18 DIAGNOSIS — F17.219 CIGARETTE NICOTINE DEPENDENCE WITH NICOTINE-INDUCED DISORDER: Chronic | ICD-10-CM

## 2021-01-18 DIAGNOSIS — E11.9 TYPE 2 DIABETES MELLITUS WITHOUT COMPLICATION, WITHOUT LONG-TERM CURRENT USE OF INSULIN (HCC): Primary | ICD-10-CM

## 2021-01-18 DIAGNOSIS — Z12.2 ENCOUNTER FOR SCREENING FOR MALIGNANT NEOPLASM OF RESPIRATORY ORGANS: ICD-10-CM

## 2021-01-18 DIAGNOSIS — I25.10 CORONARY ARTERY DISEASE INVOLVING NATIVE CORONARY ARTERY OF NATIVE HEART WITHOUT ANGINA PECTORIS: Chronic | ICD-10-CM

## 2021-01-18 DIAGNOSIS — J44.9 CHRONIC OBSTRUCTIVE PULMONARY DISEASE, UNSPECIFIED COPD TYPE (HCC): Chronic | ICD-10-CM

## 2021-01-18 LAB — HBA1C MFR BLD: 5.9 %

## 2021-01-18 PROCEDURE — 83036 HEMOGLOBIN GLYCOSYLATED A1C: CPT | Performed by: INTERNAL MEDICINE

## 2021-01-18 PROCEDURE — 99214 OFFICE O/P EST MOD 30 MIN: CPT | Performed by: INTERNAL MEDICINE

## 2021-01-18 NOTE — PROGRESS NOTES
I N T E R N A L  M E D I C I N E  J U N O H  K I M,  M D      ENCOUNTER DATE:  01/18/2021    Mark L Sturgeon / 65 y.o. / male      CHIEF COMPLAINT / REASON FOR OFFICE VISIT     Diabetes      ASSESSMENT & PLAN     Problem List Items Addressed This Visit     Type 2 diabetes mellitus without complication, without long-term current use of insulin (CMS/[facility]) - Primary (Chronic)    Current Assessment & Plan     Lab Results   Component Value Date    HGBA1C 5.9 01/18/2021    HGBA1C 6.0 07/16/2020    HGBA1C 6.3 03/16/2020    CREATININE 0.73 (L) 11/25/2019    LDL 26 11/25/2019      Continue metformin  mg 2 BID.          Relevant Medications    Blood Glucose Monitoring Suppl (BLOOD GLUCOSE MONITOR SYSTEM) w/Device kit    glucose blood (ONETOUCH VERIO) test strip    Blood Glucose Monitoring Suppl (ONETOUCH VERIO) w/Device kit    Lancets (ONETOUCH ULTRASOFT) lancets    metFORMIN ER (GLUCOPHAGE-XR) 500 MG 24 hr tablet    Other Relevant Orders    POC Glycosylated Hemoglobin (Hb A1C) (Completed)    Comprehensive Metabolic Panel    Hyperlipidemia (Chronic)    Relevant Medications    rosuvastatin (CRESTOR) 10 MG tablet    Other Relevant Orders    Lipid Panel With / Chol / HDL Ratio    COPD (chronic obstructive pulmonary disease) (CMS/[facility]) (Chronic)    Overview     *Guillaume         Current Assessment & Plan     Continue follow-up with pulmonologist. Advised to quit smoking. Continue inhalers.           Relevant Medications    Tiotropium Bromide-Olodaterol (STIOLTO RESPIMAT IN)    Cigarette nicotine dependence with nicotine-induced disorder (Chronic)    Current Assessment & Plan     Advised to quit smoking.          Relevant Medications    NICOTINE MINI 4 MG lozenge    nicotine (NICODERM CQ) 21 MG/24HR patch    Coronary artery disease involving native coronary artery of native heart without angina pectoris (Chronic)    Overview     Noted on heart cath in 2014    Continue ASA 81 mg qd, statin therapy.            Other Visit  "Diagnoses     Encounter for screening for malignant neoplasm of respiratory organs        Encounter for screening for malignant neoplasm of prostate        Relevant Orders    PSA Screen        Orders Placed This Encounter   Procedures   • Comprehensive Metabolic Panel   • Lipid Panel With / Chol / HDL Ratio   • PSA Screen   • POC Glycosylated Hemoglobin (Hb A1C)     No orders of the defined types were placed in this encounter.      SUMMARY/DISCUSSION  •       Next Appointment with me: 5/19/2021    Return for Reassess chronic medical problems, WELCOME TO MEDICARE VISIT (30 MIN).      VITAL SIGNS     Visit Vitals  /66   Pulse 69   Temp 96.9 °F (36.1 °C)   Ht 180.3 cm (70.98\")   Wt 89.4 kg (197 lb)   SpO2 95%   BMI 27.49 kg/m²       BP Readings from Last 3 Encounters:   01/18/21 130/66   07/16/20 138/70   03/16/20 120/62     Wt Readings from Last 3 Encounters:   01/18/21 89.4 kg (197 lb)   07/16/20 86.2 kg (190 lb)   03/16/20 91.6 kg (202 lb)     Body mass index is 27.49 kg/m².        HISTORY OF PRESENT ILLNESS     Diabetes stable with A1c of 5.9 today on metformin  mg 2 tablets twice daily.  Denies increased cough or shortness of breath followed by pulmonologist for COPD and abnormal CT.  He has decreased smoking overall but still smokes heavily.  Has coronary artery disease without chest pain.        REVIEW OF SYSTEMS           PHYSICAL EXAMINATION     Physical Exam  Overweight, no acute distress  Cardiovascular: Normal rate, regular rhythm.   Coarse breath sounds bilaterally with scant wheezing, no rales       REVIEWED DATA     Labs:     Lab Results   Component Value Date     11/25/2019    K 4.3 11/25/2019    AST 12 11/25/2019    ALT 9 11/25/2019    BUN 10 11/25/2019    CREATININE 0.73 (L) 11/25/2019    CREATININE 0.71 (L) 09/16/2019    CREATININE 0.79 10/02/2018    EGFRIFNONA 107 11/25/2019    EGFRIFAFRI 130 11/25/2019       Lab Results   Component Value Date    HGBA1C 5.9 01/18/2021    HGBA1C 6.0 " 07/16/2020    HGBA1C 6.3 03/16/2020       Lab Results   Component Value Date    LDL 26 11/25/2019    LDL 60 01/04/2019    HDL 35 (L) 11/25/2019    TRIG 71 11/25/2019       Lab Results   Component Value Date    TSH 2.050 11/25/2019    FREET4 1.32 11/25/2019       Lab Results   Component Value Date    WBC 9.86 11/25/2019    HGB 14.5 11/25/2019     11/25/2019         Imaging:     LOW-DOSE CHEST CT WITHOUT IV CONTRAST     HISTORY: Lung cancer screening     TECHNIQUE: Radiation dose reduction techniques were utilized, including  automated exposure control and exposure modulation based on body size.  Low-dose chest CT protocol with 2 mm intervals. Compared with prior CT  from 06/03/2019     FINDINGS: Dependent secretions are seen within the trachea. Mild  bronchial wall thickening is demonstrated bilaterally with areas of  mucous plugging particularly within bilateral lower lobes. The  previously demonstrated small area of focal bronchiectasis within the  right upper lobe is less prominent today. A right perifissural lung  nodule is unchanged. No new pulmonary nodules are seen. No pleural or  pericardial effusion. No pathological mediastinal lymphadenopathy is  seen. Calcified coronary artery disease is present.     The visualized upper abdomen is unremarkable. No pathological axillary  lymphadenopathy.     IMPRESSION:  1.ACR lung RADS category 2. Benign appearance or behavior. Follow-up  with annual low-dose CT is recommended in 12 months.  2. CTDI 1.58 mGy. DLP 58.3 mGycm.  3. CT findings of bronchitis.      Medical Tests:           Summary of old records / correspondence / consultant report:           Request outside records:           MEDICATIONS AT THE TIME OF OFFICE VISIT     Current Outpatient Medications   Medication Sig Dispense Refill   • aspirin 81 MG EC tablet Take 1 tablet by mouth Daily.     • Blood Glucose Monitoring Suppl (BLOOD GLUCOSE MONITOR SYSTEM) w/Device kit 1 kit 2 (Two) Times a Day. 1 each  12   • Blood Glucose Monitoring Suppl (ONETOUCH VERIO) w/Device kit 1 kit Daily. 1 kit 0   • Cholecalciferol (VITAMIN D PO) Take 1 tablet by mouth Daily.     • Cyanocobalamin (VITAMIN B-12 PO) Take 1 tablet by mouth Daily.     • glucose blood (ONETOUCH VERIO) test strip Give patient what insurance cover . 100 each 11   • Lancets (ONETOUCH ULTRASOFT) lancets Use to test glucose once daily.  Dx DM2  E11.9 100 each 11   • losartan (COZAAR) 25 MG tablet TAKE 1 TABLET BY MOUTH DAILY 90 tablet 3   • metFORMIN ER (GLUCOPHAGE-XR) 500 MG 24 hr tablet Take 2 tablets by mouth 2 (Two) Times a Day. TAKE 2 TABLET BY MOUTH TWICE DAILY WITH MEALS 120 tablet 11   • Multiple Vitamins-Minerals (MULTIVITAMIN PO) Take  by mouth.     • nicotine (NICODERM CQ) 21 MG/24HR patch Prn     • NICOTINE MINI 4 MG lozenge 1 LOZENGE Q 1 TO 2 H PER PACK INSTRUCTIONS     • Omega-3 Fatty Acids (FISH OIL) 1000 MG capsule capsule Take  by mouth.     • rosuvastatin (CRESTOR) 10 MG tablet TAKE 1 TABLET BY MOUTH EVERY NIGHT 90 tablet 3   • Tiotropium Bromide-Olodaterol (STIOLTO RESPIMAT IN) Inhale. 2 puff daily     • vitamin C (ASCORBIC ACID) 500 MG tablet Take 500 mg by mouth Daily.       No current facility-administered medications for this visit.            *Examiner was wearing KN95 mask, face shield and exam gloves during the entire duration of the visit. Patient was masked the entire time.   Minimum social distance of 6 ft maintained entire visit except if physical contact was necessary as documented.     **Dragon Disclaimer:   Much of this encounter note is an electronic transcription/translation of spoken language to printed text. The electronic translation of spoken language may permit erroneous, or at times, nonsensical words or phrases to be inadvertently transcribed. Although I have reviewed the note for such errors, some may still exist.     Template created by Mandy Tyler MD

## 2021-01-18 NOTE — ASSESSMENT & PLAN NOTE
Lab Results   Component Value Date    HGBA1C 5.9 01/18/2021    HGBA1C 6.0 07/16/2020    HGBA1C 6.3 03/16/2020    CREATININE 0.73 (L) 11/25/2019    LDL 26 11/25/2019      Continue metformin  mg 2 BID.

## 2021-01-19 LAB
ALBUMIN SERPL-MCNC: 4.5 G/DL (ref 3.8–4.8)
ALBUMIN/GLOB SERPL: 1.7 {RATIO} (ref 1.2–2.2)
ALP SERPL-CCNC: 63 IU/L (ref 39–117)
ALT SERPL-CCNC: 13 IU/L (ref 0–44)
AST SERPL-CCNC: 15 IU/L (ref 0–40)
BILIRUB SERPL-MCNC: 0.3 MG/DL (ref 0–1.2)
BUN SERPL-MCNC: 14 MG/DL (ref 8–27)
BUN/CREAT SERPL: 19 (ref 10–24)
CALCIUM SERPL-MCNC: 10.2 MG/DL (ref 8.6–10.2)
CHLORIDE SERPL-SCNC: 102 MMOL/L (ref 96–106)
CHOLEST SERPL-MCNC: 102 MG/DL (ref 100–199)
CHOLEST/HDLC SERPL: 2 RATIO (ref 0–5)
CO2 SERPL-SCNC: 25 MMOL/L (ref 20–29)
CREAT SERPL-MCNC: 0.74 MG/DL (ref 0.76–1.27)
GLOBULIN SER CALC-MCNC: 2.6 G/DL (ref 1.5–4.5)
GLUCOSE SERPL-MCNC: 79 MG/DL (ref 65–99)
HDLC SERPL-MCNC: 52 MG/DL
LDLC SERPL CALC-MCNC: 34 MG/DL (ref 0–99)
POTASSIUM SERPL-SCNC: 4.5 MMOL/L (ref 3.5–5.2)
PROT SERPL-MCNC: 7.1 G/DL (ref 6–8.5)
PSA SERPL-MCNC: 1 NG/ML (ref 0–4)
SODIUM SERPL-SCNC: 140 MMOL/L (ref 134–144)
TRIGL SERPL-MCNC: 76 MG/DL (ref 0–149)
VLDLC SERPL CALC-MCNC: 16 MG/DL (ref 5–40)

## 2021-03-03 ENCOUNTER — IMMUNIZATION (OUTPATIENT)
Dept: VACCINE CLINIC | Facility: HOSPITAL | Age: 66
End: 2021-03-03

## 2021-03-03 PROCEDURE — 91300 HC SARSCOV02 VAC 30MCG/0.3ML IM: CPT | Performed by: INTERNAL MEDICINE

## 2021-03-03 PROCEDURE — 0001A: CPT | Performed by: INTERNAL MEDICINE

## 2021-03-24 ENCOUNTER — IMMUNIZATION (OUTPATIENT)
Dept: VACCINE CLINIC | Facility: HOSPITAL | Age: 66
End: 2021-03-24

## 2021-03-24 PROCEDURE — 91300 HC SARSCOV02 VAC 30MCG/0.3ML IM: CPT | Performed by: INTERNAL MEDICINE

## 2021-03-24 PROCEDURE — 0002A: CPT | Performed by: INTERNAL MEDICINE

## 2021-07-08 DIAGNOSIS — I10 ESSENTIAL HYPERTENSION: Chronic | ICD-10-CM

## 2021-07-08 DIAGNOSIS — E78.00 PURE HYPERCHOLESTEROLEMIA: Chronic | ICD-10-CM

## 2021-07-08 RX ORDER — ROSUVASTATIN CALCIUM 10 MG/1
10 TABLET, COATED ORAL NIGHTLY
Qty: 90 TABLET | Refills: 0 | Status: SHIPPED | OUTPATIENT
Start: 2021-07-08 | End: 2021-10-08

## 2021-07-08 RX ORDER — LOSARTAN POTASSIUM 25 MG/1
25 TABLET ORAL DAILY
Qty: 90 TABLET | Refills: 0 | Status: SHIPPED | OUTPATIENT
Start: 2021-07-08 | End: 2021-10-08

## 2021-08-26 ENCOUNTER — OFFICE VISIT (OUTPATIENT)
Dept: INTERNAL MEDICINE | Age: 66
End: 2021-08-26

## 2021-08-26 VITALS
HEIGHT: 71 IN | BODY MASS INDEX: 25.2 KG/M2 | WEIGHT: 180 LBS | DIASTOLIC BLOOD PRESSURE: 78 MMHG | SYSTOLIC BLOOD PRESSURE: 128 MMHG | HEART RATE: 75 BPM | OXYGEN SATURATION: 97 % | TEMPERATURE: 97.5 F

## 2021-08-26 DIAGNOSIS — R63.4 ABNORMAL WEIGHT LOSS: Primary | ICD-10-CM

## 2021-08-26 LAB
ALBUMIN SERPL-MCNC: 4.8 G/DL (ref 3.5–5.2)
ALBUMIN/GLOB SERPL: 1.8 G/DL
ALP SERPL-CCNC: 63 U/L (ref 39–117)
ALT SERPL-CCNC: 11 U/L (ref 1–41)
APPEARANCE UR: CLEAR
AST SERPL-CCNC: 17 U/L (ref 1–40)
BACTERIA #/AREA URNS HPF: NORMAL /HPF
BASOPHILS # BLD AUTO: 0.07 10*3/MM3 (ref 0–0.2)
BASOPHILS NFR BLD AUTO: 0.7 % (ref 0–1.5)
BILIRUB SERPL-MCNC: 0.4 MG/DL (ref 0–1.2)
BILIRUB UR QL STRIP: NEGATIVE
BUN SERPL-MCNC: 10 MG/DL (ref 8–23)
BUN/CREAT SERPL: 13.5 (ref 7–25)
CALCIUM SERPL-MCNC: 9.9 MG/DL (ref 8.6–10.5)
CHLORIDE SERPL-SCNC: 101 MMOL/L (ref 98–107)
CO2 SERPL-SCNC: 26.1 MMOL/L (ref 22–29)
COLOR UR: YELLOW
CREAT SERPL-MCNC: 0.74 MG/DL (ref 0.76–1.27)
EOSINOPHIL # BLD AUTO: 0.36 10*3/MM3 (ref 0–0.4)
EOSINOPHIL NFR BLD AUTO: 3.6 % (ref 0.3–6.2)
EPI CELLS #/AREA URNS HPF: NORMAL /HPF
ERYTHROCYTE [DISTWIDTH] IN BLOOD BY AUTOMATED COUNT: 12 % (ref 12.3–15.4)
GLOBULIN SER CALC-MCNC: 2.6 GM/DL
GLUCOSE SERPL-MCNC: 79 MG/DL (ref 65–99)
GLUCOSE UR QL: NEGATIVE
HCT VFR BLD AUTO: 46.5 % (ref 37.5–51)
HGB BLD-MCNC: 15 G/DL (ref 13–17.7)
HGB UR QL STRIP: NEGATIVE
IMM GRANULOCYTES # BLD AUTO: 0.02 10*3/MM3 (ref 0–0.05)
IMM GRANULOCYTES NFR BLD AUTO: 0.2 % (ref 0–0.5)
KETONES UR QL STRIP: ABNORMAL
LEUKOCYTE ESTERASE UR QL STRIP: ABNORMAL
LYMPHOCYTES # BLD AUTO: 2.46 10*3/MM3 (ref 0.7–3.1)
LYMPHOCYTES NFR BLD AUTO: 24.6 % (ref 19.6–45.3)
MCH RBC QN AUTO: 31.3 PG (ref 26.6–33)
MCHC RBC AUTO-ENTMCNC: 32.3 G/DL (ref 31.5–35.7)
MCV RBC AUTO: 96.9 FL (ref 79–97)
MONOCYTES # BLD AUTO: 0.96 10*3/MM3 (ref 0.1–0.9)
MONOCYTES NFR BLD AUTO: 9.6 % (ref 5–12)
NEUTROPHILS # BLD AUTO: 6.12 10*3/MM3 (ref 1.7–7)
NEUTROPHILS NFR BLD AUTO: 61.3 % (ref 42.7–76)
NITRITE UR QL STRIP: NEGATIVE
NRBC BLD AUTO-RTO: 0 /100 WBC (ref 0–0.2)
PH UR STRIP: 6 [PH] (ref 5–8)
PLATELET # BLD AUTO: 265 10*3/MM3 (ref 140–450)
POTASSIUM SERPL-SCNC: 4.6 MMOL/L (ref 3.5–5.2)
PROT SERPL-MCNC: 7.4 G/DL (ref 6–8.5)
PROT UR QL STRIP: NEGATIVE
RBC # BLD AUTO: 4.8 10*6/MM3 (ref 4.14–5.8)
RBC #/AREA URNS HPF: NORMAL /HPF
SODIUM SERPL-SCNC: 138 MMOL/L (ref 136–145)
SP GR UR: 1.02 (ref 1–1.03)
T4 FREE SERPL-MCNC: 1.23 NG/DL (ref 0.93–1.7)
TSH SERPL DL<=0.005 MIU/L-ACNC: 2.59 UIU/ML (ref 0.27–4.2)
UROBILINOGEN UR STRIP-MCNC: ABNORMAL MG/DL
WBC # BLD AUTO: 9.99 10*3/MM3 (ref 3.4–10.8)
WBC #/AREA URNS HPF: NORMAL /HPF

## 2021-08-26 PROCEDURE — G0402 INITIAL PREVENTIVE EXAM: HCPCS | Performed by: INTERNAL MEDICINE

## 2021-08-26 PROCEDURE — 99214 OFFICE O/P EST MOD 30 MIN: CPT | Performed by: INTERNAL MEDICINE

## 2021-08-26 NOTE — PROGRESS NOTES
I N T E R N A L  M E D I C I N E  J U N O H  K I M,  M D      ENCOUNTER DATE:  08/26/2021    Mark L Sturgeon / 66 y.o. / male        WELCOME TO MEDICARE     Chief Complaint:   Chief Complaint   Patient presents with   • Welcome To Medicare        Patient's general assessment of his health since a year ago:     - Compared to one year ago, he feels his physical health is moderately worse.    - Compared to one year ago, he feels his mental health is about the same without significant change.      HPI for other active medical problems:     Ongoing significant wt loss of > 40 lbs since 2018 and accelerating since last visit. Lost 17 lbs since 1/2021. Still smoking 3/4 pack per day. Followed by pulmonologist for abnormal chest CT with cavitary lesion/nodules. Current on colonoscopy and PSA. History of GERD with esophagitis, last EGD 2018. Chronic hoarseness, had polyps removed from vocal cord around 2012. Denies throat pain, dysphagia or worsening hoarseness. He does report decreased appetite, night sweat over last 1 year. No depression.         HISTORY       Patient Care Team:    Patient Care Team:  Edmundo Tyler MD as PCP - General (Internal Medicine)  Baylee Galaviz MD as Consulting Physician (Pulmonary Disease)      Allergies:    Codeine    Medications:    Outpatient Medications Prior to Visit   Medication Sig Dispense Refill   • aspirin 81 MG EC tablet Take 1 tablet by mouth Daily.     • Blood Glucose Monitoring Suppl (BLOOD GLUCOSE MONITOR SYSTEM) w/Device kit 1 kit 2 (Two) Times a Day. 1 each 12   • Blood Glucose Monitoring Suppl (ONETOUCH VERIO) w/Device kit 1 kit Daily. 1 kit 0   • Cholecalciferol (VITAMIN D PO) Take 1 tablet by mouth Daily.     • Cyanocobalamin (VITAMIN B-12 PO) Take 1 tablet by mouth Daily.     • glucose blood (ONETOUCH VERIO) test strip Give patient what insurance cover . 100 each 11   • Lancets (ONETOUCH ULTRASOFT) lancets Use to test glucose once daily.  Dx DM2  E11.9 100 each 11   •  losartan (COZAAR) 25 MG tablet TAKE 1 TABLET BY MOUTH DAILY 90 tablet 0   • metFORMIN ER (GLUCOPHAGE-XR) 500 MG 24 hr tablet Take 2 tablets by mouth 2 (Two) Times a Day. TAKE 2 TABLET BY MOUTH TWICE DAILY WITH MEALS 120 tablet 11   • Multiple Vitamins-Minerals (MULTIVITAMIN PO) Take  by mouth.     • rosuvastatin (CRESTOR) 10 MG tablet TAKE 1 TABLET BY MOUTH EVERY NIGHT 90 tablet 0   • Tiotropium Bromide-Olodaterol (STIOLTO RESPIMAT IN) Inhale. 2 puff daily     • vitamin C (ASCORBIC ACID) 500 MG tablet Take 500 mg by mouth Daily.     • nicotine (NICODERM CQ) 21 MG/24HR patch Prn     • NICOTINE MINI 4 MG lozenge 1 LOZENGE Q 1 TO 2 H PER PACK INSTRUCTIONS     • Omega-3 Fatty Acids (FISH OIL) 1000 MG capsule capsule Take  by mouth.       No facility-administered medications prior to visit.       PFSH:     The following portions of the patient's history were reviewed and updated as appropriate: Allergies / Current Medications / Past Medical History / Surgical History / Social History / Family History    Problem List:    Patient Active Problem List   Diagnosis   • Primary osteoarthritis of hand   • COPD (chronic obstructive pulmonary disease) (CMS/HCC)   • Cigarette nicotine dependence with nicotine-induced disorder   • Type 2 diabetes mellitus without complication, without long-term current use of insulin (CMS/HCC)   • Hyperlipidemia   • Gastroesophageal reflux disease with esophagitis   • Coronary artery disease involving native coronary artery of native heart without angina pectoris   • Essential hypertension   • Abnormal CT lung screening   • Abnormal weight loss       Past Medical History:    Past Medical History:   Diagnosis Date   • Asthma    • COPD (chronic obstructive pulmonary disease) (CMS/HCC)    • Pulmonary nodule        Past Surgical History:    Past Surgical History:   Procedure Laterality Date   • COLONOSCOPY N/A 1/22/2018    Diverticulosis in the sigmoid colon, in the descending colon, in the transverse  "colon and in the ascending colon, non-bleeding internal hemorrhoids   • ENDOSCOPY N/A 1/22/2018    LA Grade C reflux esophagitis, small hiatal hernia, erythematous mucosa in the stomach, duodenal erosions without bleeding   • HAND SURGERY     • THROAT SURGERY  2012    polyp removal   • TONSILLECTOMY     • VASECTOMY         Social History:    Social History     Socioeconomic History   • Marital status:      Spouse name: Zeenat*   • Number of children: 2   • Years of education: Not on file   • Highest education level: Not on file   Tobacco Use   • Smoking status: Current Every Day Smoker     Packs/day: 1.00     Years: 45.00     Pack years: 45.00     Types: Cigarettes   • Smokeless tobacco: Never Used   Substance and Sexual Activity   • Alcohol use: Yes     Comment: 4 days/week (3/d)   • Drug use: No   • Sexual activity: Yes     Partners: Female       Family History:    Family History   Problem Relation Age of Onset   • Esophageal cancer Mother 68        smoker   • Asthma Mother    • Heart attack Father 50        smoker   • Asthma Sister         smoker   • Lung disease Brother    • Asthma Maternal Grandmother    • Diabetes Maternal Grandmother    • Heart attack Maternal Grandfather 75   • Colon cancer Neg Hx    • Prostate cancer Neg Hx            PATIENT ASSESSMENT       Vitals:  /78 (BP Location: Left arm)   Pulse 75   Temp 97.5 °F (36.4 °C)   Ht 180.3 cm (70.98\")   Wt 81.6 kg (180 lb)   SpO2 97%   BMI 25.12 kg/m²   BP Readings from Last 3 Encounters:   08/26/21 128/78   01/18/21 130/66   07/16/20 138/70     Wt Readings from Last 3 Encounters:   08/26/21 81.6 kg (180 lb)   01/18/21 89.4 kg (197 lb)   07/16/20 86.2 kg (190 lb)      Body mass index is 25.12 kg/m².    There were no vitals filed for this visit.      Review of Systems:    Review of Systems   Constitutional: Positive for appetite change and unexpected weight change. Negative for fever. Diaphoresis: night sweat x 1 year.   HENT:        " Chronic hoarseness    Respiratory: Positive for shortness of breath. Cough: denies worsening cough or hemoptysis.    Cardiovascular: Negative for chest pain and palpitations.   Gastrointestinal: Negative for abdominal distention, abdominal pain, blood in stool, nausea and vomiting.   Endocrine: Negative for cold intolerance, heat intolerance, polydipsia and polyuria.   Genitourinary: Negative.    Musculoskeletal: Negative.    Skin: Negative.    Neurological: Negative.    Hematological: Negative for adenopathy. Does not bruise/bleed easily.   Psychiatric/Behavioral: Negative.  Negative for dysphoric mood.       Physical Examination:    Visual acuity is documented on today's rooming information.     Physical Exam  Constitutional:       General: He is not in acute distress.     Appearance: Normal appearance. He is not ill-appearing or toxic-appearing.      Comments: Notable weight loss    HENT:      Head: Atraumatic.      Right Ear: Tympanic membrane normal.      Left Ear: Tympanic membrane normal.   Eyes:      General: No scleral icterus.     Pupils: Pupils are equal, round, and reactive to light.   Neck:      Thyroid: No thyroid mass, thyromegaly or thyroid tenderness.      Vascular: No carotid bruit or JVD.      Trachea: Trachea normal. No tracheal deviation.   Cardiovascular:      Rate and Rhythm: Normal rate and regular rhythm.      Heart sounds: Normal heart sounds.   Pulmonary:      Effort: Pulmonary effort is normal.      Breath sounds: Normal breath sounds.   Abdominal:      Palpations: Abdomen is soft. There is no hepatomegaly, mass or pulsatile mass.      Tenderness: There is no abdominal tenderness.   Musculoskeletal:      Right lower leg: No edema.      Left lower leg: No edema.   Lymphadenopathy:      Cervical: No cervical adenopathy.   Skin:     General: Skin is warm.      Coloration: Skin is not jaundiced or pale.      Nails: There is no clubbing.   Neurological:      Mental Status: He is alert.    Psychiatric:         Mood and Affect: Mood normal.         Speech: Speech normal.         Behavior: Behavior normal.         Thought Content: Thought content normal.         Judgment: Judgment normal.           Recent Lab Results:    Lab Results   Component Value Date     01/18/2021    K 4.5 01/18/2021    CALCIUM 10.2 01/18/2021    AST 15 01/18/2021    ALT 13 01/18/2021    BUN 14 01/18/2021    CREATININE 0.74 (L) 01/18/2021    CREATININE 0.73 (L) 11/25/2019    CREATININE 0.71 (L) 09/16/2019    EGFRIFNONA 95 01/18/2021    EGFRIFAFRI 110 01/18/2021       Lab Results   Component Value Date    GLU 79 01/18/2021     (H) 11/25/2019    GLU 73 09/16/2019    HGBA1C 5.9 01/18/2021    HGBA1C 6.0 07/16/2020    HGBA1C 6.3 03/16/2020       Lab Results   Component Value Date    LDL 34 01/18/2021    LDL 26 11/25/2019    LDL 60 01/04/2019    HDL 52 01/18/2021    TRIG 76 01/18/2021    CHOLHDLRATIO 2.0 01/18/2021       Lab Results   Component Value Date    TSH 2.050 11/25/2019    FREET4 1.32 11/25/2019          Lab Results   Component Value Date    WBC 9.86 11/25/2019    HGB 14.5 11/25/2019    HGB 14.9 01/04/2019    HGB 15.5 10/02/2018     11/25/2019        EKG:      AAA screening:  Triple vessel screening recommended.     Screening for Glaucoma:  Previous screening for glaucoma?: Yes    Hearing Loss Screen:  Finger Rub Hearing Test (right ear): passed  Finger Rub Hearing Test (left ear): passed    Visual Acuity Screen:  Recorded on today's rooming information.     Urinary Incontinence Screen:  Episodes of urinary incontinence? : No    Depression Screen:  PHQ-2/PHQ-9 Depression Screening 8/26/2021   Little interest or pleasure in doing things 0   Feeling down, depressed, or hopeless 0   Total Score 0        PHQ-2: 0 (Not depressed)    PHQ-9: 0 (Negative screening for depression)       FUNCTIONAL, FALL RISK, & COGNITIVE SCREENING (Components below):    DATA:    Functional & Cognitive Status 8/26/2021   Do you have  difficulty preparing food and eating? No   Do you have difficulty bathing yourself, getting dressed or grooming yourself? No   Do you have difficulty using the toilet? No   Do you have difficulty moving around from place to place? No   Do you have trouble with steps or getting out of a bed or a chair? No   Current Diet Low Carb Diet   Dental Exam Up to date   Eye Exam Up to date   Exercise (times per week) 1 times per week   Current Exercises Include Other        Exercise Comment golfing   Do you need help using the phone?  No   Are you deaf or do you have serious difficulty hearing?  No   Do you need help with transportation? No   Do you need help shopping? No   Do you need help preparing meals?  No   Do you need help with housework?  No   Do you need help with laundry? No   Do you need help taking your medications? No   Do you need help managing money? No   Do you ever drive or ride in a car without wearing a seat belt? No   Do you have difficulty concentrating, remembering or making decisions? No       A) Assessment of Functional Ability:  (Assessment of ability to perform ADL's (showering/bathing, using toilet, dressing, feeding self, moving self around) and IADL's (use telephone, shop, prepare food, housekeep, do laundry, transport independently, take medications independently, and handle finances)    Degree of functional impairment: NONE (based on assessment noted above)      B) Assessment of Fall Risk:     Fall Risk Assessment was completed, and patient is at moderate risk for falls.    Need for further evaluation of gait, strength, and balance? : No    Timed Up and Go (TUG): JK SL TIME SECONDS: 6 seconds  (>= 12 seconds indicates high risk for falling)    Observable abnormalities included: Normal gait pattern         COUNSELING       A. Identification of Health Risk Factors:    Risk factors include: cardiovascular risk factors, inadequate nutrition and tobacco use      B. Age-Appropriate Screening  Schedule:  (Refer to the list below for future screening recommendations based on patient's age, sex and/or medical conditions. Orders for these recommended tests are listed in the plan section. The patient has been provided with a written plan)    Health Maintenance Topics  Health Maintenance   Topic Date Due   • URINE MICROALBUMIN  Never done   • DIABETIC FOOT EXAM  Never done   • HEMOGLOBIN A1C  07/18/2021   • INFLUENZA VACCINE  10/01/2021   • PROSTATE CANCER SCREENING  01/18/2022   • LIPID PANEL  01/18/2022   • DIABETIC EYE EXAM  07/02/2022   • TDAP/TD VACCINES (3 - Td or Tdap) 05/29/2023   • ZOSTER VACCINE  Completed       Health Maintenance Topics Due or Over-Due  Health Maintenance Due   Topic Date Due   • URINE MICROALBUMIN  Never done   • DIABETIC FOOT EXAM  Never done   • AAA SCREEN (ONE-TIME)  Never done   • HEMOGLOBIN A1C  07/18/2021         C. Advanced Care Planning:    Advance Care Planning   ACP discussion was held with the patient during this visit. Patient does not have an advance directive, information provided.       D. Patient Self-Management and Personalized Health Advice:    He has been provided with personalized counseling/information (including brochures/handouts) about:     -- optimizing diet/nutrition plans, weight management, tobacco cessation, reducing risk for cardiovascular disease (heart, stroke, vascular), designing advance directives and designating POA    He has been recommended for the following preventative services which has been performed today, will be ordered today or ordered/performed on upcoming follow-up visit:     -- smoking cessation counseling completed, nutrition counseling provided, counseling for cardiovascular disease risk reduction, fall risk assessment / plan of care completed, urinary incontinence assessment done, screening for diabetes performed (current/reviewed labs/lab ordered), triple vessel screening recommended (including AAA screening), **vaccination  recommendations provided for COVID-19      E. Miscellaneous Items:    -Aspirin use counseling: Taking ASA appropriately as indicated    -Discussed BMI with him. The BMI is in the acceptable range    -Reviewed use of high risk medication in the elderly: YES    -Reviewed for potential of harmful drug interactions in the elderly: YES        WRAP UP     Assessment & Plan:    1) WELCOME TO MEDICARE VISIT    2) OTHER MEDICAL CONDITIONS ADDRESSED TODAY:              Problem List Items Addressed This Visit        High    Abnormal weight loss - Primary    Current Assessment & Plan     > 40 lbs of weight loss since 2018 with accelerated wt loss since last visit that is clearly unintended. Has decreased appetite and night sweat over 1 year. Concerning for possible occult malignancy but may also be due to emphysema.     · Check labs   · Check CT of chest and abdomen  · Consider EGD, CT neck   · Current on colonoscopy and PSA.   · Advised to supplement meals twice daily with Boost/Ensure  · Follow-up 3 months          Relevant Orders    Comprehensive Metabolic Panel    TSH+Free T4    CBC & Differential    Urinalysis With Microscopic If Indicated (No Culture) - Urine, Clean Catch    CT Abdomen Pelvis With Contrast    CT Chest Without Contrast Diagnostic                    Orders Placed This Encounter   Procedures   • CT Abdomen Pelvis With Contrast   • CT Chest Without Contrast Diagnostic   • Comprehensive Metabolic Panel   • TSH+Free T4   • Urinalysis With Microscopic If Indicated (No Culture) - Urine, Clean Catch   • CBC & Differential       Discussion / Summary:         Medications as of TODAY:              Current Outpatient Medications   Medication Sig Dispense Refill   • aspirin 81 MG EC tablet Take 1 tablet by mouth Daily.     • Blood Glucose Monitoring Suppl (BLOOD GLUCOSE MONITOR SYSTEM) w/Device kit 1 kit 2 (Two) Times a Day. 1 each 12   • Blood Glucose Monitoring Suppl (ONETOUCH VERIO) w/Device kit 1 kit Daily. 1 kit 0    • Cholecalciferol (VITAMIN D PO) Take 1 tablet by mouth Daily.     • Cyanocobalamin (VITAMIN B-12 PO) Take 1 tablet by mouth Daily.     • glucose blood (ONETOUCH VERIO) test strip Give patient what insurance cover . 100 each 11   • Lancets (ONETOUCH ULTRASOFT) lancets Use to test glucose once daily.  Dx DM2  E11.9 100 each 11   • losartan (COZAAR) 25 MG tablet TAKE 1 TABLET BY MOUTH DAILY 90 tablet 0   • metFORMIN ER (GLUCOPHAGE-XR) 500 MG 24 hr tablet Take 2 tablets by mouth 2 (Two) Times a Day. TAKE 2 TABLET BY MOUTH TWICE DAILY WITH MEALS 120 tablet 11   • Multiple Vitamins-Minerals (MULTIVITAMIN PO) Take  by mouth.     • rosuvastatin (CRESTOR) 10 MG tablet TAKE 1 TABLET BY MOUTH EVERY NIGHT 90 tablet 0   • Tiotropium Bromide-Olodaterol (STIOLTO RESPIMAT IN) Inhale. 2 puff daily     • vitamin C (ASCORBIC ACID) 500 MG tablet Take 500 mg by mouth Daily.     • nicotine (NICODERM CQ) 21 MG/24HR patch Prn     • NICOTINE MINI 4 MG lozenge 1 LOZENGE Q 1 TO 2 H PER PACK INSTRUCTIONS     • Omega-3 Fatty Acids (FISH OIL) 1000 MG capsule capsule Take  by mouth.       No current facility-administered medications for this visit.         FOLLOW-UP:            Return in about 3 months (around 11/26/2021) for abnormal weight loss.              Future Appointments   Date Time Provider Department Center   11/29/2021  2:30 PM Edmundo Tyler MD [facility]       ______________________________________________________________________      Examiner was wearing [id number] mask, face shield and exam gloves during the entire duration of the visit. Patient was masked the entire time.   Minimum social distance of 6 ft maintained entire visit except if physical contact was necessary as documented.      **Dragon Disclaimer:   Much of this encounter note is an electronic transcription/translation of spoken language to printed text. The electronic translation of spoken language may permit erroneous, or at times, nonsensical words or phrases to  be inadvertently transcribed. Although I have reviewed the note for such errors, some may still exist.       This template was created by Mandy Tyler MD.

## 2021-08-26 NOTE — ASSESSMENT & PLAN NOTE
> 40 lbs of weight loss since 2018 with accelerated wt loss since last visit that is clearly unintended. Has decreased appetite and night sweat over 1 year. Concerning for possible occult malignancy but may also be due to emphysema.     · Check labs   · Check CT of chest and abdomen  · Consider EGD, CT neck   · Current on colonoscopy and PSA.   · Advised to supplement meals twice daily with Boost/Ensure  · Follow-up 3 months

## 2021-08-27 NOTE — PROGRESS NOTES
MyChart:    Mikal, here are the result(s) of your test(s):     Labs were unremarkable.     Please do not hesitate to contact me if you have questions.

## 2021-09-02 ENCOUNTER — HOSPITAL ENCOUNTER (OUTPATIENT)
Dept: CT IMAGING | Facility: HOSPITAL | Age: 66
Discharge: HOME | End: 2021-09-02
Admitting: INTERNAL MEDICINE

## 2021-09-02 LAB — CREAT BLDA-MCNC: 0.7 MG/DL (ref 0.6–1.3)

## 2021-09-02 PROCEDURE — 74177 CT ABD & PELVIS W/CONTRAST: CPT

## 2021-09-02 PROCEDURE — 25010000002 IOPAMIDOL 61 % SOLUTION: Performed by: INTERNAL MEDICINE

## 2021-09-02 PROCEDURE — 82565 ASSAY OF CREATININE: CPT

## 2021-09-02 PROCEDURE — 71260 CT THORAX DX C+: CPT

## 2021-09-02 RX ADMIN — IOPAMIDOL 85 ML: 612 INJECTION, SOLUTION INTRAVENOUS at 09:22

## 2021-09-08 ENCOUNTER — TRANSCRIBE ORDERS (OUTPATIENT)
Dept: ADMINISTRATIVE | Facility: HOSPITAL | Age: 66
End: 2021-09-08

## 2021-09-08 DIAGNOSIS — R07.9 CHEST PAIN, UNSPECIFIED TYPE: Primary | ICD-10-CM

## 2021-09-08 DIAGNOSIS — R06.02 SOB (SHORTNESS OF BREATH): ICD-10-CM

## 2021-10-08 DIAGNOSIS — E78.00 PURE HYPERCHOLESTEROLEMIA: Chronic | ICD-10-CM

## 2021-10-08 DIAGNOSIS — I10 ESSENTIAL HYPERTENSION: Chronic | ICD-10-CM

## 2021-10-08 RX ORDER — LOSARTAN POTASSIUM 25 MG/1
25 TABLET ORAL DAILY
Qty: 90 TABLET | Refills: 3 | Status: SHIPPED | OUTPATIENT
Start: 2021-10-08 | End: 2022-09-30

## 2021-10-08 RX ORDER — ROSUVASTATIN CALCIUM 10 MG/1
10 TABLET, COATED ORAL NIGHTLY
Qty: 90 TABLET | Refills: 3 | Status: SHIPPED | OUTPATIENT
Start: 2021-10-08 | End: 2022-12-19

## 2021-11-29 ENCOUNTER — OFFICE VISIT (OUTPATIENT)
Dept: INTERNAL MEDICINE | Age: 66
End: 2021-11-29

## 2021-11-29 VITALS
HEART RATE: 76 BPM | DIASTOLIC BLOOD PRESSURE: 72 MMHG | SYSTOLIC BLOOD PRESSURE: 130 MMHG | WEIGHT: 183 LBS | BODY MASS INDEX: 25.62 KG/M2 | OXYGEN SATURATION: 95 % | HEIGHT: 71 IN | TEMPERATURE: 97.3 F

## 2021-11-29 DIAGNOSIS — F17.219 CIGARETTE NICOTINE DEPENDENCE WITH NICOTINE-INDUCED DISORDER: Chronic | ICD-10-CM

## 2021-11-29 DIAGNOSIS — J84.10 PULMONARY FIBROSIS (HCC): Chronic | ICD-10-CM

## 2021-11-29 DIAGNOSIS — R63.4 ABNORMAL WEIGHT LOSS: ICD-10-CM

## 2021-11-29 DIAGNOSIS — E11.9 TYPE 2 DIABETES MELLITUS WITHOUT COMPLICATION, WITHOUT LONG-TERM CURRENT USE OF INSULIN (HCC): Primary | ICD-10-CM

## 2021-11-29 DIAGNOSIS — R91.8 ABNORMAL CT LUNG SCREENING: ICD-10-CM

## 2021-11-29 DIAGNOSIS — E78.5 HYPERLIPIDEMIA, UNSPECIFIED HYPERLIPIDEMIA TYPE: ICD-10-CM

## 2021-11-29 DIAGNOSIS — J44.9 CHRONIC OBSTRUCTIVE PULMONARY DISEASE, UNSPECIFIED COPD TYPE (HCC): Chronic | ICD-10-CM

## 2021-11-29 DIAGNOSIS — I10 ESSENTIAL HYPERTENSION: Chronic | ICD-10-CM

## 2021-11-29 PROCEDURE — 99214 OFFICE O/P EST MOD 30 MIN: CPT | Performed by: INTERNAL MEDICINE

## 2021-11-29 NOTE — ASSESSMENT & PLAN NOTE
Continue Stiolto. Advised to quit smoking. He has bought some nicotine patch but has not started yet. Reports to smoking ~1/2 pack per day.     Advised to monitor O2 sats during activity at home.

## 2021-11-29 NOTE — PROGRESS NOTES
I N T E R N A L  M E D I C I N E  J U N O H  K I M,  M D      ENCOUNTER DATE:  11/29/2021    Mark L Sturgeon / 66 y.o. / male      CHIEF COMPLAINT / REASON FOR OFFICE VISIT     abnormal weight loss      ASSESSMENT & PLAN     Problem List Items Addressed This Visit        High    Abnormal weight loss    Current Assessment & Plan     Wt Readings from Last 3 Encounters:   11/29/21 83 kg (183 lb)   08/26/21 81.6 kg (180 lb)   01/18/21 89.4 kg (197 lb)        Slight wt gain of 3 lbs. Advised to continue to monitor for ongoing weight loss. Follow-up 4 months.             Medium    COPD (chronic obstructive pulmonary disease) (HCC) (Chronic)    Overview     *Guillaume         Current Assessment & Plan     Continue Stiolto. Advised to quit smoking. He has bought some nicotine patch but has not started yet. Reports to smoking ~1/2 pack per day.     Advised to monitor O2 sats during activity at home.          Relevant Medications    Tiotropium Bromide-Olodaterol (STIOLTO RESPIMAT IN)    Type 2 diabetes mellitus without complication, without long-term current use of insulin ([facility]) - Primary (Chronic)    Overview     Continue metformin  mg 2 BID.          Relevant Medications    Blood Glucose Monitoring Suppl (BLOOD GLUCOSE MONITOR SYSTEM) w/Device kit    glucose blood (ONETOUCH VERIO) test strip    Blood Glucose Monitoring Suppl (ONETOUCH VERIO) w/Device kit    Lancets (ONETOUCH ULTRASOFT) lancets    metFORMIN ER (GLUCOPHAGE-XR) 500 MG 24 hr tablet    Other Relevant Orders    Hemoglobin A1c    Hyperlipidemia (Chronic)    Overview     Continue rosuvastatin 10 mg qd.          Relevant Medications    rosuvastatin (CRESTOR) 10 MG tablet    Other Relevant Orders    Lipid Panel With / Chol / HDL Ratio    Essential hypertension (Chronic)    Overview     Continue losartan 25 mg qd.          Relevant Medications    losartan (COZAAR) 25 MG tablet    Pulmonary fibrosis (HCC) (Chronic)    Overview     *Guillaume         Relevant  "Medications    Tiotropium Bromide-Olodaterol (STIOLTO RESPIMAT IN)       Low    Cigarette nicotine dependence with nicotine-induced disorder (Chronic)    Current Assessment & Plan     Smoking ~1/2 pack per day   Bought nicotine patch but has not started         Relevant Medications    NICOTINE MINI 4 MG lozenge    nicotine (NICODERM CQ) 21 MG/24HR patch    Abnormal CT lung screening    Overview     Small cavitary lesion of RUL noted on screening CT (6/2019).   *Followed by pulmonologist              Orders Placed This Encounter   Procedures   • Hemoglobin A1c   • Lipid Panel With / Chol / HDL Ratio     No orders of the defined types were placed in this encounter.      SUMMARY/DISCUSSION  •       Next Appointment with me: Visit date not found    Return in about 4 months (around 3/29/2022) for Reassess chronic medical problems.      VITAL SIGNS     Visit Vitals  /72 (BP Location: Left arm)   Pulse 76   Temp 97.3 °F (36.3 °C)   Ht 180.3 cm (70.98\")   Wt 83 kg (183 lb)   SpO2 95%   BMI 25.54 kg/m²       BP Readings from Last 3 Encounters:   11/29/21 130/72   08/26/21 128/78   01/18/21 130/66     Wt Readings from Last 3 Encounters:   11/29/21 83 kg (183 lb)   08/26/21 81.6 kg (180 lb)   01/18/21 89.4 kg (197 lb)     Body mass index is 25.54 kg/m².      MEDICATIONS AT THE TIME OF OFFICE VISIT     Current Outpatient Medications on File Prior to Visit   Medication Sig   • Blood Glucose Monitoring Suppl (BLOOD GLUCOSE MONITOR SYSTEM) w/Device kit 1 kit 2 (Two) Times a Day.   • Blood Glucose Monitoring Suppl (ONETOUCH VERIO) w/Device kit 1 kit Daily.   • Cholecalciferol (VITAMIN D PO) Take 1 tablet by mouth Daily.   • Cyanocobalamin (VITAMIN B-12 PO) Take 1 tablet by mouth Daily.   • glucose blood (ONETOUCH VERIO) test strip Give patient what insurance cover .   • Lancets (ONETOUCH ULTRASOFT) lancets Use to test glucose once daily.  Dx DM2  E11.9   • losartan (COZAAR) 25 MG tablet TAKE 1 TABLET BY MOUTH DAILY   • " metFORMIN ER (GLUCOPHAGE-XR) 500 MG 24 hr tablet Take 2 tablets by mouth 2 (Two) Times a Day. TAKE 2 TABLET BY MOUTH TWICE DAILY WITH MEALS   • Multiple Vitamins-Minerals (MULTIVITAMIN PO) Take  by mouth.   • rosuvastatin (CRESTOR) 10 MG tablet TAKE 1 TABLET BY MOUTH EVERY NIGHT   • Tiotropium Bromide-Olodaterol (STIOLTO RESPIMAT IN) Inhale. 2 puff daily   • vitamin C (ASCORBIC ACID) 500 MG tablet Take 500 mg by mouth Daily.   • aspirin 81 MG EC tablet Take 1 tablet by mouth Daily.   • nicotine (NICODERM CQ) 21 MG/24HR patch Prn   • NICOTINE MINI 4 MG lozenge 1 LOZENGE Q 1 TO 2 H PER PACK INSTRUCTIONS   • Omega-3 Fatty Acids (FISH OIL) 1000 MG capsule capsule Take  by mouth.     No current facility-administered medications on file prior to visit.          HISTORY OF PRESENT ILLNESS     Saw Dr. Galaviz re: abnormal CT chest. To have additional workup for pulmonary fibrosis.   Smokes about 1/2 pack per day. Has not started NRT but bought the patches.   Weight is up 3 lbs. Denies significant change in appetite, early satiety, dysphagia, abdominal pain, change in bowels or blood in stool or urine.       Patient Care Team:  Edmundo Tyler MD as PCP - General (Internal Medicine)  Baylee Galaviz MD as Consulting Physician (Pulmonary Disease)    REVIEW OF SYSTEMS     Review of Systems   Wt gain of 3 lbs  Occasional sweats at night but not consistently  Denies loss of appetite, increased fatigue, fever   Denies chest pain   Denies increased cough or shortness of breath ; denies hemoptysis  No abdominal pain, melena or bleeding.    neg for hematuria     PHYSICAL EXAMINATION     Physical Exam  General: No acute distress  Psych: Normal thought and judgment   Cardiovascular Rate: normal. Rhythm: regular. Heart sounds: normal.   Lungs: decreased BS throughout without rales or wheezing       REVIEWED DATA     Labs:     Lab Results   Component Value Date     08/26/2021    K 4.6 08/26/2021    CALCIUM 9.9 08/26/2021    AST 17  08/26/2021    ALT 11 08/26/2021    BUN 10 08/26/2021    CREATININE 0.70 09/02/2021    CREATININE 0.74 (L) 08/26/2021    CREATININE 0.74 (L) 01/18/2021    EGFRIFNONA 105 08/26/2021    EGFRIFAFRI 127 08/26/2021       Lab Results   Component Value Date    HGBA1C 5.9 01/18/2021    HGBA1C 6.0 07/16/2020    HGBA1C 6.3 03/16/2020       Lab Results   Component Value Date    LDL 34 01/18/2021    LDL 26 11/25/2019    HDL 52 01/18/2021    TRIG 76 01/18/2021       Lab Results   Component Value Date    TSH 2.590 08/26/2021    TSH 2.050 11/25/2019    TSH 2.870 10/02/2018    FREET4 1.23 08/26/2021    FREET4 1.32 11/25/2019    FREET4 1.20 10/02/2018       Lab Results   Component Value Date    WBC 9.99 08/26/2021    HGB 15.0 08/26/2021     08/26/2021       No results found for: MICROALBUR        Imaging:     CT ABDOMEN PELVIS W CONTRAST-, CT CHEST W CONTRAST DIAGNOSTIC-     Radiation dose reduction techniques were utilized, including automated  exposure control and exposure modulation based on body size.     CLINICAL INFORMATION: Smoking history, weight loss, assess for  underlying malignancy.     COMPARISON CT chest examinations dating back to 06/03/2019.     FINDINGS: The rounded ringlike structure demonstrated within the right  upper lobe on image #40 is similar to or slightly smaller compared to  the previous examination. It measures approximately 7 mm. Thickened area  of bronchiectasis, pneumatocele or stable thin-walled cavitary lesion  are all possibilities. A similar area is demonstrated within the left  lower lobe on image #68, it is stable and smaller at 5 mm.     There is a 7 mm noncalcified nodular density again demonstrated on the  right minor fissure seen on image #60, remain stable as well.     There are areas of persistent bronchial wall thickening bilaterally  consistent with bronchitis. Prominent subpleural interstitial markings  demonstrated bilaterally, similar to the previous examination,  suggesting  early or mild fibrosis. No area of pleural effusion or acute  airspace disease has developed. No suspicious pulmonary lesion has  developed. Esophagus is satisfactory in course and caliber, heart size  within normal limits. No pericardial abnormality seen. Small stable  calcified and noncalcified mediastinal and bilateral hilar lymph nodes  without interval change. Atherosclerotic calcification of the aorta with  coronary artery calcification.     Tiny hepatic cyst within the posterior segment right lobe of the liver.  The gallbladder is normal in appearance. No biliary duct dilatation. The  pancreas and spleen have a normal appearance. The right adrenal gland is  satisfactory in appearance. There is a hypodense 14 mm left adrenal  nodule which was present on the previous CT examination, likely adenoma.     There are bilateral renal cysts. There is a 2-3 mm solitary  nonobstructing right renal calculus and a similar size nonobstructing  left renal calculus. The ureters are normal in course and caliber to the  urinary bladder which is typical in appearance. There are calcifications  within the central prostate. Calcification of the seminal vesicles.     Diverticulosis of the colon, no indication of diverticulitis. The  appendix is normal. The small bowel is satisfactory in appearance.  Stomach is collapsed, contour is within normal limits. Stomach cannot be  optimally evaluated. No duodenal abnormality seen.     No lytic or sclerotic bone lesion.     CONCLUSION:  1. There is no indication of thoracic, abdominal or pelvic neoplasm.  2. Likely small left adrenal adenoma.  3. Liver and bilateral renal cysts. Tiny solitary nonobstructing renal  calculi.   4. Diverticulosis of the colon.  5. There are 2 small ringlike areas demonstrated within the lungs,  similar to previous examinations supporting a benign etiology. In  addition there is a stable pulmonary nodule.  6. Abnormal subpleural interstitium suggests early or  mild fibrosis.  Chronic areas of bronchial wall thickening likely chronic bronchitis.        This report was finalized on 9/2/2021       Medical Tests:           Summary of old records / correspondence / consultant report:     Pulmonologist note re: COPD / IPF, planned labs and PFT      Request outside records:             *Examiner was wearing KN95 mask and eye protection during the entire duration of the visit. Patient was masked the entire time. Minimum social distance of 6 ft maintained entire visit except if physical contact was necessary as documented.     **Dragon Disclaimer: Much of this encounter note is an electronic transcription/translation of spoken language to printed text. The electronic translation of spoken language may permit erroneous, or at times, nonsensical words or phrases to be inadvertently transcribed. Although I have reviewed the note for such errors, some may still exist.       Template created by Mandy Tyler MD

## 2021-11-29 NOTE — ASSESSMENT & PLAN NOTE
Wt Readings from Last 3 Encounters:   11/29/21 83 kg (183 lb)   08/26/21 81.6 kg (180 lb)   01/18/21 89.4 kg (197 lb)        Slight wt gain of 3 lbs. Advised to continue to monitor for ongoing weight loss. Follow-up 4 months.

## 2021-11-29 NOTE — PATIENT INSTRUCTIONS
** IMPORTANT MESSAGE FROM DR. BRICEÑO **    In our office, your satisfaction is VERY important to us.     You may receive a survey from [facility] by mail or E-mail for you to provide feedback about your visit. This information is invaluable for me to know what we can do to improve our services.     I ask that you please take a few minutes to complete the survey and let us know how we are doing in serving your needs. (You may receive the survey more than once for multiple visits)    Thank You !    Dr. Briceño & Staff    _________________________________________________________________________________________________________________________      ** ADDITIONAL INSTRUCTION / REMINDERS FROM DR. BRICEÑO **

## 2021-11-30 LAB
CHOLEST SERPL-MCNC: 116 MG/DL (ref 100–199)
CHOLEST/HDLC SERPL: 2 RATIO (ref 0–5)
HBA1C MFR BLD: 6.1 % (ref 4.8–5.6)
HDLC SERPL-MCNC: 59 MG/DL
LDLC SERPL CALC-MCNC: 40 MG/DL (ref 0–99)
TRIGL SERPL-MCNC: 86 MG/DL (ref 0–149)
VLDLC SERPL CALC-MCNC: 17 MG/DL (ref 5–40)

## 2022-02-04 DIAGNOSIS — R73.03 PREDIABETES: Chronic | ICD-10-CM

## 2022-02-04 RX ORDER — METFORMIN HYDROCHLORIDE 500 MG/1
TABLET, EXTENDED RELEASE ORAL
Qty: 360 TABLET | Refills: 3 | Status: SHIPPED | OUTPATIENT
Start: 2022-02-04 | End: 2022-03-02

## 2022-02-20 ENCOUNTER — APPOINTMENT (OUTPATIENT)
Dept: CT IMAGING | Facility: HOSPITAL | Age: 67
End: 2022-02-20

## 2022-02-20 ENCOUNTER — HOSPITAL ENCOUNTER (INPATIENT)
Facility: HOSPITAL | Age: 67
LOS: 7 days | Discharge: HOME | End: 2022-02-27
Attending: EMERGENCY MEDICINE

## 2022-02-20 DIAGNOSIS — D62 ABLA (ACUTE BLOOD LOSS ANEMIA): ICD-10-CM

## 2022-02-20 DIAGNOSIS — R42 ORTHOSTATIC DIZZINESS: ICD-10-CM

## 2022-02-20 DIAGNOSIS — D62 ACUTE BLOOD LOSS ANEMIA: ICD-10-CM

## 2022-02-20 DIAGNOSIS — K92.1 GASTROINTESTINAL HEMORRHAGE WITH MELENA: Primary | ICD-10-CM

## 2022-02-20 PROBLEM — Z78.9 ALCOHOL USE: Status: ACTIVE | Noted: 2022-02-20

## 2022-02-20 PROBLEM — F10.90 ALCOHOL USE: Status: ACTIVE | Noted: 2022-02-20

## 2022-02-20 LAB
ABO GROUP BLD: NORMAL
ALBUMIN SERPL-MCNC: 3 G/DL (ref 3.5–5.2)
ALBUMIN/GLOB SERPL: 1.8 G/DL
ALP SERPL-CCNC: 30 U/L (ref 39–117)
ALT SERPL W P-5'-P-CCNC: 8 U/L (ref 1–41)
ANION GAP SERPL CALCULATED.3IONS-SCNC: 15 MMOL/L (ref 5–15)
APTT PPP: 25.6 SECONDS (ref 22.7–35.4)
AST SERPL-CCNC: 11 U/L (ref 1–40)
BASOPHILS # BLD AUTO: 0.03 10*3/MM3 (ref 0–0.2)
BASOPHILS NFR BLD AUTO: 0.3 % (ref 0–1.5)
BILIRUB SERPL-MCNC: <0.2 MG/DL (ref 0–1.2)
BLD GP AB SCN SERPL QL: NEGATIVE
BUN SERPL-MCNC: 36 MG/DL (ref 8–23)
BUN/CREAT SERPL: 41.9 (ref 7–25)
CALCIUM SPEC-SCNC: 8.1 MG/DL (ref 8.6–10.5)
CHLORIDE SERPL-SCNC: 101 MMOL/L (ref 98–107)
CO2 SERPL-SCNC: 23 MMOL/L (ref 22–29)
CREAT SERPL-MCNC: 0.86 MG/DL (ref 0.76–1.27)
DEPRECATED RDW RBC AUTO: 41.3 FL (ref 37–54)
EOSINOPHIL # BLD AUTO: 0.14 10*3/MM3 (ref 0–0.4)
EOSINOPHIL NFR BLD AUTO: 1.3 % (ref 0.3–6.2)
ERYTHROCYTE [DISTWIDTH] IN BLOOD BY AUTOMATED COUNT: 12.1 % (ref 12.3–15.4)
FERRITIN SERPL-MCNC: 37.4 NG/ML (ref 30–400)
GFR SERPL CREATININE-BSD FRML MDRD: 88 ML/MIN/1.73
GLOBULIN UR ELPH-MCNC: 1.7 GM/DL
GLUCOSE BLDC GLUCOMTR-MCNC: 155 MG/DL (ref 70–130)
GLUCOSE SERPL-MCNC: 129 MG/DL (ref 65–99)
HCT VFR BLD AUTO: 17.4 % (ref 37.5–51)
HGB BLD-MCNC: 5.8 G/DL (ref 13–17.7)
IMM GRANULOCYTES # BLD AUTO: 0.06 10*3/MM3 (ref 0–0.05)
IMM GRANULOCYTES NFR BLD AUTO: 0.6 % (ref 0–0.5)
INR PPP: 1.11 (ref 0.9–1.1)
IRON 24H UR-MRATE: 89 MCG/DL (ref 59–158)
IRON SATN MFR SERPL: 36 % (ref 20–50)
LIPASE SERPL-CCNC: 62 U/L (ref 13–60)
LYMPHOCYTES # BLD AUTO: 2.69 10*3/MM3 (ref 0.7–3.1)
LYMPHOCYTES NFR BLD AUTO: 25.5 % (ref 19.6–45.3)
MCH RBC QN AUTO: 31.9 PG (ref 26.6–33)
MCHC RBC AUTO-ENTMCNC: 33.3 G/DL (ref 31.5–35.7)
MCV RBC AUTO: 95.6 FL (ref 79–97)
MONOCYTES # BLD AUTO: 0.93 10*3/MM3 (ref 0.1–0.9)
MONOCYTES NFR BLD AUTO: 8.8 % (ref 5–12)
NEUTROPHILS NFR BLD AUTO: 6.71 10*3/MM3 (ref 1.7–7)
NEUTROPHILS NFR BLD AUTO: 63.5 % (ref 42.7–76)
NRBC BLD AUTO-RTO: 0 /100 WBC (ref 0–0.2)
PLATELET # BLD AUTO: 155 10*3/MM3 (ref 140–450)
PMV BLD AUTO: 12.3 FL (ref 6–12)
POTASSIUM SERPL-SCNC: 3.8 MMOL/L (ref 3.5–5.2)
PROT SERPL-MCNC: 4.7 G/DL (ref 6–8.5)
PROTHROMBIN TIME: 14.2 SECONDS (ref 11.7–14.2)
RBC # BLD AUTO: 1.82 10*6/MM3 (ref 4.14–5.8)
RH BLD: POSITIVE
SARS-COV-2 RNA RESP QL NAA+PROBE: NOT DETECTED
SODIUM SERPL-SCNC: 139 MMOL/L (ref 136–145)
T&S EXPIRATION DATE: NORMAL
TIBC SERPL-MCNC: 247 MCG/DL (ref 298–536)
TRANSFERRIN SERPL-MCNC: 166 MG/DL (ref 200–360)
WBC NRBC COR # BLD: 10.56 10*3/MM3 (ref 3.4–10.8)

## 2022-02-20 PROCEDURE — 93010 ELECTROCARDIOGRAM REPORT: CPT | Performed by: INTERNAL MEDICINE

## 2022-02-20 PROCEDURE — 86920 COMPATIBILITY TEST SPIN: CPT

## 2022-02-20 PROCEDURE — U0003 INFECTIOUS AGENT DETECTION BY NUCLEIC ACID (DNA OR RNA); SEVERE ACUTE RESPIRATORY SYNDROME CORONAVIRUS 2 (SARS-COV-2) (CORONAVIRUS DISEASE [COVID-19]), AMPLIFIED PROBE TECHNIQUE, MAKING USE OF HIGH THROUGHPUT TECHNOLOGIES AS DESCRIBED BY CMS-2020-01-R: HCPCS | Performed by: EMERGENCY MEDICINE

## 2022-02-20 PROCEDURE — 84466 ASSAY OF TRANSFERRIN: CPT | Performed by: NURSE PRACTITIONER

## 2022-02-20 PROCEDURE — 36415 COLL VENOUS BLD VENIPUNCTURE: CPT

## 2022-02-20 PROCEDURE — P9016 RBC LEUKOCYTES REDUCED: HCPCS

## 2022-02-20 PROCEDURE — 36430 TRANSFUSION BLD/BLD COMPNT: CPT

## 2022-02-20 PROCEDURE — 80053 COMPREHEN METABOLIC PANEL: CPT | Performed by: EMERGENCY MEDICINE

## 2022-02-20 PROCEDURE — 83540 ASSAY OF IRON: CPT | Performed by: NURSE PRACTITIONER

## 2022-02-20 PROCEDURE — 82962 GLUCOSE BLOOD TEST: CPT

## 2022-02-20 PROCEDURE — 85730 THROMBOPLASTIN TIME PARTIAL: CPT | Performed by: EMERGENCY MEDICINE

## 2022-02-20 PROCEDURE — 85610 PROTHROMBIN TIME: CPT | Performed by: EMERGENCY MEDICINE

## 2022-02-20 PROCEDURE — 86850 RBC ANTIBODY SCREEN: CPT | Performed by: EMERGENCY MEDICINE

## 2022-02-20 PROCEDURE — 74174 CTA ABD&PLVS W/CONTRAST: CPT

## 2022-02-20 PROCEDURE — 86900 BLOOD TYPING SEROLOGIC ABO: CPT

## 2022-02-20 PROCEDURE — 93005 ELECTROCARDIOGRAM TRACING: CPT | Performed by: EMERGENCY MEDICINE

## 2022-02-20 PROCEDURE — 86900 BLOOD TYPING SEROLOGIC ABO: CPT | Performed by: EMERGENCY MEDICINE

## 2022-02-20 PROCEDURE — 86901 BLOOD TYPING SEROLOGIC RH(D): CPT | Performed by: EMERGENCY MEDICINE

## 2022-02-20 PROCEDURE — 85025 COMPLETE CBC W/AUTO DIFF WBC: CPT | Performed by: EMERGENCY MEDICINE

## 2022-02-20 PROCEDURE — U0005 INFEC AGEN DETEC AMPLI PROBE: HCPCS | Performed by: EMERGENCY MEDICINE

## 2022-02-20 PROCEDURE — 82728 ASSAY OF FERRITIN: CPT | Performed by: NURSE PRACTITIONER

## 2022-02-20 PROCEDURE — 99291 CRITICAL CARE FIRST HOUR: CPT

## 2022-02-20 PROCEDURE — 0 IOPAMIDOL PER 1 ML: Performed by: INTERNAL MEDICINE

## 2022-02-20 PROCEDURE — 83690 ASSAY OF LIPASE: CPT | Performed by: EMERGENCY MEDICINE

## 2022-02-20 RX ORDER — SODIUM CHLORIDE 0.9 % (FLUSH) 0.9 %
10 SYRINGE (ML) INJECTION AS NEEDED
Status: DISCONTINUED | OUTPATIENT
Start: 2022-02-20 | End: 2022-02-27

## 2022-02-20 RX ORDER — ONDANSETRON 2 MG/ML
4 INJECTION INTRAMUSCULAR; INTRAVENOUS EVERY 6 HOURS PRN
Status: DISCONTINUED | OUTPATIENT
Start: 2022-02-20 | End: 2022-02-27

## 2022-02-20 RX ORDER — NICOTINE POLACRILEX 4 MG
15 LOZENGE BUCCAL
Status: DISCONTINUED | OUTPATIENT
Start: 2022-02-20 | End: 2022-02-27

## 2022-02-20 RX ORDER — SODIUM CHLORIDE 0.9 % (FLUSH) 0.9 %
10 SYRINGE (ML) INJECTION EVERY 12 HOURS SCHEDULED
Status: DISCONTINUED | OUTPATIENT
Start: 2022-02-20 | End: 2022-02-27

## 2022-02-20 RX ORDER — LORAZEPAM 1 MG/1
1 TABLET ORAL
Status: DISCONTINUED | OUTPATIENT
Start: 2022-02-20 | End: 2022-02-27

## 2022-02-20 RX ORDER — LORAZEPAM 2 MG/ML
1 INJECTION INTRAMUSCULAR
Status: DISCONTINUED | OUTPATIENT
Start: 2022-02-20 | End: 2022-02-27

## 2022-02-20 RX ORDER — DEXTROSE MONOHYDRATE 25 G/50ML
25 INJECTION, SOLUTION INTRAVENOUS
Status: DISCONTINUED | OUTPATIENT
Start: 2022-02-20 | End: 2022-02-27

## 2022-02-20 RX ORDER — PANTOPRAZOLE SODIUM 40 MG/10ML
80 INJECTION, POWDER, LYOPHILIZED, FOR SOLUTION INTRAVENOUS ONCE
Status: COMPLETED | OUTPATIENT
Start: 2022-02-20 | End: 2022-02-20

## 2022-02-20 RX ORDER — ONDANSETRON 2 MG/ML
4 INJECTION INTRAMUSCULAR; INTRAVENOUS EVERY 6 HOURS PRN
Status: DISCONTINUED | OUTPATIENT
Start: 2022-02-20 | End: 2022-02-20 | Stop reason: SDUPTHER

## 2022-02-20 RX ORDER — SODIUM CHLORIDE 0.9 % (FLUSH) 0.9 %
10 SYRINGE (ML) INJECTION AS NEEDED
Status: DISCONTINUED | OUTPATIENT
Start: 2022-02-20 | End: 2022-02-27 | Stop reason: HOSPADM

## 2022-02-20 RX ORDER — INSULIN LISPRO 100 [IU]/ML
0-7 INJECTION, SOLUTION INTRAVENOUS; SUBCUTANEOUS
Status: DISCONTINUED | OUTPATIENT
Start: 2022-02-21 | End: 2022-02-27

## 2022-02-20 RX ORDER — NITROGLYCERIN 0.4 MG/1
0.4 TABLET SUBLINGUAL
Status: DISCONTINUED | OUTPATIENT
Start: 2022-02-20 | End: 2022-02-27

## 2022-02-20 RX ORDER — ACETAMINOPHEN 160 MG/5ML
650 SOLUTION ORAL EVERY 4 HOURS PRN
Status: DISCONTINUED | OUTPATIENT
Start: 2022-02-20 | End: 2022-02-27

## 2022-02-20 RX ORDER — LORAZEPAM 2 MG/ML
0.5 INJECTION INTRAMUSCULAR
Status: DISCONTINUED | OUTPATIENT
Start: 2022-02-20 | End: 2022-02-27

## 2022-02-20 RX ORDER — DIPHENOXYLATE HYDROCHLORIDE AND ATROPINE SULFATE 2.5; .025 MG/1; MG/1
1 TABLET ORAL DAILY
Status: DISPENSED | OUTPATIENT
Start: 2022-02-21 | End: 2022-02-24

## 2022-02-20 RX ORDER — ACETAMINOPHEN 325 MG/1
650 TABLET ORAL EVERY 4 HOURS PRN
Status: DISCONTINUED | OUTPATIENT
Start: 2022-02-20 | End: 2022-02-27

## 2022-02-20 RX ORDER — FOLIC ACID 1 MG/1
1 TABLET ORAL DAILY
Status: DISPENSED | OUTPATIENT
Start: 2022-02-21 | End: 2022-02-24

## 2022-02-20 RX ORDER — ACETAMINOPHEN 650 MG/1
650 SUPPOSITORY RECTAL EVERY 4 HOURS PRN
Status: DISCONTINUED | OUTPATIENT
Start: 2022-02-20 | End: 2022-02-27

## 2022-02-20 RX ORDER — LORAZEPAM 0.5 MG/1
0.5 TABLET ORAL
Status: DISCONTINUED | OUTPATIENT
Start: 2022-02-20 | End: 2022-02-27

## 2022-02-20 RX ORDER — ONDANSETRON 4 MG/1
4 TABLET, FILM COATED ORAL EVERY 6 HOURS PRN
Status: DISCONTINUED | OUTPATIENT
Start: 2022-02-20 | End: 2022-02-27

## 2022-02-20 RX ADMIN — SODIUM CHLORIDE, POTASSIUM CHLORIDE, SODIUM LACTATE AND CALCIUM CHLORIDE 1000 ML: 600; 310; 30; 20 INJECTION, SOLUTION INTRAVENOUS at 20:03

## 2022-02-20 RX ADMIN — PANTOPRAZOLE SODIUM 8 MG/HR: 40 INJECTION, POWDER, FOR SOLUTION INTRAVENOUS at 20:17

## 2022-02-20 RX ADMIN — SODIUM CHLORIDE, PRESERVATIVE FREE 10 ML: 5 INJECTION INTRAVENOUS at 22:38

## 2022-02-20 RX ADMIN — IOPAMIDOL 100 ML: 755 INJECTION, SOLUTION INTRAVENOUS at 23:12

## 2022-02-20 RX ADMIN — PANTOPRAZOLE SODIUM 80 MG: 40 INJECTION, POWDER, FOR SOLUTION INTRAVENOUS at 20:17

## 2022-02-21 ENCOUNTER — ANESTHESIA (OUTPATIENT)
Dept: GASTROENTEROLOGY | Facility: HOSPITAL | Age: 67
End: 2022-02-21

## 2022-02-21 ENCOUNTER — ANESTHESIA EVENT (OUTPATIENT)
Dept: GASTROENTEROLOGY | Facility: HOSPITAL | Age: 67
End: 2022-02-21

## 2022-02-21 LAB
ANION GAP SERPL CALCULATED.3IONS-SCNC: 7.4 MMOL/L (ref 5–15)
BASOPHILS # BLD AUTO: 0.02 10*3/MM3 (ref 0–0.2)
BASOPHILS NFR BLD AUTO: 0.2 % (ref 0–1.5)
BH BB BLOOD EXPIRATION DATE: NORMAL
BH BB BLOOD TYPE BARCODE: 5100
BH BB DISPENSE STATUS: NORMAL
BH BB PRODUCT CODE: NORMAL
BH BB UNIT NUMBER: NORMAL
BUN SERPL-MCNC: 42 MG/DL (ref 8–23)
BUN/CREAT SERPL: 63.6 (ref 7–25)
CALCIUM SPEC-SCNC: 7.2 MG/DL (ref 8.6–10.5)
CHLORIDE SERPL-SCNC: 109 MMOL/L (ref 98–107)
CO2 SERPL-SCNC: 21.6 MMOL/L (ref 22–29)
CREAT SERPL-MCNC: 0.66 MG/DL (ref 0.76–1.27)
CROSSMATCH INTERPRETATION: NORMAL
DEPRECATED RDW RBC AUTO: 44.5 FL (ref 37–54)
EOSINOPHIL # BLD AUTO: 0.08 10*3/MM3 (ref 0–0.4)
EOSINOPHIL NFR BLD AUTO: 0.9 % (ref 0.3–6.2)
ERYTHROCYTE [DISTWIDTH] IN BLOOD BY AUTOMATED COUNT: 13.8 % (ref 12.3–15.4)
GFR SERPL CREATININE-BSD FRML MDRD: 120 ML/MIN/1.73
GLUCOSE BLDC GLUCOMTR-MCNC: 122 MG/DL (ref 70–130)
GLUCOSE BLDC GLUCOMTR-MCNC: 136 MG/DL (ref 70–130)
GLUCOSE BLDC GLUCOMTR-MCNC: 138 MG/DL (ref 70–130)
GLUCOSE BLDC GLUCOMTR-MCNC: 168 MG/DL (ref 70–130)
GLUCOSE SERPL-MCNC: 123 MG/DL (ref 65–99)
HCT VFR BLD AUTO: 20 % (ref 37.5–51)
HCT VFR BLD AUTO: 20.6 % (ref 37.5–51)
HCT VFR BLD AUTO: 22.6 % (ref 37.5–51)
HCT VFR BLD AUTO: 24.2 % (ref 37.5–51)
HCT VFR BLD AUTO: 24.9 % (ref 37.5–51)
HGB BLD-MCNC: 6.8 G/DL (ref 13–17.7)
HGB BLD-MCNC: 7 G/DL (ref 13–17.7)
HGB BLD-MCNC: 7.9 G/DL (ref 13–17.7)
HGB BLD-MCNC: 8 G/DL (ref 13–17.7)
HGB BLD-MCNC: 8.3 G/DL (ref 13–17.7)
IMM GRANULOCYTES # BLD AUTO: 0.06 10*3/MM3 (ref 0–0.05)
IMM GRANULOCYTES NFR BLD AUTO: 0.7 % (ref 0–0.5)
INR PPP: 1.29 (ref 0.9–1.1)
LYMPHOCYTES # BLD AUTO: 1.88 10*3/MM3 (ref 0.7–3.1)
LYMPHOCYTES NFR BLD AUTO: 20.8 % (ref 19.6–45.3)
MCH RBC QN AUTO: 30.5 PG (ref 26.6–33)
MCHC RBC AUTO-ENTMCNC: 34 G/DL (ref 31.5–35.7)
MCV RBC AUTO: 89.7 FL (ref 79–97)
MONOCYTES # BLD AUTO: 0.92 10*3/MM3 (ref 0.1–0.9)
MONOCYTES NFR BLD AUTO: 10.2 % (ref 5–12)
NEUTROPHILS NFR BLD AUTO: 6.08 10*3/MM3 (ref 1.7–7)
NEUTROPHILS NFR BLD AUTO: 67.2 % (ref 42.7–76)
NRBC BLD AUTO-RTO: 0.1 /100 WBC (ref 0–0.2)
PLATELET # BLD AUTO: 83 10*3/MM3 (ref 140–450)
PMV BLD AUTO: 12.1 FL (ref 6–12)
POTASSIUM SERPL-SCNC: 4.4 MMOL/L (ref 3.5–5.2)
PROTHROMBIN TIME: 16.1 SECONDS (ref 11.7–14.2)
QT INTERVAL: 372 MS
RBC # BLD AUTO: 2.23 10*6/MM3 (ref 4.14–5.8)
SODIUM SERPL-SCNC: 138 MMOL/L (ref 136–145)
UNIT  ABO: NORMAL
UNIT  RH: NORMAL
WBC NRBC COR # BLD: 9.04 10*3/MM3 (ref 3.4–10.8)

## 2022-02-21 PROCEDURE — 25010000002 PROPOFOL 10 MG/ML EMULSION: Performed by: ANESTHESIOLOGY

## 2022-02-21 PROCEDURE — P9016 RBC LEUKOCYTES REDUCED: HCPCS

## 2022-02-21 PROCEDURE — 88342 IMHCHEM/IMCYTCHM 1ST ANTB: CPT | Performed by: INTERNAL MEDICINE

## 2022-02-21 PROCEDURE — 0DB68ZX EXCISION OF STOMACH, VIA NATURAL OR ARTIFICIAL OPENING ENDOSCOPIC, DIAGNOSTIC: ICD-10-PCS | Performed by: INTERNAL MEDICINE

## 2022-02-21 PROCEDURE — 85014 HEMATOCRIT: CPT | Performed by: INTERNAL MEDICINE

## 2022-02-21 PROCEDURE — 43270 EGD LESION ABLATION: CPT | Performed by: INTERNAL MEDICINE

## 2022-02-21 PROCEDURE — 85018 HEMOGLOBIN: CPT | Performed by: INTERNAL MEDICINE

## 2022-02-21 PROCEDURE — 25010000002 EPINEPHRINE PER 0.1 MG: Performed by: INTERNAL MEDICINE

## 2022-02-21 PROCEDURE — 82962 GLUCOSE BLOOD TEST: CPT

## 2022-02-21 PROCEDURE — 43239 EGD BIOPSY SINGLE/MULTIPLE: CPT | Performed by: INTERNAL MEDICINE

## 2022-02-21 PROCEDURE — 85610 PROTHROMBIN TIME: CPT | Performed by: NURSE PRACTITIONER

## 2022-02-21 PROCEDURE — 86900 BLOOD TYPING SEROLOGIC ABO: CPT

## 2022-02-21 PROCEDURE — 36430 TRANSFUSION BLD/BLD COMPNT: CPT

## 2022-02-21 PROCEDURE — 88305 TISSUE EXAM BY PATHOLOGIST: CPT | Performed by: INTERNAL MEDICINE

## 2022-02-21 PROCEDURE — 85014 HEMATOCRIT: CPT | Performed by: NURSE PRACTITIONER

## 2022-02-21 PROCEDURE — 0W3P8ZZ CONTROL BLEEDING IN GASTROINTESTINAL TRACT, VIA NATURAL OR ARTIFICIAL OPENING ENDOSCOPIC: ICD-10-PCS | Performed by: OBSTETRICS & GYNECOLOGY

## 2022-02-21 PROCEDURE — 85025 COMPLETE CBC W/AUTO DIFF WBC: CPT | Performed by: NURSE PRACTITIONER

## 2022-02-21 PROCEDURE — 25010000002 OCTREOTIDE PER 25 MCG: Performed by: INTERNAL MEDICINE

## 2022-02-21 PROCEDURE — 85018 HEMOGLOBIN: CPT | Performed by: NURSE PRACTITIONER

## 2022-02-21 PROCEDURE — 63710000001 ONDANSETRON PER 8 MG: Performed by: INTERNAL MEDICINE

## 2022-02-21 PROCEDURE — 86901 BLOOD TYPING SEROLOGIC RH(D): CPT

## 2022-02-21 PROCEDURE — 80048 BASIC METABOLIC PNL TOTAL CA: CPT | Performed by: NURSE PRACTITIONER

## 2022-02-21 PROCEDURE — 25010000002 THIAMINE PER 100 MG: Performed by: NURSE PRACTITIONER

## 2022-02-21 RX ORDER — PROPOFOL 10 MG/ML
VIAL (ML) INTRAVENOUS AS NEEDED
Status: DISCONTINUED | OUTPATIENT
Start: 2022-02-21 | End: 2022-02-21 | Stop reason: SURG

## 2022-02-21 RX ORDER — SODIUM CHLORIDE, SODIUM LACTATE, POTASSIUM CHLORIDE, CALCIUM CHLORIDE 600; 310; 30; 20 MG/100ML; MG/100ML; MG/100ML; MG/100ML
INJECTION, SOLUTION INTRAVENOUS CONTINUOUS PRN
Status: DISCONTINUED | OUTPATIENT
Start: 2022-02-21 | End: 2022-02-21 | Stop reason: SURG

## 2022-02-21 RX ORDER — LIDOCAINE HYDROCHLORIDE 20 MG/ML
INJECTION, SOLUTION INFILTRATION; PERINEURAL AS NEEDED
Status: DISCONTINUED | OUTPATIENT
Start: 2022-02-21 | End: 2022-02-21 | Stop reason: SURG

## 2022-02-21 RX ORDER — EPHEDRINE SULFATE 50 MG/ML
INJECTION, SOLUTION INTRAVENOUS AS NEEDED
Status: DISCONTINUED | OUTPATIENT
Start: 2022-02-21 | End: 2022-02-21 | Stop reason: SURG

## 2022-02-21 RX ORDER — PROPOFOL 10 MG/ML
VIAL (ML) INTRAVENOUS CONTINUOUS PRN
Status: DISCONTINUED | OUTPATIENT
Start: 2022-02-21 | End: 2022-02-21 | Stop reason: SURG

## 2022-02-21 RX ADMIN — SODIUM CHLORIDE, POTASSIUM CHLORIDE, SODIUM LACTATE AND CALCIUM CHLORIDE 1000 ML: 600; 310; 30; 20 INJECTION, SOLUTION INTRAVENOUS at 00:13

## 2022-02-21 RX ADMIN — SODIUM CHLORIDE, PRESERVATIVE FREE 10 ML: 5 INJECTION INTRAVENOUS at 09:16

## 2022-02-21 RX ADMIN — SODIUM CHLORIDE, PRESERVATIVE FREE 10 ML: 5 INJECTION INTRAVENOUS at 20:56

## 2022-02-21 RX ADMIN — PANTOPRAZOLE SODIUM 8 MG/HR: 40 INJECTION, POWDER, FOR SOLUTION INTRAVENOUS at 05:51

## 2022-02-21 RX ADMIN — LIDOCAINE HYDROCHLORIDE 60 MG: 20 INJECTION, SOLUTION INFILTRATION; PERINEURAL at 09:40

## 2022-02-21 RX ADMIN — SODIUM CHLORIDE, PRESERVATIVE FREE 10 ML: 5 INJECTION INTRAVENOUS at 20:57

## 2022-02-21 RX ADMIN — PANTOPRAZOLE SODIUM 8 MG/HR: 40 INJECTION, POWDER, FOR SOLUTION INTRAVENOUS at 01:02

## 2022-02-21 RX ADMIN — EPHEDRINE SULFATE 10 MG: 50 INJECTION INTRAVENOUS at 09:47

## 2022-02-21 RX ADMIN — Medication 150 MCG/KG/MIN: at 09:41

## 2022-02-21 RX ADMIN — SODIUM CHLORIDE, POTASSIUM CHLORIDE, SODIUM LACTATE AND CALCIUM CHLORIDE: 600; 310; 30; 20 INJECTION, SOLUTION INTRAVENOUS at 10:09

## 2022-02-21 RX ADMIN — PANTOPRAZOLE SODIUM 8 MG/HR: 40 INJECTION, POWDER, FOR SOLUTION INTRAVENOUS at 10:39

## 2022-02-21 RX ADMIN — PROPOFOL 75 MG: 10 INJECTION, EMULSION INTRAVENOUS at 09:41

## 2022-02-21 RX ADMIN — ONDANSETRON HYDROCHLORIDE 4 MG: 4 TABLET, FILM COATED ORAL at 10:39

## 2022-02-21 RX ADMIN — PANTOPRAZOLE SODIUM 8 MG/HR: 40 INJECTION, POWDER, FOR SOLUTION INTRAVENOUS at 21:42

## 2022-02-21 RX ADMIN — THIAMINE HYDROCHLORIDE 100 MG: 100 INJECTION, SOLUTION INTRAMUSCULAR; INTRAVENOUS at 03:06

## 2022-02-21 RX ADMIN — OCTREOTIDE ACETATE 25 MCG/HR: 500 INJECTION, SOLUTION INTRAVENOUS; SUBCUTANEOUS at 05:52

## 2022-02-21 RX ADMIN — PANTOPRAZOLE SODIUM 8 MG/HR: 40 INJECTION, POWDER, FOR SOLUTION INTRAVENOUS at 16:46

## 2022-02-21 NOTE — ANESTHESIA PREPROCEDURE EVALUATION
Anesthesia Evaluation     Patient summary reviewed   NPO Solid Status: > 8 hours             Airway   No difficulty expected  Dental      Pulmonary    (+) COPD, asthma,  Cardiovascular     ECG reviewed  Rhythm: regular    (+) hypertension, CAD,       Neuro/Psych  GI/Hepatic/Renal/Endo    (+)   diabetes mellitus,     Musculoskeletal     Abdominal    Substance History   (+) alcohol use,      OB/GYN          Other   arthritis,                      Anesthesia Plan    ASA 3     MAC       Anesthetic plan, all risks, benefits, and alternatives have been provided, discussed and informed consent has been obtained with: patient.  Use of blood products discussed with patient .       CODE STATUS:    Code Status (Patient has no pulse and is not breathing): CPR (Attempt to Resuscitate)  Medical Interventions (Patient has pulse or is breathing): Full Support

## 2022-02-21 NOTE — ANESTHESIA POSTPROCEDURE EVALUATION
Patient: Mark L Sturgeon    Procedure Summary     Date: 02/21/22 Room / Location:  [facility] /  [facility]    Anesthesia Start: 0937 Anesthesia Stop: 0958    Procedure: ESOPHAGOGASTRODUODENOSCOPY AT BEDSIDE (N/A Esophagus) Diagnosis:       Gastrointestinal hemorrhage with melena      Acute blood loss anemia      (Gastrointestinal hemorrhage with melena [K92.1])      (Acute blood loss anemia [D62])    Surgeons: Leroy Lion MD Provider: Alber Mallory MD    Anesthesia Type: MAC ASA Status: 3          Anesthesia Type: MAC    Vitals  Vitals Value Taken Time   BP 91/63 02/21/22 1006   Temp     Pulse 105 02/21/22 1009   Resp     SpO2 98 % 02/21/22 1009   Vitals shown include unvalidated device data.        Post Anesthesia Care and Evaluation    Patient location during evaluation: ICU  Patient participation: complete - patient participated  Level of consciousness: awake  Pain score: 1  Pain management: adequate  Airway patency: patent  Anesthetic complications: No anesthetic complications  PONV Status: none  Cardiovascular status: acceptable  Respiratory status: acceptable  Hydration status: acceptable

## 2022-02-22 LAB
ALBUMIN SERPL-MCNC: 2.5 G/DL (ref 3.5–5.2)
ANION GAP SERPL CALCULATED.3IONS-SCNC: 6.6 MMOL/L (ref 5–15)
BASOPHILS # BLD AUTO: 0.01 10*3/MM3 (ref 0–0.2)
BASOPHILS NFR BLD AUTO: 0.1 % (ref 0–1.5)
BUN SERPL-MCNC: 20 MG/DL (ref 8–23)
BUN/CREAT SERPL: 30.3 (ref 7–25)
CALCIUM SPEC-SCNC: 7.5 MG/DL (ref 8.6–10.5)
CHLORIDE SERPL-SCNC: 108 MMOL/L (ref 98–107)
CO2 SERPL-SCNC: 25.4 MMOL/L (ref 22–29)
CREAT SERPL-MCNC: 0.66 MG/DL (ref 0.76–1.27)
DEPRECATED RDW RBC AUTO: 45.5 FL (ref 37–54)
EOSINOPHIL # BLD AUTO: 0.19 10*3/MM3 (ref 0–0.4)
EOSINOPHIL NFR BLD AUTO: 2.7 % (ref 0.3–6.2)
ERYTHROCYTE [DISTWIDTH] IN BLOOD BY AUTOMATED COUNT: 14.1 % (ref 12.3–15.4)
GFR SERPL CREATININE-BSD FRML MDRD: 120 ML/MIN/1.73
GLUCOSE BLDC GLUCOMTR-MCNC: 120 MG/DL (ref 70–130)
GLUCOSE BLDC GLUCOMTR-MCNC: 141 MG/DL (ref 70–130)
GLUCOSE BLDC GLUCOMTR-MCNC: 169 MG/DL (ref 70–130)
GLUCOSE SERPL-MCNC: 110 MG/DL (ref 65–99)
HCT VFR BLD AUTO: 19.6 % (ref 37.5–51)
HCT VFR BLD AUTO: 21.9 % (ref 37.5–51)
HCT VFR BLD AUTO: 23.2 % (ref 37.5–51)
HGB BLD-MCNC: 6.6 G/DL (ref 13–17.7)
HGB BLD-MCNC: 7.3 G/DL (ref 13–17.7)
HGB BLD-MCNC: 7.9 G/DL (ref 13–17.7)
IMM GRANULOCYTES # BLD AUTO: 0.06 10*3/MM3 (ref 0–0.05)
IMM GRANULOCYTES NFR BLD AUTO: 0.9 % (ref 0–0.5)
LYMPHOCYTES # BLD AUTO: 1.49 10*3/MM3 (ref 0.7–3.1)
LYMPHOCYTES NFR BLD AUTO: 21.4 % (ref 19.6–45.3)
MCH RBC QN AUTO: 30.4 PG (ref 26.6–33)
MCHC RBC AUTO-ENTMCNC: 33.7 G/DL (ref 31.5–35.7)
MCV RBC AUTO: 90.3 FL (ref 79–97)
MONOCYTES # BLD AUTO: 0.64 10*3/MM3 (ref 0.1–0.9)
MONOCYTES NFR BLD AUTO: 9.2 % (ref 5–12)
NEUTROPHILS NFR BLD AUTO: 4.58 10*3/MM3 (ref 1.7–7)
NEUTROPHILS NFR BLD AUTO: 65.7 % (ref 42.7–76)
NRBC BLD AUTO-RTO: 0.3 /100 WBC (ref 0–0.2)
PHOSPHATE SERPL-MCNC: 2.5 MG/DL (ref 2.5–4.5)
PLATELET # BLD AUTO: 89 10*3/MM3 (ref 140–450)
PMV BLD AUTO: 12.7 FL (ref 6–12)
POTASSIUM SERPL-SCNC: 3.9 MMOL/L (ref 3.5–5.2)
RBC # BLD AUTO: 2.17 10*6/MM3 (ref 4.14–5.8)
SODIUM SERPL-SCNC: 140 MMOL/L (ref 136–145)
WBC NRBC COR # BLD: 6.97 10*3/MM3 (ref 3.4–10.8)

## 2022-02-22 PROCEDURE — 99232 SBSQ HOSP IP/OBS MODERATE 35: CPT | Performed by: INTERNAL MEDICINE

## 2022-02-22 PROCEDURE — P9016 RBC LEUKOCYTES REDUCED: HCPCS

## 2022-02-22 PROCEDURE — 85018 HEMOGLOBIN: CPT | Performed by: INTERNAL MEDICINE

## 2022-02-22 PROCEDURE — 82962 GLUCOSE BLOOD TEST: CPT

## 2022-02-22 PROCEDURE — 80069 RENAL FUNCTION PANEL: CPT | Performed by: INTERNAL MEDICINE

## 2022-02-22 PROCEDURE — 63710000001 INSULIN LISPRO (HUMAN) PER 5 UNITS: Performed by: NURSE PRACTITIONER

## 2022-02-22 PROCEDURE — 85025 COMPLETE CBC W/AUTO DIFF WBC: CPT | Performed by: INTERNAL MEDICINE

## 2022-02-22 PROCEDURE — 86923 COMPATIBILITY TEST ELECTRIC: CPT

## 2022-02-22 PROCEDURE — 36430 TRANSFUSION BLD/BLD COMPNT: CPT

## 2022-02-22 PROCEDURE — 86900 BLOOD TYPING SEROLOGIC ABO: CPT

## 2022-02-22 PROCEDURE — 85014 HEMATOCRIT: CPT | Performed by: INTERNAL MEDICINE

## 2022-02-22 RX ADMIN — FOLIC ACID 1 MG: 1 TABLET ORAL at 09:12

## 2022-02-22 RX ADMIN — PANTOPRAZOLE SODIUM 8 MG/HR: 40 INJECTION, POWDER, FOR SOLUTION INTRAVENOUS at 08:08

## 2022-02-22 RX ADMIN — SODIUM CHLORIDE, PRESERVATIVE FREE 10 ML: 5 INJECTION INTRAVENOUS at 08:32

## 2022-02-22 RX ADMIN — Medication 100 MG: at 09:12

## 2022-02-22 RX ADMIN — PANTOPRAZOLE SODIUM 8 MG/HR: 40 INJECTION, POWDER, FOR SOLUTION INTRAVENOUS at 12:57

## 2022-02-22 RX ADMIN — PANTOPRAZOLE SODIUM 8 MG/HR: 40 INJECTION, POWDER, FOR SOLUTION INTRAVENOUS at 17:03

## 2022-02-22 RX ADMIN — PANTOPRAZOLE SODIUM 8 MG/HR: 40 INJECTION, POWDER, FOR SOLUTION INTRAVENOUS at 02:43

## 2022-02-22 RX ADMIN — INSULIN LISPRO 2 UNITS: 100 INJECTION, SOLUTION INTRAVENOUS; SUBCUTANEOUS at 17:02

## 2022-02-22 RX ADMIN — Medication 1 TABLET: at 09:12

## 2022-02-23 LAB
ALBUMIN SERPL-MCNC: 2.9 G/DL (ref 3.5–5.2)
ANION GAP SERPL CALCULATED.3IONS-SCNC: 8 MMOL/L (ref 5–15)
BH BB BLOOD EXPIRATION DATE: NORMAL
BH BB BLOOD TYPE BARCODE: 5100
BH BB DISPENSE STATUS: NORMAL
BH BB PRODUCT CODE: NORMAL
BH BB UNIT NUMBER: NORMAL
BUN SERPL-MCNC: 9 MG/DL (ref 8–23)
BUN/CREAT SERPL: 21.4 (ref 7–25)
CALCIUM SPEC-SCNC: 7.7 MG/DL (ref 8.6–10.5)
CHLORIDE SERPL-SCNC: 105 MMOL/L (ref 98–107)
CO2 SERPL-SCNC: 27 MMOL/L (ref 22–29)
CREAT SERPL-MCNC: 0.42 MG/DL (ref 0.76–1.27)
CROSSMATCH INTERPRETATION: NORMAL
DEPRECATED RDW RBC AUTO: 49.5 FL (ref 37–54)
ERYTHROCYTE [DISTWIDTH] IN BLOOD BY AUTOMATED COUNT: 14.3 % (ref 12.3–15.4)
GFR SERPL CREATININE-BSD FRML MDRD: >150 ML/MIN/1.73
GLUCOSE BLDC GLUCOMTR-MCNC: 103 MG/DL (ref 70–130)
GLUCOSE BLDC GLUCOMTR-MCNC: 106 MG/DL (ref 70–130)
GLUCOSE BLDC GLUCOMTR-MCNC: 128 MG/DL (ref 70–130)
GLUCOSE BLDC GLUCOMTR-MCNC: 131 MG/DL (ref 70–130)
GLUCOSE SERPL-MCNC: 90 MG/DL (ref 65–99)
HCT VFR BLD AUTO: 22.9 % (ref 37.5–51)
HCT VFR BLD AUTO: 23.9 % (ref 37.5–51)
HGB BLD-MCNC: 7.4 G/DL (ref 13–17.7)
HGB BLD-MCNC: 7.8 G/DL (ref 13–17.7)
MAGNESIUM SERPL-MCNC: 1.4 MG/DL (ref 1.6–2.4)
MAGNESIUM SERPL-MCNC: 2.4 MG/DL (ref 1.6–2.4)
MCH RBC QN AUTO: 31.1 PG (ref 26.6–33)
MCHC RBC AUTO-ENTMCNC: 32.6 G/DL (ref 31.5–35.7)
MCV RBC AUTO: 95.2 FL (ref 79–97)
PHOSPHATE SERPL-MCNC: 2.3 MG/DL (ref 2.5–4.5)
PHOSPHATE SERPL-MCNC: 2.9 MG/DL (ref 2.5–4.5)
PLATELET # BLD AUTO: 111 10*3/MM3 (ref 140–450)
PMV BLD AUTO: 12.1 FL (ref 6–12)
POTASSIUM SERPL-SCNC: 3.2 MMOL/L (ref 3.5–5.2)
POTASSIUM SERPL-SCNC: 4.1 MMOL/L (ref 3.5–5.2)
RBC # BLD AUTO: 2.51 10*6/MM3 (ref 4.14–5.8)
SODIUM SERPL-SCNC: 140 MMOL/L (ref 136–145)
UNIT  ABO: NORMAL
UNIT  RH: NORMAL
WBC NRBC COR # BLD: 9.67 10*3/MM3 (ref 3.4–10.8)

## 2022-02-23 PROCEDURE — 83735 ASSAY OF MAGNESIUM: CPT | Performed by: INTERNAL MEDICINE

## 2022-02-23 PROCEDURE — 85014 HEMATOCRIT: CPT | Performed by: INTERNAL MEDICINE

## 2022-02-23 PROCEDURE — 25010000002 MAGNESIUM SULFATE 2 GM/50ML SOLUTION: Performed by: INTERNAL MEDICINE

## 2022-02-23 PROCEDURE — 85027 COMPLETE CBC AUTOMATED: CPT | Performed by: INTERNAL MEDICINE

## 2022-02-23 PROCEDURE — 82962 GLUCOSE BLOOD TEST: CPT

## 2022-02-23 PROCEDURE — 85018 HEMOGLOBIN: CPT | Performed by: INTERNAL MEDICINE

## 2022-02-23 PROCEDURE — 80069 RENAL FUNCTION PANEL: CPT | Performed by: INTERNAL MEDICINE

## 2022-02-23 PROCEDURE — 99232 SBSQ HOSP IP/OBS MODERATE 35: CPT | Performed by: INTERNAL MEDICINE

## 2022-02-23 PROCEDURE — 84132 ASSAY OF SERUM POTASSIUM: CPT | Performed by: INTERNAL MEDICINE

## 2022-02-23 PROCEDURE — 84100 ASSAY OF PHOSPHORUS: CPT | Performed by: INTERNAL MEDICINE

## 2022-02-23 RX ORDER — POTASSIUM CHLORIDE 7.45 MG/ML
10 INJECTION INTRAVENOUS
Status: DISCONTINUED | OUTPATIENT
Start: 2022-02-23 | End: 2022-02-27

## 2022-02-23 RX ORDER — POTASSIUM CHLORIDE 750 MG/1
40 TABLET, FILM COATED, EXTENDED RELEASE ORAL AS NEEDED
Status: DISCONTINUED | OUTPATIENT
Start: 2022-02-23 | End: 2022-02-27

## 2022-02-23 RX ORDER — POTASSIUM CHLORIDE 1.5 G/1.77G
40 POWDER, FOR SOLUTION ORAL AS NEEDED
Status: DISCONTINUED | OUTPATIENT
Start: 2022-02-23 | End: 2022-02-27

## 2022-02-23 RX ORDER — MAGNESIUM SULFATE HEPTAHYDRATE 40 MG/ML
2 INJECTION, SOLUTION INTRAVENOUS AS NEEDED
Status: DISCONTINUED | OUTPATIENT
Start: 2022-02-23 | End: 2022-02-27

## 2022-02-23 RX ORDER — MAGNESIUM SULFATE HEPTAHYDRATE 40 MG/ML
4 INJECTION, SOLUTION INTRAVENOUS AS NEEDED
Status: DISCONTINUED | OUTPATIENT
Start: 2022-02-23 | End: 2022-02-27

## 2022-02-23 RX ADMIN — PANTOPRAZOLE SODIUM 8 MG/HR: 40 INJECTION, POWDER, FOR SOLUTION INTRAVENOUS at 08:28

## 2022-02-23 RX ADMIN — SODIUM CHLORIDE, PRESERVATIVE FREE 10 ML: 5 INJECTION INTRAVENOUS at 20:01

## 2022-02-23 RX ADMIN — MAGNESIUM SULFATE HEPTAHYDRATE 2 G: 2 INJECTION, SOLUTION INTRAVENOUS at 13:23

## 2022-02-23 RX ADMIN — MAGNESIUM SULFATE HEPTAHYDRATE 2 G: 2 INJECTION, SOLUTION INTRAVENOUS at 09:17

## 2022-02-23 RX ADMIN — SODIUM CHLORIDE, PRESERVATIVE FREE 10 ML: 5 INJECTION INTRAVENOUS at 09:30

## 2022-02-23 RX ADMIN — PANTOPRAZOLE SODIUM 8 MG/HR: 40 INJECTION, POWDER, FOR SOLUTION INTRAVENOUS at 20:01

## 2022-02-23 RX ADMIN — FOLIC ACID 1 MG: 1 TABLET ORAL at 08:09

## 2022-02-23 RX ADMIN — POTASSIUM CHLORIDE 40 MEQ: 750 TABLET, EXTENDED RELEASE ORAL at 11:40

## 2022-02-23 RX ADMIN — Medication 1 TABLET: at 08:09

## 2022-02-23 RX ADMIN — PANTOPRAZOLE SODIUM 8 MG/HR: 40 INJECTION, POWDER, FOR SOLUTION INTRAVENOUS at 04:00

## 2022-02-23 RX ADMIN — Medication 2 PACKET: at 09:16

## 2022-02-23 RX ADMIN — PANTOPRAZOLE SODIUM 8 MG/HR: 40 INJECTION, POWDER, FOR SOLUTION INTRAVENOUS at 13:39

## 2022-02-23 RX ADMIN — SODIUM CHLORIDE, PRESERVATIVE FREE 10 ML: 5 INJECTION INTRAVENOUS at 09:38

## 2022-02-23 RX ADMIN — Medication 100 MG: at 08:09

## 2022-02-23 RX ADMIN — MAGNESIUM SULFATE HEPTAHYDRATE 2 G: 2 INJECTION, SOLUTION INTRAVENOUS at 11:14

## 2022-02-23 RX ADMIN — POTASSIUM CHLORIDE 40 MEQ: 750 TABLET, EXTENDED RELEASE ORAL at 08:09

## 2022-02-24 LAB
ALBUMIN SERPL-MCNC: 2.8 G/DL (ref 3.5–5.2)
ANION GAP SERPL CALCULATED.3IONS-SCNC: 7.9 MMOL/L (ref 5–15)
BASOPHILS # BLD AUTO: 0.02 10*3/MM3 (ref 0–0.2)
BASOPHILS NFR BLD AUTO: 0.2 % (ref 0–1.5)
BUN SERPL-MCNC: 5 MG/DL (ref 8–23)
BUN/CREAT SERPL: 11.6 (ref 7–25)
CALCIUM SPEC-SCNC: 7.8 MG/DL (ref 8.6–10.5)
CHLORIDE SERPL-SCNC: 103 MMOL/L (ref 98–107)
CO2 SERPL-SCNC: 27.1 MMOL/L (ref 22–29)
CREAT SERPL-MCNC: 0.43 MG/DL (ref 0.76–1.27)
DEPRECATED RDW RBC AUTO: 46.3 FL (ref 37–54)
EOSINOPHIL # BLD AUTO: 0.21 10*3/MM3 (ref 0–0.4)
EOSINOPHIL NFR BLD AUTO: 2 % (ref 0.3–6.2)
ERYTHROCYTE [DISTWIDTH] IN BLOOD BY AUTOMATED COUNT: 14.2 % (ref 12.3–15.4)
GFR SERPL CREATININE-BSD FRML MDRD: >150 ML/MIN/1.73
GLUCOSE BLDC GLUCOMTR-MCNC: 102 MG/DL (ref 70–130)
GLUCOSE BLDC GLUCOMTR-MCNC: 114 MG/DL (ref 70–130)
GLUCOSE BLDC GLUCOMTR-MCNC: 94 MG/DL (ref 70–130)
GLUCOSE BLDC GLUCOMTR-MCNC: 97 MG/DL (ref 70–130)
GLUCOSE SERPL-MCNC: 99 MG/DL (ref 65–99)
HCT VFR BLD AUTO: 22.5 % (ref 37.5–51)
HGB BLD-MCNC: 7.6 G/DL (ref 13–17.7)
IMM GRANULOCYTES # BLD AUTO: 0.06 10*3/MM3 (ref 0–0.05)
IMM GRANULOCYTES NFR BLD AUTO: 0.6 % (ref 0–0.5)
LAB AP CASE REPORT: NORMAL
LYMPHOCYTES # BLD AUTO: 1.38 10*3/MM3 (ref 0.7–3.1)
LYMPHOCYTES NFR BLD AUTO: 13.2 % (ref 19.6–45.3)
MAGNESIUM SERPL-MCNC: 1.9 MG/DL (ref 1.6–2.4)
MCH RBC QN AUTO: 30.6 PG (ref 26.6–33)
MCHC RBC AUTO-ENTMCNC: 33.8 G/DL (ref 31.5–35.7)
MCV RBC AUTO: 90.7 FL (ref 79–97)
MONOCYTES # BLD AUTO: 1.31 10*3/MM3 (ref 0.1–0.9)
MONOCYTES NFR BLD AUTO: 12.5 % (ref 5–12)
NEUTROPHILS NFR BLD AUTO: 7.46 10*3/MM3 (ref 1.7–7)
NEUTROPHILS NFR BLD AUTO: 71.5 % (ref 42.7–76)
NRBC BLD AUTO-RTO: 0.2 /100 WBC (ref 0–0.2)
PATH REPORT.ADDENDUM SPEC: NORMAL
PATH REPORT.FINAL DX SPEC: NORMAL
PATH REPORT.GROSS SPEC: NORMAL
PHOSPHATE SERPL-MCNC: 2.8 MG/DL (ref 2.5–4.5)
PLATELET # BLD AUTO: 144 10*3/MM3 (ref 140–450)
PMV BLD AUTO: 11.9 FL (ref 6–12)
POTASSIUM SERPL-SCNC: 3.7 MMOL/L (ref 3.5–5.2)
RBC # BLD AUTO: 2.48 10*6/MM3 (ref 4.14–5.8)
SODIUM SERPL-SCNC: 138 MMOL/L (ref 136–145)
WBC NRBC COR # BLD: 10.44 10*3/MM3 (ref 3.4–10.8)

## 2022-02-24 PROCEDURE — 99232 SBSQ HOSP IP/OBS MODERATE 35: CPT | Performed by: PHYSICIAN ASSISTANT

## 2022-02-24 PROCEDURE — 80069 RENAL FUNCTION PANEL: CPT | Performed by: INTERNAL MEDICINE

## 2022-02-24 PROCEDURE — 82962 GLUCOSE BLOOD TEST: CPT

## 2022-02-24 PROCEDURE — 83735 ASSAY OF MAGNESIUM: CPT | Performed by: INTERNAL MEDICINE

## 2022-02-24 PROCEDURE — 85025 COMPLETE CBC W/AUTO DIFF WBC: CPT | Performed by: INTERNAL MEDICINE

## 2022-02-24 PROCEDURE — 94640 AIRWAY INHALATION TREATMENT: CPT

## 2022-02-24 RX ORDER — BUDESONIDE AND FORMOTEROL FUMARATE DIHYDRATE 160; 4.5 UG/1; UG/1
2 AEROSOL RESPIRATORY (INHALATION)
Status: DISCONTINUED | OUTPATIENT
Start: 2022-02-24 | End: 2022-02-27

## 2022-02-24 RX ADMIN — SODIUM CHLORIDE, PRESERVATIVE FREE 10 ML: 5 INJECTION INTRAVENOUS at 20:53

## 2022-02-24 RX ADMIN — SODIUM CHLORIDE, PRESERVATIVE FREE 10 ML: 5 INJECTION INTRAVENOUS at 09:24

## 2022-02-24 RX ADMIN — PANTOPRAZOLE SODIUM 8 MG/HR: 40 INJECTION, POWDER, FOR SOLUTION INTRAVENOUS at 20:53

## 2022-02-24 RX ADMIN — PANTOPRAZOLE SODIUM 8 MG/HR: 40 INJECTION, POWDER, FOR SOLUTION INTRAVENOUS at 01:27

## 2022-02-24 RX ADMIN — BUDESONIDE AND FORMOTEROL FUMARATE DIHYDRATE 2 PUFF: 160; 4.5 AEROSOL RESPIRATORY (INHALATION) at 20:40

## 2022-02-24 RX ADMIN — PANTOPRAZOLE SODIUM 8 MG/HR: 40 INJECTION, POWDER, FOR SOLUTION INTRAVENOUS at 06:14

## 2022-02-25 ENCOUNTER — TELEPHONE (OUTPATIENT)
Dept: GASTROENTEROLOGY | Facility: CLINIC | Age: 67
End: 2022-02-25

## 2022-02-25 LAB
ALBUMIN SERPL-MCNC: 2.7 G/DL (ref 3.5–5.2)
ANION GAP SERPL CALCULATED.3IONS-SCNC: 8.7 MMOL/L (ref 5–15)
BUN SERPL-MCNC: 7 MG/DL (ref 8–23)
BUN/CREAT SERPL: 13.2 (ref 7–25)
CALCIUM SPEC-SCNC: 8.5 MG/DL (ref 8.6–10.5)
CHLORIDE SERPL-SCNC: 105 MMOL/L (ref 98–107)
CO2 SERPL-SCNC: 28.3 MMOL/L (ref 22–29)
CREAT SERPL-MCNC: 0.53 MG/DL (ref 0.76–1.27)
FERRITIN SERPL-MCNC: 70.1 NG/ML (ref 30–400)
GFR SERPL CREATININE-BSD FRML MDRD: >150 ML/MIN/1.73
GLUCOSE BLDC GLUCOMTR-MCNC: 108 MG/DL (ref 70–130)
GLUCOSE BLDC GLUCOMTR-MCNC: 128 MG/DL (ref 70–130)
GLUCOSE BLDC GLUCOMTR-MCNC: 137 MG/DL (ref 70–130)
GLUCOSE BLDC GLUCOMTR-MCNC: 88 MG/DL (ref 70–130)
GLUCOSE SERPL-MCNC: 99 MG/DL (ref 65–99)
HCT VFR BLD AUTO: 23.5 % (ref 37.5–51)
HGB BLD-MCNC: 7.7 G/DL (ref 13–17.7)
IRON 24H UR-MRATE: 18 MCG/DL (ref 59–158)
IRON SATN MFR SERPL: 8 % (ref 20–50)
PHOSPHATE SERPL-MCNC: 3.5 MG/DL (ref 2.5–4.5)
POTASSIUM SERPL-SCNC: 4.5 MMOL/L (ref 3.5–5.2)
SODIUM SERPL-SCNC: 142 MMOL/L (ref 136–145)
TIBC SERPL-MCNC: 240 MCG/DL (ref 298–536)
TRANSFERRIN SERPL-MCNC: 161 MG/DL (ref 200–360)

## 2022-02-25 PROCEDURE — 99232 SBSQ HOSP IP/OBS MODERATE 35: CPT | Performed by: PHYSICIAN ASSISTANT

## 2022-02-25 PROCEDURE — 84466 ASSAY OF TRANSFERRIN: CPT | Performed by: PHYSICIAN ASSISTANT

## 2022-02-25 PROCEDURE — 83540 ASSAY OF IRON: CPT | Performed by: PHYSICIAN ASSISTANT

## 2022-02-25 PROCEDURE — 85014 HEMATOCRIT: CPT | Performed by: INTERNAL MEDICINE

## 2022-02-25 PROCEDURE — 85018 HEMOGLOBIN: CPT | Performed by: INTERNAL MEDICINE

## 2022-02-25 PROCEDURE — 94761 N-INVAS EAR/PLS OXIMETRY MLT: CPT

## 2022-02-25 PROCEDURE — 82728 ASSAY OF FERRITIN: CPT | Performed by: PHYSICIAN ASSISTANT

## 2022-02-25 PROCEDURE — 82962 GLUCOSE BLOOD TEST: CPT

## 2022-02-25 PROCEDURE — 94799 UNLISTED PULMONARY SVC/PX: CPT

## 2022-02-25 PROCEDURE — 80069 RENAL FUNCTION PANEL: CPT | Performed by: INTERNAL MEDICINE

## 2022-02-25 RX ADMIN — TIOTROPIUM BROMIDE INHALATION SPRAY 2 PUFF: 3.12 SPRAY, METERED RESPIRATORY (INHALATION) at 07:26

## 2022-02-25 RX ADMIN — SODIUM CHLORIDE, PRESERVATIVE FREE 10 ML: 5 INJECTION INTRAVENOUS at 20:06

## 2022-02-25 RX ADMIN — PANTOPRAZOLE SODIUM 8 MG/HR: 40 INJECTION, POWDER, FOR SOLUTION INTRAVENOUS at 10:29

## 2022-02-25 RX ADMIN — BUDESONIDE AND FORMOTEROL FUMARATE DIHYDRATE 2 PUFF: 160; 4.5 AEROSOL RESPIRATORY (INHALATION) at 07:24

## 2022-02-25 RX ADMIN — BUDESONIDE AND FORMOTEROL FUMARATE DIHYDRATE 2 PUFF: 160; 4.5 AEROSOL RESPIRATORY (INHALATION) at 20:40

## 2022-02-25 RX ADMIN — PANTOPRAZOLE SODIUM 8 MG/HR: 40 INJECTION, POWDER, FOR SOLUTION INTRAVENOUS at 12:45

## 2022-02-25 RX ADMIN — SODIUM CHLORIDE, PRESERVATIVE FREE 10 ML: 5 INJECTION INTRAVENOUS at 09:00

## 2022-02-25 RX ADMIN — PANTOPRAZOLE SODIUM 8 MG/HR: 40 INJECTION, POWDER, FOR SOLUTION INTRAVENOUS at 20:05

## 2022-02-25 RX ADMIN — PANTOPRAZOLE SODIUM 8 MG/HR: 40 INJECTION, POWDER, FOR SOLUTION INTRAVENOUS at 01:40

## 2022-02-25 NOTE — TELEPHONE ENCOUNTER
----- Message from Donald Bradford sent at 2/25/2022  1:02 PM EST -----  Regarding: Call Back  Contact: [phone]  PT is currently at the hospital. PT's wife called stating he is bleeding from his rectum and is turning pale. PT's wife would like to know if the doctor or the nurse would be stopping by to talk to he PT. PT's wife (Debra Sturgeon) requesting a call back.Please call: [phone]

## 2022-02-26 LAB
ALBUMIN SERPL-MCNC: 3.1 G/DL (ref 3.5–5.2)
ANION GAP SERPL CALCULATED.3IONS-SCNC: 7 MMOL/L (ref 5–15)
BASOPHILS # BLD AUTO: 0.02 10*3/MM3 (ref 0–0.2)
BASOPHILS NFR BLD AUTO: 0.3 % (ref 0–1.5)
BUN SERPL-MCNC: 8 MG/DL (ref 8–23)
BUN/CREAT SERPL: 12.5 (ref 7–25)
CALCIUM SPEC-SCNC: 8.6 MG/DL (ref 8.6–10.5)
CHLORIDE SERPL-SCNC: 103 MMOL/L (ref 98–107)
CO2 SERPL-SCNC: 29 MMOL/L (ref 22–29)
CREAT SERPL-MCNC: 0.64 MG/DL (ref 0.76–1.27)
DEPRECATED RDW RBC AUTO: 44 FL (ref 37–54)
EOSINOPHIL # BLD AUTO: 0.2 10*3/MM3 (ref 0–0.4)
EOSINOPHIL NFR BLD AUTO: 2.8 % (ref 0.3–6.2)
ERYTHROCYTE [DISTWIDTH] IN BLOOD BY AUTOMATED COUNT: 13.8 % (ref 12.3–15.4)
GFR SERPL CREATININE-BSD FRML MDRD: 124 ML/MIN/1.73
GLUCOSE BLDC GLUCOMTR-MCNC: 125 MG/DL (ref 70–130)
GLUCOSE BLDC GLUCOMTR-MCNC: 141 MG/DL (ref 70–130)
GLUCOSE BLDC GLUCOMTR-MCNC: 88 MG/DL (ref 70–130)
GLUCOSE BLDC GLUCOMTR-MCNC: 98 MG/DL (ref 70–130)
GLUCOSE SERPL-MCNC: 86 MG/DL (ref 65–99)
HCT VFR BLD AUTO: 23.9 % (ref 37.5–51)
HCT VFR BLD AUTO: 25.3 % (ref 37.5–51)
HGB BLD-MCNC: 8.2 G/DL (ref 13–17.7)
HGB BLD-MCNC: 8.6 G/DL (ref 13–17.7)
IMM GRANULOCYTES # BLD AUTO: 0.03 10*3/MM3 (ref 0–0.05)
IMM GRANULOCYTES NFR BLD AUTO: 0.4 % (ref 0–0.5)
LYMPHOCYTES # BLD AUTO: 1.34 10*3/MM3 (ref 0.7–3.1)
LYMPHOCYTES NFR BLD AUTO: 19 % (ref 19.6–45.3)
MAGNESIUM SERPL-MCNC: 1.7 MG/DL (ref 1.6–2.4)
MCH RBC QN AUTO: 30.9 PG (ref 26.6–33)
MCHC RBC AUTO-ENTMCNC: 34.3 G/DL (ref 31.5–35.7)
MCV RBC AUTO: 90.2 FL (ref 79–97)
MONOCYTES # BLD AUTO: 0.93 10*3/MM3 (ref 0.1–0.9)
MONOCYTES NFR BLD AUTO: 13.2 % (ref 5–12)
NEUTROPHILS NFR BLD AUTO: 4.53 10*3/MM3 (ref 1.7–7)
NEUTROPHILS NFR BLD AUTO: 64.3 % (ref 42.7–76)
NRBC BLD AUTO-RTO: 0 /100 WBC (ref 0–0.2)
PHOSPHATE SERPL-MCNC: 4.5 MG/DL (ref 2.5–4.5)
PLATELET # BLD AUTO: 254 10*3/MM3 (ref 140–450)
PMV BLD AUTO: 11.3 FL (ref 6–12)
POTASSIUM SERPL-SCNC: 4.1 MMOL/L (ref 3.5–5.2)
RBC # BLD AUTO: 2.65 10*6/MM3 (ref 4.14–5.8)
SODIUM SERPL-SCNC: 139 MMOL/L (ref 136–145)
WBC NRBC COR # BLD: 7.05 10*3/MM3 (ref 3.4–10.8)

## 2022-02-26 PROCEDURE — 85014 HEMATOCRIT: CPT | Performed by: INTERNAL MEDICINE

## 2022-02-26 PROCEDURE — 93005 ELECTROCARDIOGRAM TRACING: CPT | Performed by: INTERNAL MEDICINE

## 2022-02-26 PROCEDURE — 80069 RENAL FUNCTION PANEL: CPT | Performed by: INTERNAL MEDICINE

## 2022-02-26 PROCEDURE — 94664 DEMO&/EVAL PT USE INHALER: CPT

## 2022-02-26 PROCEDURE — 94799 UNLISTED PULMONARY SVC/PX: CPT

## 2022-02-26 PROCEDURE — 82962 GLUCOSE BLOOD TEST: CPT

## 2022-02-26 PROCEDURE — 93010 ELECTROCARDIOGRAM REPORT: CPT | Performed by: INTERNAL MEDICINE

## 2022-02-26 PROCEDURE — 85025 COMPLETE CBC W/AUTO DIFF WBC: CPT | Performed by: INTERNAL MEDICINE

## 2022-02-26 PROCEDURE — 85018 HEMOGLOBIN: CPT | Performed by: INTERNAL MEDICINE

## 2022-02-26 PROCEDURE — 99232 SBSQ HOSP IP/OBS MODERATE 35: CPT | Performed by: INTERNAL MEDICINE

## 2022-02-26 PROCEDURE — 94761 N-INVAS EAR/PLS OXIMETRY MLT: CPT

## 2022-02-26 PROCEDURE — 94762 N-INVAS EAR/PLS OXIMTRY CONT: CPT

## 2022-02-26 PROCEDURE — 83735 ASSAY OF MAGNESIUM: CPT | Performed by: INTERNAL MEDICINE

## 2022-02-26 RX ORDER — SUCRALFATE 1 G/1
1 TABLET ORAL
Status: DISCONTINUED | OUTPATIENT
Start: 2022-02-26 | End: 2022-02-27 | Stop reason: HOSPADM

## 2022-02-26 RX ORDER — PANTOPRAZOLE SODIUM 40 MG/1
40 TABLET, DELAYED RELEASE ORAL
Status: DISCONTINUED | OUTPATIENT
Start: 2022-02-26 | End: 2022-02-27 | Stop reason: HOSPADM

## 2022-02-26 RX ADMIN — BUDESONIDE AND FORMOTEROL FUMARATE DIHYDRATE 2 PUFF: 160; 4.5 AEROSOL RESPIRATORY (INHALATION) at 09:13

## 2022-02-26 RX ADMIN — PANTOPRAZOLE SODIUM 40 MG: 40 TABLET, DELAYED RELEASE ORAL at 18:02

## 2022-02-26 RX ADMIN — SODIUM CHLORIDE, PRESERVATIVE FREE 10 ML: 5 INJECTION INTRAVENOUS at 11:06

## 2022-02-26 RX ADMIN — PANTOPRAZOLE SODIUM 8 MG/HR: 40 INJECTION, POWDER, FOR SOLUTION INTRAVENOUS at 01:05

## 2022-02-26 RX ADMIN — SUCRALFATE 1 G: 1 TABLET ORAL at 18:02

## 2022-02-26 RX ADMIN — SODIUM CHLORIDE, PRESERVATIVE FREE 10 ML: 5 INJECTION INTRAVENOUS at 09:00

## 2022-02-26 RX ADMIN — PANTOPRAZOLE SODIUM 8 MG/HR: 40 INJECTION, POWDER, FOR SOLUTION INTRAVENOUS at 06:03

## 2022-02-26 RX ADMIN — TIOTROPIUM BROMIDE INHALATION SPRAY 2 PUFF: 3.12 SPRAY, METERED RESPIRATORY (INHALATION) at 09:13

## 2022-02-26 RX ADMIN — SODIUM CHLORIDE, PRESERVATIVE FREE 10 ML: 5 INJECTION INTRAVENOUS at 20:00

## 2022-02-26 RX ADMIN — BUDESONIDE AND FORMOTEROL FUMARATE DIHYDRATE 2 PUFF: 160; 4.5 AEROSOL RESPIRATORY (INHALATION) at 21:15

## 2022-02-27 ENCOUNTER — READMISSION MANAGEMENT (OUTPATIENT)
Dept: CALL CENTER | Facility: HOSPITAL | Age: 67
End: 2022-02-27

## 2022-02-27 VITALS
DIASTOLIC BLOOD PRESSURE: 60 MMHG | RESPIRATION RATE: 18 BRPM | HEART RATE: 60 BPM | BODY MASS INDEX: 25.53 KG/M2 | TEMPERATURE: 97.8 F | OXYGEN SATURATION: 97 % | HEIGHT: 70 IN | SYSTOLIC BLOOD PRESSURE: 106 MMHG | WEIGHT: 178.3 LBS

## 2022-02-27 LAB
BASOPHILS # BLD AUTO: 0.03 10*3/MM3 (ref 0–0.2)
BASOPHILS NFR BLD AUTO: 0.4 % (ref 0–1.5)
DEPRECATED RDW RBC AUTO: 45.1 FL (ref 37–54)
EOSINOPHIL # BLD AUTO: 0.25 10*3/MM3 (ref 0–0.4)
EOSINOPHIL NFR BLD AUTO: 3.5 % (ref 0.3–6.2)
ERYTHROCYTE [DISTWIDTH] IN BLOOD BY AUTOMATED COUNT: 13.9 % (ref 12.3–15.4)
GLUCOSE BLDC GLUCOMTR-MCNC: 114 MG/DL (ref 70–130)
GLUCOSE BLDC GLUCOMTR-MCNC: 98 MG/DL (ref 70–130)
HCT VFR BLD AUTO: 24.9 % (ref 37.5–51)
HGB BLD-MCNC: 8.3 G/DL (ref 13–17.7)
IMM GRANULOCYTES # BLD AUTO: 0.06 10*3/MM3 (ref 0–0.05)
IMM GRANULOCYTES NFR BLD AUTO: 0.8 % (ref 0–0.5)
LYMPHOCYTES # BLD AUTO: 1.68 10*3/MM3 (ref 0.7–3.1)
LYMPHOCYTES NFR BLD AUTO: 23.8 % (ref 19.6–45.3)
MCH RBC QN AUTO: 30.2 PG (ref 26.6–33)
MCHC RBC AUTO-ENTMCNC: 33.3 G/DL (ref 31.5–35.7)
MCV RBC AUTO: 90.5 FL (ref 79–97)
MONOCYTES # BLD AUTO: 0.89 10*3/MM3 (ref 0.1–0.9)
MONOCYTES NFR BLD AUTO: 12.6 % (ref 5–12)
NEUTROPHILS NFR BLD AUTO: 4.16 10*3/MM3 (ref 1.7–7)
NEUTROPHILS NFR BLD AUTO: 58.9 % (ref 42.7–76)
NRBC BLD AUTO-RTO: 0 /100 WBC (ref 0–0.2)
PLATELET # BLD AUTO: 304 10*3/MM3 (ref 140–450)
PMV BLD AUTO: 10.9 FL (ref 6–12)
RBC # BLD AUTO: 2.75 10*6/MM3 (ref 4.14–5.8)
WBC NRBC COR # BLD: 7.07 10*3/MM3 (ref 3.4–10.8)

## 2022-02-27 PROCEDURE — 99232 SBSQ HOSP IP/OBS MODERATE 35: CPT | Performed by: INTERNAL MEDICINE

## 2022-02-27 PROCEDURE — 85025 COMPLETE CBC W/AUTO DIFF WBC: CPT | Performed by: INTERNAL MEDICINE

## 2022-02-27 PROCEDURE — 94799 UNLISTED PULMONARY SVC/PX: CPT

## 2022-02-27 PROCEDURE — 94761 N-INVAS EAR/PLS OXIMETRY MLT: CPT

## 2022-02-27 PROCEDURE — 82962 GLUCOSE BLOOD TEST: CPT

## 2022-02-27 RX ORDER — FERROUS SULFATE 325(65) MG
325 TABLET ORAL EVERY OTHER DAY
Qty: 15 TABLET | Refills: 3 | Status: SHIPPED | OUTPATIENT
Start: 2022-02-28 | End: 2022-05-04

## 2022-02-27 RX ORDER — SUCRALFATE 1 G/1
1 TABLET ORAL
Qty: 60 TABLET | Refills: 3 | Status: SHIPPED | OUTPATIENT
Start: 2022-02-27 | End: 2022-04-13 | Stop reason: HOSPADM

## 2022-02-27 RX ORDER — PANTOPRAZOLE SODIUM 40 MG/1
40 TABLET, DELAYED RELEASE ORAL
Qty: 60 TABLET | Refills: 3 | Status: SHIPPED | OUTPATIENT
Start: 2022-02-27 | End: 2022-07-19 | Stop reason: SDUPTHER

## 2022-02-27 RX ORDER — FERROUS SULFATE 325(65) MG
325 TABLET ORAL EVERY OTHER DAY
Status: DISCONTINUED | OUTPATIENT
Start: 2022-02-28 | End: 2022-02-27 | Stop reason: HOSPADM

## 2022-02-27 RX ORDER — FERROUS SULFATE 325(65) MG
325 TABLET ORAL
Status: DISCONTINUED | OUTPATIENT
Start: 2022-02-27 | End: 2022-02-27

## 2022-02-27 RX ADMIN — SUCRALFATE 1 G: 1 TABLET ORAL at 06:30

## 2022-02-27 RX ADMIN — PANTOPRAZOLE SODIUM 40 MG: 40 TABLET, DELAYED RELEASE ORAL at 06:30

## 2022-02-27 RX ADMIN — TIOTROPIUM BROMIDE INHALATION SPRAY 2 PUFF: 3.12 SPRAY, METERED RESPIRATORY (INHALATION) at 07:59

## 2022-02-27 RX ADMIN — BUDESONIDE AND FORMOTEROL FUMARATE DIHYDRATE 2 PUFF: 160; 4.5 AEROSOL RESPIRATORY (INHALATION) at 07:59

## 2022-02-27 NOTE — OUTREACH NOTE
Prep Survey      Responses   [facility] patient discharged from? [place]   Is LACE score < 7 ? No   Emergency Room discharge w/ pulse ox? No   Eligibility [facility]   Date of Admission 02/20/22   Date of Discharge 02/27/22   Discharge Disposition Home or Self Care   Discharge diagnosis Gastrointestinal hemorrhage with melena,  EGD   Does the patient have one of the following disease processes/diagnoses(primary or secondary)? Other   Does the patient have Home health ordered? No   Is there a DME ordered? No   Prep survey completed? Yes          Zulma Rayo RN

## 2022-02-28 ENCOUNTER — TRANSITIONAL CARE MANAGEMENT TELEPHONE ENCOUNTER (OUTPATIENT)
Dept: CALL CENTER | Facility: HOSPITAL | Age: 67
End: 2022-02-28

## 2022-02-28 LAB — QT INTERVAL: 413 MS

## 2022-02-28 NOTE — OUTREACH NOTE
Call Center TCM Note      Responses   [facility] patient discharged from? [place]   Does the patient have one of the following disease processes/diagnoses(primary or secondary)? Other   TCM attempt successful? No   Unsuccessful attempts Attempt 2          Dana Patel MA    2/28/2022, 16:43 EST

## 2022-02-28 NOTE — OUTREACH NOTE
Call Center TCM Note      Responses   [facility] patient discharged from? [place]   Does the patient have one of the following disease processes/diagnoses(primary or secondary)? Other   TCM attempt successful? No   Unsuccessful attempts Attempt 1          Dana Patel MA    2/28/2022, 15:25 EST

## 2022-03-01 ENCOUNTER — HOSPITAL ENCOUNTER (OUTPATIENT)
Dept: CT IMAGING | Facility: HOSPITAL | Age: 67
Discharge: HOME | End: 2022-03-01
Admitting: INTERNAL MEDICINE

## 2022-03-01 ENCOUNTER — TRANSITIONAL CARE MANAGEMENT TELEPHONE ENCOUNTER (OUTPATIENT)
Dept: CALL CENTER | Facility: HOSPITAL | Age: 67
End: 2022-03-01

## 2022-03-01 DIAGNOSIS — R07.9 CHEST PAIN, UNSPECIFIED TYPE: ICD-10-CM

## 2022-03-01 DIAGNOSIS — R06.02 SOB (SHORTNESS OF BREATH): ICD-10-CM

## 2022-03-01 PROCEDURE — 71250 CT THORAX DX C-: CPT

## 2022-03-01 NOTE — OUTREACH NOTE
Call Center TCM Note      Responses   [facility] patient discharged from? [place]   Does the patient have one of the following disease processes/diagnoses(primary or secondary)? Other   TCM attempt successful? No   Unsuccessful attempts Attempt 3   Wrap up additional comments Unable to reach pt x 3 attempts for TCM call. Pt is sched for TCM [facility] with PCP Dr Tyler tomorrow 03/02/2022.          Dana Patel MA    3/1/2022, 16:07 EST

## 2022-03-02 ENCOUNTER — OFFICE VISIT (OUTPATIENT)
Dept: INTERNAL MEDICINE | Age: 67
End: 2022-03-02

## 2022-03-02 VITALS
WEIGHT: 181 LBS | SYSTOLIC BLOOD PRESSURE: 122 MMHG | BODY MASS INDEX: 25.91 KG/M2 | TEMPERATURE: 97.3 F | DIASTOLIC BLOOD PRESSURE: 70 MMHG | HEART RATE: 80 BPM | OXYGEN SATURATION: 98 % | HEIGHT: 70 IN

## 2022-03-02 DIAGNOSIS — D62 ACUTE POST-HEMORRHAGIC ANEMIA: ICD-10-CM

## 2022-03-02 DIAGNOSIS — K26.9 DUODENAL ULCER: Primary | ICD-10-CM

## 2022-03-02 DIAGNOSIS — K92.2 UGI BLEED: ICD-10-CM

## 2022-03-02 PROCEDURE — 1111F DSCHRG MED/CURRENT MED MERGE: CPT | Performed by: INTERNAL MEDICINE

## 2022-03-02 PROCEDURE — 99495 TRANSJ CARE MGMT MOD F2F 14D: CPT | Performed by: INTERNAL MEDICINE

## 2022-03-02 NOTE — PROGRESS NOTES
"    I N T E R N A L  M E D I C I N E  J U N O H  K I M,  M D      ENCOUNTER DATE:  03/02/2022    Mark L Sturgeon / 66 y.o. / male        CC:   (Transitional Care Follow Up Visit)  Transitional Care- Gastrointestinal hemorrhage with melena (2/20/2022 - 2/27/2022 , 2/27/2022 to 3/1/2022)        Within 48 business hours after discharge our office contacted him via telephone to coordinate his care and needs.      I reviewed and discussed the details of that call along with the discharge summary, hospital problems, inpatient lab results, inpatient diagnostic studies, and consultation reports with the patient.     Date of TCM Phone Call 2/27/2022   [facility]   Date of Admission 2/20/2022   Date of Discharge 2/27/2022   Discharge Disposition Home or Self Care       Risk for Readmission (LACE) Score: 11 (2/27/2022  6:00 AM)            VITALS    Visit Vitals  /70 (BP Location: Left arm)   Pulse 80   Temp 97.3 °F (36.3 °C)   Ht 177.8 cm (70\")   Wt 82.1 kg (181 lb)   SpO2 98%   BMI 25.97 kg/m²       BP Readings from Last 3 Encounters:   03/02/22 122/70   02/27/22 106/60   11/29/21 130/72     Wt Readings from Last 3 Encounters:   03/02/22 82.1 kg (181 lb)   02/27/22 80.9 kg (178 lb 4.8 oz)   11/29/21 83 kg (183 lb)      Body mass index is 25.97 kg/m².    HPI:     Date of admission/discharge: As noted above in CC  Hospital: [facility]  Principle Dx: UGI bleeding from duodenal ulcer; severe anemia due to hemorrhage   Secondary Dx: COPD and pulmonary fibrosis  History prior to hospitalization: Was taking ASA 81 mg 3-4 days a week for history of nonobstructive CAD. Recently started taking prescription NSAID started by orthopedist. Presented with severe weakness, lightheadedness and melena.   Evaluation/Treatment: Hgb was 5.8. EGD showed duodenal ulcer. Needed PRBC transfusion.   Course: Denies ongoing melena or any blood in stool. Denies abdominal pain. Has stopped ASA, NSAID and fish " oil.     Patient Care Team:  Edmundo Tyler MD as PCP - General (Internal Medicine)  Baylee Galaviz MD as Consulting Physician (Pulmonary Disease)  ____________________________________________________________________    ASSESSMENT & PLAN:    1. Duodenal ulcer    2. UGI bleed    3. Acute post-hemorrhagic anemia      No orders of the defined types were placed in this encounter.      Summary/Discussion:  • Continue to hold aspirin.  Avoid any NSAIDs.  Continue to hold fish oil.  • Advised him to not drink alcohol or smoke  • Has upcoming follow-up with GI.  Will need surveillance EGD.  • No clinical evidence of continuing or recurrent bleeding.  • Continue iron  • Continue pantoprazole  • May continue losartan and rosuvastatin  • Discontinue Metformin at this time due to ongoing weight loss and improved A1c    Return in about 2 months (around 5/2/2022) for Reassess chronic medical problems.    ____________________________________________________________________    REVIEW OF SYSTEMS    Review of Systems  Denies chest pain   Denies worsening shortness of breath  + fatigue  No abdominal pain, melena or blood in stool     PHYSICAL EXAMINATION    Physical Exam  Constitutional:       General: He is not in acute distress.     Appearance: He is not ill-appearing.   Cardiovascular:      Rate and Rhythm: Normal rate and regular rhythm.   Pulmonary:      Effort: Pulmonary effort is normal.      Breath sounds: No wheezing or rales.   Abdominal:      General: Abdomen is flat.      Tenderness: There is no abdominal tenderness.   Skin:     Coloration: Skin is not jaundiced. Pallor: mild.   Neurological:      Mental Status: He is alert.           REVIEWED DATA:    Labs:   Lab Results   Component Value Date     02/26/2022    K 4.1 02/26/2022    CALCIUM 8.6 02/26/2022    AST 11 02/20/2022    ALT 8 02/20/2022    BUN 8 02/26/2022    CREATININE 0.64 (L) 02/26/2022    CREATININE 0.53 (L) 02/25/2022    CREATININE 0.43 (L) 02/24/2022     EGFRIFNONA 124 02/26/2022    EGFRIFAFRI 127 08/26/2021       Lab Results   Component Value Date    WBC 7.07 02/27/2022    HGB 8.3 (L) 02/27/2022    HGB 8.6 (L) 02/26/2022    HGB 8.2 (L) 02/26/2022     02/27/2022     Lab Results   Component Value Date    FERRITIN 70.10 02/25/2022    FERRITIN 37.40 02/20/2022     Lab Results   Component Value Date    IRON 18 (L) 02/25/2022    IRON 89 02/20/2022      Lab Results   Component Value Date    TIBC 240 (L) 02/25/2022    TIBC 247 (L) 02/20/2022       Lab Results   Component Value Date    PROTEIN Negative 08/26/2021    GLUCOSEU Negative 08/26/2021    BLOODU Negative 08/26/2021    NITRITEU Negative 08/26/2021    LEUKOCYTESUR Trace (A) 08/26/2021       Imaging:   CT Angiogram Abdomen Pelvis    Result Date: 2/22/2022  Narrative: CT ANGIOGRAM OF THE ABDOMEN AND PELVIS WITH AND WITHOUT CONTRAST  HISTORY: GI bleed.  TECHNIQUE: Axial CT images of the abdomen and pelvis were obtained in the angiographic phase following administration of intravenous contrast. Coronal sagittal and 3-D volume rendered images were then obtained.  COMPARISON: CT abdomen and pelvis from 09/02/2021.  FINDINGS: The aorta is well opacified with intravenous contrast. Ostial calcification at the origin of the celiac and the SMA with moderate luminal narrowing of the celiac. On today's examination there is a blush of contrast seen within the second portion of the duodenum, image 67 series 3, that is most suggestive of active extravasation. Otherwise, the small and large bowel loops demonstrate normal caliber without any other suspicious site of extravasation. There is a replaced right hepatic artery arising from the superior mesenteric artery. There are 2 right renal arteries present. There is dilatation of the distal abdominal aorta that measures up to 2.7 cm. Bilateral common iliac arteries are mildly ectatic measuring 1.4 cm on the right and approximately 1 cm on the left.  Angiographic phase evaluation  of the liver, gallbladder, spleen and pancreas is normal. Low-density left adrenal gland nodule most suggestive of an adenoma. Small right renal calculus is present. Two right renal cysts. The urinary bladder is partially distended and normal. Colonic diverticulosis is present. Appendix is normal. No pathological retroperitoneal lymphadenopathy. Evaluation of the lower lung fields is limited secondary to motion.      Impression: 1. Blush of contrast seen within the second portion of the duodenum most suggestive of active extravasation. These findings were communicated at the time of dictation with Dr. Lion by telephone. 2. Colonic diverticulosis. 3. Additional findings as above.  Radiation dose reduction techniques were utilized, including automated exposure control and exposure modulation based on body size.  This report was finalized on 2/22/2022 12:15 PM by Dr. Randy Riggins M.D.      CT Chest Hi Resolution Diagnostic    Result Date: 3/2/2022  Narrative: CT CHEST HI RESOLUTION DIAGNOSTIC-  CLINICAL HISTORY: Chest pain  TECHNIQUE: Spiral CT images were obtained through the chest without IV contrast and were reconstructed in 3 mm thick slices. Images were obtained at inspiration and expiration. Subsequently, the patient was placed in prone position and additional 1 mm thick high-resolution images of the lung parenchyma were acquired.  Radiation dose reduction techniques were utilized, including automated exposure control and exposure modulation based on body size.  COMPARISON: CT chest dated 09/02/2021.  FINDINGS: There is minimal patchy subpleural reticular interstitial thickening scattered throughout both lungs that is more prominent on the right than the left. This appears unchanged when differences in technique are accounted for and is consistent with minimal nonspecific pulmonary fibrosis. The overall pattern of involvement is not compatible with UIP. There is no honeycombing. There is no bronchiectasis. No  emphysematous changes are identified. There are no groundglass opacities. No discrete lung masses are evident. Images obtained at expiration show no air trapping. There is no mediastinal or hilar or axillary adenopathy. No pleural effusions are present. Images through the upper abdomen show no significant abnormality.      Impression: Minimal patchy pulmonary fibrosis showing no significant change since 09/20/2021. Otherwise unremarkable CT scan of the chest.  This report was finalized on 3/2/2022 10:39 AM by Dr. Milo Uribe M.D.         Medical Tests:     EGD 2/21/22:   No gross lesions in esophagus.  - Erythematous mucosa in the stomach. Biopsied.  - Non-bleeding duodenal ulcer with pigmented material. Injected. Treated with bipolar cautery.    Summary of old records / correspondence / consultant report:   DC summary re: issues addressed on HPI    Request outside records:         MEDICATIONS   Current Outpatient Medications   Medication Sig Dispense Refill   • Blood Glucose Monitoring Suppl (BLOOD GLUCOSE MONITOR SYSTEM) w/Device kit 1 kit 2 (Two) Times a Day. 1 each 12   • Blood Glucose Monitoring Suppl (ONETOUCH VERIO) w/Device kit 1 kit Daily. 1 kit 0   • Cholecalciferol (VITAMIN D PO) Take 1 tablet by mouth Daily.     • Cyanocobalamin (VITAMIN B-12 PO) Take 1 tablet by mouth Daily.     • ferrous sulfate 325 (65 FE) MG tablet Take 1 tablet by mouth Every Other Day. 15 tablet 3   • glucose blood (ONETOUCH VERIO) test strip Give patient what insurance cover . 100 each 11   • Lancets (ONETOUCH ULTRASOFT) lancets Use to test glucose once daily.  Dx DM2  E11.9 100 each 11   • Multiple Vitamins-Minerals (MULTIVITAMIN PO) Take  by mouth.     • Omega-3 Fatty Acids (FISH OIL) 1000 MG capsule capsule Take  by mouth.     • pantoprazole (PROTONIX) 40 MG EC tablet Take 1 tablet by mouth 2 (Two) Times a Day Before Meals. 60 tablet 3   • sucralfate (CARAFATE) 1 g tablet Take 1 tablet by mouth 2 (Two) Times a Day Before  Meals. 60 tablet 3   • vitamin C (ASCORBIC ACID) 500 MG tablet Take 500 mg by mouth Daily.     • losartan (COZAAR) 25 MG tablet TAKE 1 TABLET BY MOUTH DAILY 90 tablet 3   • rosuvastatin (CRESTOR) 10 MG tablet TAKE 1 TABLET BY MOUTH EVERY NIGHT 90 tablet 3   • Tiotropium Bromide-Olodaterol (STIOLTO RESPIMAT IN) Inhale. 2 puff daily       No current facility-administered medications for this visit.       Current outpatient and discharge medications have been reconciled for the patient.  Reviewed by: Edmundo Tyler MD         Examiner was wearing KN95 mask and exam gloves during the entire duration of the visit. Patient was masked the entire time.   Minimum social distance of 6 ft maintained entire visit except if physical contact was necessary as documented.

## 2022-03-02 NOTE — PATIENT INSTRUCTIONS
** IMPORTANT MESSAGE FROM DR. BRICEÑO **    In our office, your satisfaction is VERY important to us.     You may receive a survey from [employer] by mail or E-mail for you to provide feedback about your visit. This information is invaluable for me to know what we can do to improve our services.     I ask that you please take a few minutes to complete the survey and let us know how we are doing in serving your needs. (You may receive the survey more than once for multiple visits)    Thank You !    Dr. Briceño    _________________________________________________________________________________________________________________________      ** ADDITIONAL INSTRUCTION / REMINDERS FROM DR. BRICEÑO **    Discontinue metformin.  Continue to hold aspirin and fish oil.   Avoid alcohol and smoking.   Follow-up with GI.

## 2022-03-03 ENCOUNTER — TELEPHONE (OUTPATIENT)
Dept: GASTROENTEROLOGY | Facility: CLINIC | Age: 67
End: 2022-03-03

## 2022-03-03 ENCOUNTER — OFFICE VISIT (OUTPATIENT)
Dept: GASTROENTEROLOGY | Facility: CLINIC | Age: 67
End: 2022-03-03

## 2022-03-03 VITALS — WEIGHT: 181.8 LBS | BODY MASS INDEX: 25.45 KG/M2 | TEMPERATURE: 97.5 F | HEIGHT: 71 IN

## 2022-03-03 DIAGNOSIS — D62 ACUTE BLOOD LOSS ANEMIA: ICD-10-CM

## 2022-03-03 DIAGNOSIS — K26.9 DUODENAL ULCER: Primary | ICD-10-CM

## 2022-03-03 PROCEDURE — 99214 OFFICE O/P EST MOD 30 MIN: CPT | Performed by: PHYSICIAN ASSISTANT

## 2022-03-03 NOTE — PROGRESS NOTES
"Chief Complaint  GI Bleeding    Subjective          History of Present Illness    Mark L Sturgeon is a  66 y.o. male presents for hospital follow-up admitted from 2/20 through 2/27/2022 for melena and duodenal ulcer. He does have history of H. pylori treated several years ago with Prevpac. He is a patient of Dr. Lawson.    Hospital discharge note:  \"with lightheadedness, fatigue and black tarry stools.  He was admitted to the intensive care unit.  He was found to have GI bleed.  He was seen by GI in consultation.  They performed endoscopy showing duodenal ulcer.  He was started on Protonix and sucralfate.  He was advised to stop taking NSAIDs, consuming alcohol and smoking cigarettes.  His hemoglobin has remained stable after transfusion of packed cells.  He did have a brief run of wide-complex tachycardia but twelve-lead EKG and electrolytes were unremarkable.  It did not recur and the patient remained asymptomatic.  He has maximally benefited from acute inpatient care.  He has to follow-up with his primary care physician within 1 to 2 weeks.  Follow-up with GI within 8 weeks for discussion of whether he needs follow-up EGD to verify healing of his ulcer.\"  -------------------------------------------------------------------------------------------    He has continued the pantoprazole twice daily and is on oral iron supplementation 1 every other day. No further problems.  He denies abdominal pain, nausea, vomiting, dyspepsia, GERD, trouble swallowing, melena, hematochezia, diarrhea, constipation, shortness of air, chest pain, lightheadedness.    Labs at discharge on 2/27 showed hemoglobin 8.3 which was stable.      Objective   Vital Signs:   Temp 97.5 °F (36.4 °C)   Ht 180.3 cm (71\")   Wt 82.5 kg (181 lb 12.8 oz)   BMI 25.36 kg/m²       Physical Exam  Vitals reviewed.   Constitutional:       General: He is awake. He is not in acute distress.     Appearance: Normal appearance. He is well-developed and " well-groomed.   HENT:      Head: Normocephalic.   Pulmonary:      Effort: Pulmonary effort is normal. No respiratory distress.   Skin:     Coloration: Skin is not pale.   Neurological:      Mental Status: He is alert and oriented to person, place, and time.      Gait: Gait is intact.   Psychiatric:         Mood and Affect: Mood and affect normal.         Speech: Speech normal.         Behavior: Behavior is cooperative.         Judgment: Judgment normal.          Result Review :             Assessment and Plan    Diagnoses and all orders for this visit:    1. Duodenal ulcer (Primary)  -     CBC & Differential  -     Iron Profile  -     Ferritin  -     Case Request; Standing  -     Case Request    2. Acute blood loss anemia  -     CBC & Differential  -     Iron Profile  -     Ferritin  -     Case Request; Standing  -     Case Request    We will recheck labs today including iron profile.  Continue PPI and oral iron.    We will schedule EGD in 6 weeks with Dr. Lawson to reevaluate.    Follow Up   Return for EGD.    Dragon dictation used throughout this note.     Gertrudis Escobar PA-C

## 2022-03-03 NOTE — TELEPHONE ENCOUNTER
DUANE patient in office for EGD. Scheduled 04/13/2022 with arrival time 10:00am. Prep packet handed to patient. Also advised arrival time may vary based on [facility] guidelines. DUANE Lopez

## 2022-03-04 ENCOUNTER — TELEPHONE (OUTPATIENT)
Dept: GASTROENTEROLOGY | Facility: CLINIC | Age: 67
End: 2022-03-04

## 2022-03-04 LAB
BASOPHILS # BLD AUTO: 0.1 X10E3/UL (ref 0–0.2)
BASOPHILS NFR BLD AUTO: 1 %
EOSINOPHIL # BLD AUTO: 0.3 X10E3/UL (ref 0–0.4)
EOSINOPHIL NFR BLD AUTO: 3 %
ERYTHROCYTE [DISTWIDTH] IN BLOOD BY AUTOMATED COUNT: 13.7 % (ref 11.6–15.4)
FERRITIN SERPL-MCNC: 35 NG/ML (ref 30–400)
HCT VFR BLD AUTO: 29.5 % (ref 37.5–51)
HGB BLD-MCNC: 9.3 G/DL (ref 13–17.7)
IMM GRANULOCYTES # BLD AUTO: 0.1 X10E3/UL (ref 0–0.1)
IMM GRANULOCYTES NFR BLD AUTO: 1 %
IRON SATN MFR SERPL: 63 % (ref 15–55)
IRON SERPL-MCNC: 190 UG/DL (ref 38–169)
LYMPHOCYTES # BLD AUTO: 1.9 X10E3/UL (ref 0.7–3.1)
LYMPHOCYTES NFR BLD AUTO: 24 %
MCH RBC QN AUTO: 29.7 PG (ref 26.6–33)
MCHC RBC AUTO-ENTMCNC: 31.5 G/DL (ref 31.5–35.7)
MCV RBC AUTO: 94 FL (ref 79–97)
MONOCYTES # BLD AUTO: 0.8 X10E3/UL (ref 0.1–0.9)
MONOCYTES NFR BLD AUTO: 10 %
NEUTROPHILS # BLD AUTO: 4.8 X10E3/UL (ref 1.4–7)
NEUTROPHILS NFR BLD AUTO: 61 %
PLATELET # BLD AUTO: 529 X10E3/UL (ref 150–450)
RBC # BLD AUTO: 3.13 X10E6/UL (ref 4.14–5.8)
TIBC SERPL-MCNC: 301 UG/DL (ref 250–450)
UIBC SERPL-MCNC: 111 UG/DL (ref 111–343)
WBC # BLD AUTO: 7.9 X10E3/UL (ref 3.4–10.8)

## 2022-03-04 NOTE — TELEPHONE ENCOUNTER
----- Message from Gertrudis Escobar PA-C sent at 3/4/2022 11:59 AM EST -----  Can you please call patient and let him know that his iron profile is much better.  He actually has too much iron in his system now.  His hemoglobin is improved to 9.3 which is not normal but much better.  I would have him stop the oral iron now.

## 2022-03-08 ENCOUNTER — READMISSION MANAGEMENT (OUTPATIENT)
Dept: CALL CENTER | Facility: HOSPITAL | Age: 67
End: 2022-03-08

## 2022-03-08 ENCOUNTER — TRANSCRIBE ORDERS (OUTPATIENT)
Dept: ADMINISTRATIVE | Facility: HOSPITAL | Age: 67
End: 2022-03-08

## 2022-03-08 DIAGNOSIS — J84.10 PULMONARY FIBROSIS: Primary | ICD-10-CM

## 2022-03-08 NOTE — OUTREACH NOTE
Medical Week 2 Survey    Flowsheet Row Responses   [facility] patient discharged from? [place]   Does the patient have one of the following disease processes/diagnoses(primary or secondary)? Other   Week 2 attempt successful? Yes   Call start time 1052   Discharge diagnosis Gastrointestinal hemorrhage with melena,  EGD   Call end time 1054   Is patient permission given to speak with other caregiver? Yes   List who call center can speak with Wife, Zeenat Eric reviewed with patient/caregiver? Yes   Is the patient having any side effects they believe may be caused by any medication additions or changes? No   Does the patient have all medications ordered at discharge? Yes   Is the patient taking all medications as directed (includes completed medication regime)? Yes   Does the patient have a primary care provider?  Yes   Does the patient have an appointment with their PCP within 7 days of discharge? Yes   Comments regarding PCP Has seen Dr. Tyler and GI   Has the patient kept scheduled appointments due by today? Yes   Has home health visited the patient within 72 hours of discharge? N/A   Did the patient receive a copy of their discharge instructions? Yes   Nursing interventions Reviewed instructions with patient   What is the patient's perception of their health status since discharge? Improving   Is the patient/caregiver able to teach back signs and symptoms related to disease process for when to call PCP? Yes   Is the patient/caregiver able to teach back signs and symptoms related to disease process for when to call 911? Yes   Is the patient/caregiver able to teach back the hierarchy of who to call/visit for symptoms/problems? PCP, Specialist, Home health nurse, Urgent Care, ED, 911 Yes   Additional teach back comments Hasn't smoked since he was discharge.    Week 2 Call Completed? Yes   Wrap up additional comments Pt states he is doing well. Has had FU with PCP and GI.           RICK BROTHERS - Registered Nurse

## 2022-03-16 ENCOUNTER — READMISSION MANAGEMENT (OUTPATIENT)
Dept: CALL CENTER | Facility: HOSPITAL | Age: 67
End: 2022-03-16

## 2022-03-16 NOTE — OUTREACH NOTE
Medical Week 3 Survey    Flowsheet Row Responses   [facility] patient discharged from? [place]   Does the patient have one of the following disease processes/diagnoses(primary or secondary)? Other   Week 3 attempt successful? Yes   Call start time 1635   Call end time 1637   Discharge diagnosis Gastrointestinal hemorrhage with melena,  EGD   Meds reviewed with patient/caregiver? Yes   Is the patient having any side effects they believe may be caused by any medication additions or changes? No   Does the patient have all medications ordered at discharge? Yes   Prescription comments has d/c'd ferrous sulfate after labs returned WNL's as instructed   Is the patient taking all medications as directed (includes completed medication regime)? Yes   Does the patient have a primary care provider?  Yes   Does the patient have an appointment with their PCP within 7 days of discharge? Yes   Has the patient kept scheduled appointments due by today? Yes   Has home health visited the patient within 72 hours of discharge? N/A   Psychosocial issues? No   Did the patient receive a copy of their discharge instructions? Yes   What is the patient's perception of their health status since discharge? Improving   Is the patient/caregiver able to teach back signs and symptoms related to disease process for when to call PCP? Yes   Is the patient/caregiver able to teach back signs and symptoms related to disease process for when to call 911? Yes   Is the patient/caregiver able to teach back the hierarchy of who to call/visit for symptoms/problems? PCP, Specialist, Home health nurse, Urgent Care, ED, 911 Yes   Additional teach back comments denies bleeding, dizziness   Week 3 Call Completed? Yes          MIGUEL CLAROS - Registered Nurse

## 2022-03-25 ENCOUNTER — READMISSION MANAGEMENT (OUTPATIENT)
Dept: CALL CENTER | Facility: HOSPITAL | Age: 67
End: 2022-03-25

## 2022-03-25 NOTE — OUTREACH NOTE
Medical Week 4 Survey    Flowsheet Row Responses   [facility] patient discharged from? [place]   Does the patient have one of the following disease processes/diagnoses(primary or secondary)? Other   Week 4 attempt successful? Yes   Call start time 1011   Call end time 1013   Discharge diagnosis Gastrointestinal hemorrhage with melena,  EGD   Meds reviewed with patient/caregiver? Yes   Is the patient taking all medications as directed (includes completed medication regime)? Yes   Has the patient kept scheduled appointments due by today? Yes   Psychosocial issues? No   What is the patient's perception of their health status since discharge? Returned to baseline/stable   Additional teach back comments denies melena.    Week 4 Call Completed? Yes   Would the patient like one additional call? No   Graduated Yes   Is the patient interested in additional calls from an ambulatory ?  NOTE:  applies to high risk patients requiring additional follow-up. No   Did the patient feel the follow up calls were helpful during their recovery period? Yes   Was the number of calls appropriate? Yes          JESUS MONTALVO - Registered Nurse

## 2022-04-06 ENCOUNTER — TRANSCRIBE ORDERS (OUTPATIENT)
Dept: ADMINISTRATIVE | Facility: HOSPITAL | Age: 67
End: 2022-04-06

## 2022-04-06 DIAGNOSIS — Z01.818 OTHER SPECIFIED PRE-OPERATIVE EXAMINATION: Primary | ICD-10-CM

## 2022-04-08 ENCOUNTER — TELEPHONE (OUTPATIENT)
Dept: GASTROENTEROLOGY | Facility: CLINIC | Age: 67
End: 2022-04-08

## 2022-04-08 DIAGNOSIS — Z01.818 PRE-OP TESTING: Primary | ICD-10-CM

## 2022-04-12 ENCOUNTER — LAB (OUTPATIENT)
Dept: LAB | Facility: HOSPITAL | Age: 67
End: 2022-04-12

## 2022-04-12 DIAGNOSIS — Z01.818 OTHER SPECIFIED PRE-OPERATIVE EXAMINATION: ICD-10-CM

## 2022-04-12 LAB — SARS-COV-2 ORF1AB RESP QL NAA+PROBE: NOT DETECTED

## 2022-04-12 PROCEDURE — U0004 COV-19 TEST NON-CDC HGH THRU: HCPCS

## 2022-04-12 PROCEDURE — C9803 HOPD COVID-19 SPEC COLLECT: HCPCS

## 2022-04-12 PROCEDURE — U0005 INFEC AGEN DETEC AMPLI PROBE: HCPCS

## 2022-04-13 ENCOUNTER — HOSPITAL ENCOUNTER (OUTPATIENT)
Facility: HOSPITAL | Age: 67
Setting detail: HOSPITAL OUTPATIENT SURGERY
Discharge: HOME | End: 2022-04-13
Attending: INTERNAL MEDICINE | Admitting: INTERNAL MEDICINE

## 2022-04-13 ENCOUNTER — ANESTHESIA (OUTPATIENT)
Dept: GASTROENTEROLOGY | Facility: HOSPITAL | Age: 67
End: 2022-04-13

## 2022-04-13 ENCOUNTER — ANESTHESIA EVENT (OUTPATIENT)
Dept: GASTROENTEROLOGY | Facility: HOSPITAL | Age: 67
End: 2022-04-13

## 2022-04-13 VITALS
OXYGEN SATURATION: 94 % | SYSTOLIC BLOOD PRESSURE: 115 MMHG | DIASTOLIC BLOOD PRESSURE: 77 MMHG | RESPIRATION RATE: 16 BRPM | BODY MASS INDEX: 26.38 KG/M2 | WEIGHT: 189.13 LBS | HEART RATE: 69 BPM

## 2022-04-13 DIAGNOSIS — D62 ACUTE BLOOD LOSS ANEMIA: ICD-10-CM

## 2022-04-13 DIAGNOSIS — K26.9 DUODENAL ULCER: ICD-10-CM

## 2022-04-13 LAB — GLUCOSE BLDC GLUCOMTR-MCNC: 102 MG/DL (ref 70–130)

## 2022-04-13 PROCEDURE — 25010000002 PROPOFOL 10 MG/ML EMULSION: Performed by: NURSE ANESTHETIST, CERTIFIED REGISTERED

## 2022-04-13 PROCEDURE — S0260 H&P FOR SURGERY: HCPCS | Performed by: INTERNAL MEDICINE

## 2022-04-13 PROCEDURE — 88305 TISSUE EXAM BY PATHOLOGIST: CPT | Performed by: INTERNAL MEDICINE

## 2022-04-13 PROCEDURE — 82962 GLUCOSE BLOOD TEST: CPT

## 2022-04-13 PROCEDURE — 43239 EGD BIOPSY SINGLE/MULTIPLE: CPT | Performed by: INTERNAL MEDICINE

## 2022-04-13 RX ORDER — SODIUM CHLORIDE, SODIUM LACTATE, POTASSIUM CHLORIDE, CALCIUM CHLORIDE 600; 310; 30; 20 MG/100ML; MG/100ML; MG/100ML; MG/100ML
30 INJECTION, SOLUTION INTRAVENOUS CONTINUOUS PRN
Status: DISCONTINUED | OUTPATIENT
Start: 2022-04-13 | End: 2022-04-13 | Stop reason: HOSPADM

## 2022-04-13 RX ORDER — PROPOFOL 10 MG/ML
VIAL (ML) INTRAVENOUS CONTINUOUS PRN
Status: DISCONTINUED | OUTPATIENT
Start: 2022-04-13 | End: 2022-04-13 | Stop reason: SURG

## 2022-04-13 RX ORDER — PROPOFOL 10 MG/ML
VIAL (ML) INTRAVENOUS AS NEEDED
Status: DISCONTINUED | OUTPATIENT
Start: 2022-04-13 | End: 2022-04-13 | Stop reason: SURG

## 2022-04-13 RX ORDER — LIDOCAINE HYDROCHLORIDE 20 MG/ML
INJECTION, SOLUTION INFILTRATION; PERINEURAL AS NEEDED
Status: DISCONTINUED | OUTPATIENT
Start: 2022-04-13 | End: 2022-04-13 | Stop reason: SURG

## 2022-04-13 RX ADMIN — SODIUM CHLORIDE, POTASSIUM CHLORIDE, SODIUM LACTATE AND CALCIUM CHLORIDE 30 ML/HR: 600; 310; 30; 20 INJECTION, SOLUTION INTRAVENOUS at 11:05

## 2022-04-13 RX ADMIN — Medication 200 MCG/KG/MIN: at 11:33

## 2022-04-13 RX ADMIN — PROPOFOL 50 MG: 10 INJECTION, EMULSION INTRAVENOUS at 11:35

## 2022-04-13 RX ADMIN — LIDOCAINE HYDROCHLORIDE 40 MG: 20 INJECTION, SOLUTION INFILTRATION; PERINEURAL at 11:33

## 2022-04-13 RX ADMIN — PROPOFOL 50 MG: 10 INJECTION, EMULSION INTRAVENOUS at 11:33

## 2022-04-13 NOTE — BRIEF OP NOTE
ESOPHAGOGASTRODUODENOSCOPY  Progress Note    Mark L Sturgeon  4/13/2022    Pre-op Diagnosis:   Duodenal ulcer [K26.9]  Acute blood loss anemia [D62]       Post-Op Diagnosis Codes:     * Duodenal ulcer [K26.9]     * Acute blood loss anemia [D62]     * Gastritis [K29.70]    Procedure/CPT® Codes:        Procedure(s):  ESOPHAGOGASTRODUODENOSCOPY with biopsies    Surgeon(s):  Cuate Lawson MD    Anesthesia: Monitored Anesthesia Care    Staff:   Endo Technician: Kate Vasquez PCT  Endo Nurse: Madison Nichols RN         Estimated Blood Loss: minimal    Urine Voided: * No values recorded between 4/13/2022 11:29 AM and 4/13/2022 11:40 AM *    Specimens:                Specimens     ID Source Type Tests Collected By Collected At Frozen?    A Gastric, Antrum Tissue · TISSUE PATHOLOGY EXAM   Cuate Lawson MD 4/13/22 1140                 Drains: * No LDAs found *    Findings: EGD with biopsy revealed healed duodenal ulcer.  Antral gastritis was seen biopsies of the antrum were obtained.  Retroflex view of the GE junction was unremarkable with normal esophageal mucosa with Z-line at 40 cm.    Complications: None          Cuate Lawson MD     Date: 4/13/2022  Time: 11:41 EDT

## 2022-04-13 NOTE — H&P
[facility]  Pre Procedure History & Physical    Chief Complaint:   Peptic ulcer disease    Subjective     HPI:   Patient 66-year-old male with history of COPD, hyperlipidemia and asthma presented initially with upper GI bleed found with duodenal ulcer here for follow-up EGD.    Past Medical History:   Past Medical History:   Diagnosis Date   • Arthritis    • Asthma    • COPD (chronic obstructive pulmonary disease) (HCC)    • History of transfusion    • Hyperlipidemia    • Pulmonary nodule        Past Surgical History:  Past Surgical History:   Procedure Laterality Date   • COLONOSCOPY N/A 1/22/2018    Diverticulosis in the sigmoid colon, in the descending colon, in the transverse colon and in the ascending colon, non-bleeding internal hemorrhoids   • ENDOSCOPY N/A 1/22/2018    LA Grade C reflux esophagitis, small hiatal hernia, erythematous mucosa in the stomach, duodenal erosions without bleeding   • ENDOSCOPY N/A 2/21/2022    Procedure: ESOPHAGOGASTRODUODENOSCOPY with biopsies, ulcer injected with 2cc epi and cauterized with gold probe;  Surgeon: Leroy Lion MD;  Location: [facility] END[facility]COPY;  Service: Gastroenterology;  Laterality: N/A;  pre:  GI bleed  post:  duodenal bulb ulcer, duodenal bulb polyp   • HAND SURGERY     • THROAT SURGERY  2012    polyp removal   • TONSILLECTOMY     • VASECTOMY         Family History:  Family History   Problem Relation Age of Onset   • Esophageal cancer Mother 68        smoker   • Asthma Mother    • Heart attack Father 50        smoker   • Asthma Sister         smoker   • Lung disease Brother    • Asthma Maternal Grandmother    • Diabetes Maternal Grandmother    • Heart attack Maternal Grandfather 75   • Colon cancer Neg Hx    • Prostate cancer Neg Hx        Social History:   reports that he has been smoking cigarettes. He has smoked for the past 45.00 years. He has never used smokeless tobacco. He reports previous alcohol use. He reports current drug use.  Frequency: 1.00 time per week. Drug: Marijuana.    Medications:   Medications Prior to Admission   Medication Sig Dispense Refill Last Dose   • Cholecalciferol (VITAMIN D PO) Take 1 tablet by mouth Daily.   4/12/2022 at Unknown time   • Cyanocobalamin (VITAMIN B-12 PO) Take 1 tablet by mouth Daily.   4/12/2022 at Unknown time   • losartan (COZAAR) 25 MG tablet TAKE 1 TABLET BY MOUTH DAILY 90 tablet 3 4/12/2022 at Unknown time   • Multiple Vitamins-Minerals (MULTIVITAMIN PO) Take  by mouth.   4/12/2022 at Unknown time   • pantoprazole (PROTONIX) 40 MG EC tablet Take 1 tablet by mouth 2 (Two) Times a Day Before Meals. 60 tablet 3 4/12/2022 at Unknown time   • rosuvastatin (CRESTOR) 10 MG tablet TAKE 1 TABLET BY MOUTH EVERY NIGHT 90 tablet 3 4/12/2022 at Unknown time   • sucralfate (CARAFATE) 1 g tablet Take 1 tablet by mouth 2 (Two) Times a Day Before Meals. 60 tablet 3 4/12/2022 at Unknown time   • Tiotropium Bromide-Olodaterol (STIOLTO RESPIMAT IN) Inhale. 2 puff daily   Past Week at Unknown time   • vitamin C (ASCORBIC ACID) 500 MG tablet Take 500 mg by mouth Daily.   4/12/2022 at Unknown time   • Blood Glucose Monitoring Suppl (BLOOD GLUCOSE MONITOR SYSTEM) w/Device kit 1 kit 2 (Two) Times a Day. 1 each 12    • Blood Glucose Monitoring Suppl (ONETOUCH VERIO) w/Device kit 1 kit Daily. 1 kit 0    • ferrous sulfate 325 (65 FE) MG tablet Take 1 tablet by mouth Every Other Day. 15 tablet 3    • glucose blood (ONETOUCH VERIO) test strip Give patient what insurance cover . 100 each 11    • Lancets (ONETOUCH ULTRASOFT) lancets Use to test glucose once daily.  Dx DM2  E11.9 100 each 11    • Omega-3 Fatty Acids (FISH OIL) 1000 MG capsule capsule Take  by mouth.          Allergies:  Codeine    ROS:    Pertinent items are noted in HPI     Objective     Blood pressure 131/82, pulse 68, resp. rate 16, weight 85.8 kg (189 lb 2 oz), SpO2 96 %.    Physical Exam   Constitutional: Pt is oriented to person, place, and time and  well-developed, well-nourished, and in no distress.   Mouth/Throat: Oropharynx is clear and moist.   Neck: Normal range of motion.   Cardiovascular: Normal rate, regular rhythm and normal heart sounds.    Pulmonary/Chest: Effort normal and breath sounds normal.   Abdominal: Soft. Nontender  Skin: Skin is warm and dry.   Psychiatric: Mood, memory, affect and judgment normal.     Assessment/Plan     Diagnosis:  Duodenal ulcer bleed    Anticipated Surgical Procedure:  EGD    The risks, benefits, and alternatives of this procedure have been discussed with the patient or the responsible party- the patient understands and agrees to proceed.

## 2022-04-13 NOTE — ANESTHESIA POSTPROCEDURE EVALUATION
Patient: Mark L Sturgeon    Procedure Summary     Date: 04/13/22 Room / Location:  [facility] /  [facility]    Anesthesia Start: 1131 Anesthesia Stop: 1145    Procedure: ESOPHAGOGASTRODUODENOSCOPY with biopsies (N/A Esophagus) Diagnosis:       Duodenal ulcer      Acute blood loss anemia      Gastritis      (Duodenal ulcer [K26.9])      (Acute blood loss anemia [D62])    Surgeons: Cuate Lawson MD Provider: Milo Dominguez MD    Anesthesia Type: MAC ASA Status: 3          Anesthesia Type: MAC    Vitals  Vitals Value Taken Time   /77 04/13/22 1203   Temp     Pulse 69 04/13/22 1203   Resp 16 04/13/22 1203   SpO2 94 % 04/13/22 1203           Post Anesthesia Care and Evaluation    Patient location during evaluation: bedside  Patient participation: complete - patient participated  Level of consciousness: awake and alert  Pain management: adequate  Airway patency: patent  Anesthetic complications: No anesthetic complications    Cardiovascular status: acceptable  Respiratory status: acceptable  Hydration status: acceptable    Comments: /77 (BP Location: Left arm, Patient Position: Lying)   Pulse 69   Resp 16   Wt 85.8 kg (189 lb 2 oz)   SpO2 94%   BMI 26.38 kg/m²

## 2022-04-13 NOTE — ANESTHESIA PREPROCEDURE EVALUATION
Anesthesia Evaluation     Patient summary reviewed and Nursing notes reviewed   no history of anesthetic complications:  NPO Solid Status: > 8 hours  NPO Liquid Status: > 4 hours           Airway   Mallampati: II  Dental      Pulmonary - normal exam   (+) a smoker Current, COPD, asthma,  Cardiovascular - normal exam    (+) hypertension less than 2 medications, CAD, hyperlipidemia,       Neuro/Psych  GI/Hepatic/Renal/Endo    (+)  PUD,  diabetes mellitus type 2,     Musculoskeletal     Abdominal    Substance History      OB/GYN          Other   arthritis,                      Anesthesia Plan    ASA 3     MAC     intravenous induction     Anesthetic plan, all risks, benefits, and alternatives have been provided, discussed and informed consent has been obtained with: patient.        CODE STATUS:

## 2022-04-14 LAB
LAB AP CASE REPORT: NORMAL
PATH REPORT.FINAL DX SPEC: NORMAL
PATH REPORT.GROSS SPEC: NORMAL

## 2022-05-04 ENCOUNTER — OFFICE VISIT (OUTPATIENT)
Dept: INTERNAL MEDICINE | Age: 67
End: 2022-05-04

## 2022-05-04 VITALS
DIASTOLIC BLOOD PRESSURE: 78 MMHG | HEART RATE: 83 BPM | SYSTOLIC BLOOD PRESSURE: 122 MMHG | BODY MASS INDEX: 26.88 KG/M2 | OXYGEN SATURATION: 97 % | WEIGHT: 192 LBS | TEMPERATURE: 96.9 F | HEIGHT: 71 IN

## 2022-05-04 DIAGNOSIS — D62 ACUTE POST-HEMORRHAGIC ANEMIA: Primary | ICD-10-CM

## 2022-05-04 DIAGNOSIS — I10 ESSENTIAL HYPERTENSION: Chronic | ICD-10-CM

## 2022-05-04 DIAGNOSIS — E11.9 TYPE 2 DIABETES MELLITUS WITHOUT COMPLICATION, WITHOUT LONG-TERM CURRENT USE OF INSULIN: ICD-10-CM

## 2022-05-04 DIAGNOSIS — E78.5 HYPERLIPIDEMIA, UNSPECIFIED HYPERLIPIDEMIA TYPE: Chronic | ICD-10-CM

## 2022-05-04 DIAGNOSIS — J44.9 CHRONIC OBSTRUCTIVE PULMONARY DISEASE, UNSPECIFIED COPD TYPE: Chronic | ICD-10-CM

## 2022-05-04 PROCEDURE — 99214 OFFICE O/P EST MOD 30 MIN: CPT | Performed by: INTERNAL MEDICINE

## 2022-05-04 NOTE — ASSESSMENT & PLAN NOTE
Not using Symbicort inhaler due to cost.  I advised him to use it daily.  Discussed Medicare part D donut hole factor.

## 2022-05-04 NOTE — PROGRESS NOTES
I N T E R N A L  M E D I C I N E  J U N O H  K I M,  M D      ENCOUNTER DATE:  05/04/2022    Mark L Sturgeon / 66 y.o. / male      CHIEF COMPLAINT / REASON FOR OFFICE VISIT     Diabetes, Hyperlipidemia, Hypertension, and COPD      ASSESSMENT & PLAN     Problem List Items Addressed This Visit        High    Type 2 diabetes mellitus without complication, without long-term current use of insulin (HCC) (Chronic)    Overview     Continue metformin  mg 2 BID.            Current Assessment & Plan     Previously metformin discontinued due to lower A1c and abnormal weight loss.   Check A1c today. Continue holding metformin.            Relevant Medications    Lancets (ONETOUCH ULTRASOFT) lancets    Other Relevant Orders    Hemoglobin A1c    Microalbumin / Creatinine Urine Ratio - Urine, Clean Catch       Medium    COPD (chronic obstructive pulmonary disease) (HCC) (Chronic)    Overview     *Guillaume           Current Assessment & Plan     Not using Symbicort inhaler due to cost.  I advised him to use it daily.  Discussed Medicare part D donut hole factor.             Hyperlipidemia (Chronic)    Overview     Continue rosuvastatin 10 mg qd.            Relevant Medications    rosuvastatin (CRESTOR) 10 MG tablet    Essential hypertension (Chronic)    Overview     Continue losartan 25 mg qd.            Relevant Medications    losartan (COZAAR) 25 MG tablet      Other Visit Diagnoses     Acute post-hemorrhagic anemia    -  Primary    Relevant Orders    Hemoglobin & Hematocrit, Blood        Orders Placed This Encounter   Procedures   • Hemoglobin A1c   • Hemoglobin & Hematocrit, Blood   • Microalbumin / Creatinine Urine Ratio - Urine, Clean Catch     No orders of the defined types were placed in this encounter.      SUMMARY/DISCUSSION  •       Next Appointment with me: Visit date not found    Return in about 6 months (around 11/4/2022) for Reassess chronic medical problems.      VITAL SIGNS     Visit Vitals  /78 (BP  "Location: Left arm)   Pulse 83   Temp 96.9 °F (36.1 °C)   Ht 180.3 cm (71\")   Wt 87.1 kg (192 lb)   SpO2 97%   BMI 26.78 kg/m²       BP Readings from Last 3 Encounters:   05/04/22 122/78   04/13/22 115/77   03/02/22 122/70     Wt Readings from Last 3 Encounters:   05/04/22 87.1 kg (192 lb)   04/13/22 85.8 kg (189 lb 2 oz)   03/03/22 82.5 kg (181 lb 12.8 oz)     Body mass index is 26.78 kg/m².    Blood pressure readings recorded on patient flowsheet:  No flowsheet data found.       MEDICATIONS AT THE TIME OF OFFICE VISIT     Current Outpatient Medications on File Prior to Visit   Medication Sig   • Lancets (ONETOUCH ULTRASOFT) lancets Use to test glucose once daily.  Dx DM2  E11.9   • losartan (COZAAR) 25 MG tablet TAKE 1 TABLET BY MOUTH DAILY   • Multiple Vitamins-Minerals (MULTIVITAMIN PO) Take  by mouth.   • pantoprazole (PROTONIX) 40 MG EC tablet Take 1 tablet by mouth 2 (Two) Times a Day Before Meals.   • rosuvastatin (CRESTOR) 10 MG tablet TAKE 1 TABLET BY MOUTH EVERY NIGHT   • [DISCONTINUED] Blood Glucose Monitoring Suppl (BLOOD GLUCOSE MONITOR SYSTEM) w/Device kit 1 kit 2 (Two) Times a Day.   • [DISCONTINUED] Blood Glucose Monitoring Suppl (ONETOUCH VERIO) w/Device kit 1 kit Daily.   • [DISCONTINUED] Tiotropium Bromide-Olodaterol (STIOLTO RESPIMAT IN) Inhale. 2 puff daily   • Cholecalciferol (VITAMIN D PO) Take 1 tablet by mouth Daily.   • Cyanocobalamin (VITAMIN B-12 PO) Take 1 tablet by mouth Daily.   • vitamin C (ASCORBIC ACID) 500 MG tablet Take 500 mg by mouth Daily.   • [DISCONTINUED] ferrous sulfate 325 (65 FE) MG tablet Take 1 tablet by mouth Every Other Day.   • [DISCONTINUED] glucose blood (ONETOUCH VERIO) test strip Give patient what insurance cover .   • [DISCONTINUED] Omega-3 Fatty Acids (FISH OIL) 1000 MG capsule capsule Take  by mouth.     No current facility-administered medications on file prior to visit.          HISTORY OF PRESENT ILLNESS     He underwent repeat EGD with resolution of " ulcer.  Denies any recurrent signs of bleeding including melena or blood in his stool.  Previously metformin was discontinued due to lower A1c and abnormal weight loss.  His weight has started coming back up a little bit.  He has not been using Symbicort inhaler on daily basis due to cost.  He is followed by his pulmonologist.  He has ongoing dyspnea on daily basis.  Hypertension remains stable on losartan 25 mg.  He is on rosuvastatin 10 mg for hyperlipidemia without problems.      Patient Care Team:  Edmundo Tyler MD as PCP - General (Internal Medicine)  Baylee Galaviz MD as Consulting Physician (Pulmonary Disease)  Cuate Lawson MD as Consulting Physician (Gastroenterology)    REVIEW OF SYSTEMS     Review of Systems   Weight is coming up  No chest pain   Dyspnea/wheezing  No melena or blood in stool     PHYSICAL EXAMINATION     Physical Exam  General: No acute distress  Psych: Normal thought and judgment   Cardiovascular Rate: normal. Rhythm: regular. Heart sounds: normal.   Lungs: decreased breath sounds throughout w/ diffuse exp wheezing       REVIEWED DATA     Labs:     Lab Results   Component Value Date     02/26/2022    K 4.1 02/26/2022    CALCIUM 8.6 02/26/2022    AST 11 02/20/2022    ALT 8 02/20/2022    BUN 8 02/26/2022    CREATININE 0.64 (L) 02/26/2022    CREATININE 0.53 (L) 02/25/2022    CREATININE 0.43 (L) 02/24/2022    EGFRIFNONA 124 02/26/2022    EGFRIFAFRI 127 08/26/2021       Lab Results   Component Value Date    HGBA1C 6.1 (H) 11/29/2021    HGBA1C 5.9 01/18/2021    HGBA1C 6.0 07/16/2020       Lab Results   Component Value Date    LDL 40 11/29/2021    LDL 34 01/18/2021    HDL 59 11/29/2021    TRIG 86 11/29/2021       Lab Results   Component Value Date    TSH 2.590 08/26/2021    TSH 2.050 11/25/2019    TSH 2.870 10/02/2018    FREET4 1.23 08/26/2021    FREET4 1.32 11/25/2019    FREET4 1.20 10/02/2018       Lab Results   Component Value Date    WBC 7.9 03/03/2022    HGB 9.3 (L) 03/03/2022      (H) 03/03/2022     Lab Results   Component Value Date    FERRITIN 35 03/03/2022    FERRITIN 70.10 02/25/2022    FERRITIN 37.40 02/20/2022     Lab Results   Component Value Date    IRON 18 (L) 02/25/2022    IRON 89 02/20/2022      Lab Results   Component Value Date    TIBC 301 03/03/2022    TIBC 240 (L) 02/25/2022    TIBC 247 (L) 02/20/2022       No results found for: MICROALBUR        Imaging:           Medical Tests:           Summary of old records / correspondence / consultant report:           Request outside records:             *Examiner was wearing [id number] mask and eye protection during the entire duration of the visit. Patient was masked the entire time. Minimum social distance of 6 ft maintained entire visit except if physical contact was necessary as documented.       Template created by Mandy Tyler MD

## 2022-05-04 NOTE — ASSESSMENT & PLAN NOTE
Wt Readings from Last 3 Encounters:   05/04/22 87.1 kg (192 lb)   04/13/22 85.8 kg (189 lb 2 oz)   03/03/22 82.5 kg (181 lb 12.8 oz)     Body mass index is 26.78 kg/m².       Weight is coming back up. Will continue to follow.

## 2022-05-04 NOTE — ASSESSMENT & PLAN NOTE
Previously metformin discontinued due to lower A1c and abnormal weight loss.   Check A1c today. Continue holding metformin.

## 2022-05-05 LAB
ALBUMIN/CREAT UR: 11 MG/G CREAT (ref 0–29)
CREAT UR-MCNC: 150.2 MG/DL
HBA1C MFR BLD: 6 % (ref 4.8–5.6)
HCT VFR BLD AUTO: 36.5 % (ref 37.5–51)
HGB BLD-MCNC: 11.6 G/DL (ref 13–17.7)
MICROALBUMIN UR-MCNC: 17.2 UG/ML

## 2022-05-31 NOTE — ASSESSMENT & PLAN NOTE
Continue losartan 25 mg qd.    PE:   awake and alert resting in bed , no acute distress  Afebrile VSS , /75, P135**, R 20    Abdomen:  obese, soft, healed surgical scars mild discomfort on general palpation abdomen  ( received IV pain medication earlier.)  Back: no flank/CVA pain or tenderness on palpation  : healed surgical scars , hx abscess ( scrotal ), and right orchiectomy .  voids with only complaint of burning ,  denies urgency, difficulty voiding , and or hematuria.    Labs:  5/31/22  CBC: wbc 16.9/ hg 12.3/ hct 37.3/ plats 355   **( repeat wbc pending )   Bun: 19.2creat,: 0.92    radiology: 5/31/22  impression: mild- moderate right hydroureteronephrosis, secondary to an approximately 8 x 11 mm sized distal right ureteral calculus.  evidence of secondary perinephric and periureteral stranding.  See written report for full explanation.      Impression:   renal colic, Known stone distal right ureter with mild-mod. right hydroureteronephrosis,  Afebrile, with elevated WBC.   Discussed present situation and continued care and management.  Plan:  will repeat WBC, and follow pain management.  Will re-eval then determine definitive care.

## 2022-07-11 ENCOUNTER — TELEPHONE (OUTPATIENT)
Dept: GASTROENTEROLOGY | Facility: CLINIC | Age: 67
End: 2022-07-11

## 2022-07-11 NOTE — TELEPHONE ENCOUNTER
Caller: Sturgeon,Debbie    Relationship: Emergency Contact    Best call back number: [phone]    What is the best time to reach you: ANYTIME    Who are you requesting to speak with (clinical staff, provider,  specific staff member): CLINICAL STAFF    What was the call regarding: PT HAS COMPLETED PROTONIC WANTS TO KNOW IF HE WILL NEED TO CONTINUE WITH THIS MEDICATION? NO MORE REFILLS AT THIS TIME.  TOOK LAST PILL.     Do you require a callback: YES

## 2022-07-13 NOTE — TELEPHONE ENCOUNTER
Looks like Dr. Lawson wanted him to stay on PPI therapy for 1 year.  Please provide him with a refill and we will need to see him back in the office in the spring.

## 2022-07-19 RX ORDER — PANTOPRAZOLE SODIUM 40 MG/1
40 TABLET, DELAYED RELEASE ORAL
Qty: 60 TABLET | Refills: 7 | Status: SHIPPED | OUTPATIENT
Start: 2022-07-19 | End: 2023-03-31

## 2022-07-25 ENCOUNTER — TELEPHONE (OUTPATIENT)
Dept: GASTROENTEROLOGY | Facility: CLINIC | Age: 67
End: 2022-07-25

## 2022-07-25 NOTE — TELEPHONE ENCOUNTER
Caller: Sturgeon, Mark L    Relationship: Self    Best call back number: [phone]    What is the best time to reach you: ANYTIME    Who are you requesting to speak with (clinical staff, provider,  specific staff member): CLINICAL STAFF    What was the call regarding: PATIENT HAS QUESTIONS ABOUT PRESCRIPTION REFILL. WOULD LIKE TO SPEAK WITH SOMEONE IN OFFICE REGARDING THIS.    Do you require a callback: YES

## 2022-07-25 NOTE — TELEPHONE ENCOUNTER
Called pt and advised Dr Lawson wanted pt to be on PPI for one year.  Pt verbalized understanding and will  pantoprazole.

## 2022-08-11 ENCOUNTER — TRANSCRIBE ORDERS (OUTPATIENT)
Dept: INTERNAL MEDICINE | Age: 67
End: 2022-08-11

## 2022-08-11 DIAGNOSIS — Z13.6 ENCOUNTER FOR SCREENING FOR VASCULAR DISEASE: Primary | ICD-10-CM

## 2022-09-30 DIAGNOSIS — I10 ESSENTIAL HYPERTENSION: Chronic | ICD-10-CM

## 2022-09-30 RX ORDER — LOSARTAN POTASSIUM 25 MG/1
25 TABLET ORAL DAILY
Qty: 90 TABLET | Refills: 0 | Status: SHIPPED | OUTPATIENT
Start: 2022-09-30 | End: 2023-02-08

## 2022-11-08 ENCOUNTER — OFFICE VISIT (OUTPATIENT)
Dept: INTERNAL MEDICINE | Age: 67
End: 2022-11-08

## 2022-11-08 VITALS
HEART RATE: 93 BPM | WEIGHT: 201 LBS | SYSTOLIC BLOOD PRESSURE: 122 MMHG | HEIGHT: 71 IN | OXYGEN SATURATION: 94 % | TEMPERATURE: 97.7 F | BODY MASS INDEX: 28.14 KG/M2 | DIASTOLIC BLOOD PRESSURE: 70 MMHG

## 2022-11-08 DIAGNOSIS — E78.5 HYPERLIPIDEMIA, UNSPECIFIED HYPERLIPIDEMIA TYPE: Chronic | ICD-10-CM

## 2022-11-08 DIAGNOSIS — I25.10 CORONARY ARTERY DISEASE INVOLVING NATIVE CORONARY ARTERY OF NATIVE HEART WITHOUT ANGINA PECTORIS: Chronic | ICD-10-CM

## 2022-11-08 DIAGNOSIS — E11.9 TYPE 2 DIABETES MELLITUS WITHOUT COMPLICATION, WITHOUT LONG-TERM CURRENT USE OF INSULIN: Primary | Chronic | ICD-10-CM

## 2022-11-08 DIAGNOSIS — I10 ESSENTIAL HYPERTENSION: Chronic | ICD-10-CM

## 2022-11-08 DIAGNOSIS — K26.9 DUODENAL ULCER: ICD-10-CM

## 2022-11-08 PROBLEM — K92.1 GASTROINTESTINAL HEMORRHAGE WITH MELENA: Status: RESOLVED | Noted: 2022-02-20 | Resolved: 2022-11-08

## 2022-11-08 PROBLEM — D62 ACUTE BLOOD LOSS ANEMIA: Status: RESOLVED | Noted: 2022-02-20 | Resolved: 2022-11-08

## 2022-11-08 PROCEDURE — 99214 OFFICE O/P EST MOD 30 MIN: CPT | Performed by: INTERNAL MEDICINE

## 2022-11-08 RX ORDER — DIPHENOXYLATE HYDROCHLORIDE AND ATROPINE SULFATE 2.5; .025 MG/1; MG/1
TABLET ORAL DAILY
COMMUNITY
End: 2022-11-08 | Stop reason: ALTCHOICE

## 2022-11-08 RX ORDER — FORMOTEROL FUMARATE 20 UG/2ML
SOLUTION RESPIRATORY (INHALATION)
COMMUNITY
Start: 2022-08-01

## 2022-11-08 RX ORDER — MULTIPLE VITAMINS W/ MINERALS TAB 9MG-400MCG
TAB ORAL
COMMUNITY
Start: 2022-06-01

## 2022-11-08 NOTE — ASSESSMENT & PLAN NOTE
Previously he developed a duodenal ulcer from NSAID use.  I advised him to stay on pantoprazole 40 mg daily long-term.  Avoid any NSAIDs going forward.

## 2022-11-08 NOTE — PROGRESS NOTES
I N T E R N A L  M E D I C I N E    J U N O H  K I M,  M D      ENCOUNTER DATE:  11/08/2022    Mark L Sturgeon / 67 y.o. / male    CHIEF COMPLAINT / REASON FOR OFFICE VISIT     Diabetes, Hypertension, and Hyperlipidemia      ASSESSMENT & PLAN     Problem List Items Addressed This Visit        High    Type 2 diabetes mellitus without complication, without long-term current use of insulin (HCC) - Primary (Chronic)    Current Assessment & Plan     Lab Results   Component Value Date    HGBA1C 6.0 (H) 05/04/2022    HGBA1C 6.1 (H) 11/29/2021    HGBA1C 5.9 01/18/2021     Metformin was discontinued previously.  Check A1c level.  He does not monitor glucose at home.  Continue to maintain low carbohydrate diet.         Relevant Medications    Lancets (ONETOUCH ULTRASOFT) lancets    Other Relevant Orders    Hemoglobin A1c       Medium    Hyperlipidemia (Chronic)    Overview     Continue rosuvastatin 10 mg qd.          Relevant Medications    rosuvastatin (CRESTOR) 10 MG tablet    Other Relevant Orders    Comprehensive Metabolic Panel    Lipid Panel With / Chol / HDL Ratio    Essential hypertension (Chronic)    Overview     Continue losartan 25 mg qd.          Relevant Medications    losartan (COZAAR) 25 MG tablet    Other Relevant Orders    Comprehensive Metabolic Panel       Low    Coronary artery disease involving native coronary artery of native heart without angina pectoris (Chronic)    Overview     Noted on heart cath in 2014    Continue ASA 81 mg qd, statin therapy.             Unprioritized    Duodenal ulcer    Current Assessment & Plan     Previously had developed a duodenal ulcer from NSAID use.  Repeat EGD shows healing of the ulcer.  He was advised to continue pantoprazole 40 mg daily and he was instructed to avoid any NSAIDs going forward.         Relevant Medications    pantoprazole (PROTONIX) 40 MG EC tablet    Other Relevant Orders    CBC & Differential     Orders Placed This Encounter   Procedures   •  "Comprehensive Metabolic Panel   • Hemoglobin A1c   • Lipid Panel With / Chol / HDL Ratio   • CBC & Differential     No orders of the defined types were placed in this encounter.      SUMMARY/DISCUSSION  •       Next Appointment with me: Visit date not found    Return in about 5 months (around 4/8/2023) for Reassess chronic medical problems.      VITAL SIGNS     Vitals:    11/08/22 1352   BP: 122/70   Pulse: 93   Temp: 97.7 °F (36.5 °C)   SpO2: 94%   Weight: 91.2 kg (201 lb)   Height: 180.3 cm (71\")       BP Readings from Last 3 Encounters:   11/08/22 122/70   05/04/22 122/78   04/13/22 115/77     Wt Readings from Last 3 Encounters:   11/08/22 91.2 kg (201 lb)   05/04/22 87.1 kg (192 lb)   04/13/22 85.8 kg (189 lb 2 oz)     Body mass index is 28.03 kg/m².    Blood pressure readings recorded on patient flowsheet:  No flowsheet data found.       MEDICATIONS AT THE TIME OF OFFICE VISIT     Current Outpatient Medications on File Prior to Visit   Medication Sig   • budesonide 0.25 MG/2ML suspension    • formoterol (PERFOROMIST) 20 MCG/2ML nebulizer solution    • losartan (COZAAR) 25 MG tablet TAKE 1 TABLET BY MOUTH DAILY   • Multiple Vitamins-Minerals (MULTIVITAMIN PO) Take  by mouth.   • multivitamin with minerals tablet tablet    • pantoprazole (PROTONIX) 40 MG EC tablet Take 1 tablet by mouth 2 (Two) Times a Day Before Meals.   • revefenacin (YUPELRI) 175 MCG/3ML nebulizer solution    • rosuvastatin (CRESTOR) 10 MG tablet TAKE 1 TABLET BY MOUTH EVERY NIGHT   • [DISCONTINUED] multivitamin (THERAGRAN) tablet tablet Take  by mouth Daily. 65+   • Cholecalciferol (VITAMIN D PO) Take 1 tablet by mouth Daily.   • Cyanocobalamin (VITAMIN B-12 PO) Take 1 tablet by mouth Daily.   • Lancets (ONETOUCH ULTRASOFT) lancets Use to test glucose once daily.  Dx DM2  E11.9   • vitamin C (ASCORBIC ACID) 500 MG tablet Take 500 mg by mouth Daily.     No current facility-administered medications on file prior to visit.          HISTORY OF " PRESENT ILLNESS     The patient denies checking his blood glucose levels at home. He notes that he feels well.  Previously metformin was discontinued due to improvement in A1c and significant weight loss.  His last hemoglobin A1c was 6.0%. He has gained some weight since his previous visit.     He reports that he had a GI bleed in 02/20/2022. He had a upper endoscopy which showed gastrointestinal hemorrhage with melena and a duodenal ulcer. He was admitted in the [facility]. He was taking anti-inflammatories during this time. He had a repeat upper endoscopy that showed patchy moderately erythematous mucosa without bleeding noted in the gastric body and biopsies were taken. He was instructed by Dr. Lawson to continue to take pantoprazole. He took iron supplements for approximately 3 or 4 days after he was discharged from the hospital. He was instructed to discontinue the iron supplements at his follow up appointment. He had esophagitis in 2018.     His blood pressure is within normal range today. He is taking losartan 25 mg.     He states that his breathing has improved. He started on a nebulizer in 08/2022. He denies any angina. He is still smoking and drinking. He drinks approximately 12 beers per week. He has an upcoming appointment with pulmonologist, Dr. Galaviz, in 01/2023.     He reports waking up at least 2 to 3 times at night to urinate.         Patient Care Team:  Edmundo Tyler MD as PCP - General (Internal Medicine)  Baylee Galaviz MD as Consulting Physician (Pulmonary Disease)  Cuate Lawson MD as Consulting Physician (Gastroenterology)    REVIEW OF SYSTEMS     Review of Systems   Weight gain noted  No chest pain   Denies worsening cough or shortness of breath   No melena or blood in stool. No abdominal pain.    PHYSICAL EXAMINATION     Physical Exam  General: No acute distress; weight gain noted   Psych: Normal thought and judgment   Cardiovascular Rate: normal. Rhythm: regular. Heart sounds: normal.    Lungs: coarse breath sounds bilaterally       REVIEWED DATA     Labs:     Lab Results   Component Value Date     02/26/2022    K 4.1 02/26/2022    CALCIUM 8.6 02/26/2022    AST 11 02/20/2022    ALT 8 02/20/2022    BUN 8 02/26/2022    CREATININE 0.64 (L) 02/26/2022    CREATININE 0.53 (L) 02/25/2022    CREATININE 0.43 (L) 02/24/2022    EGFRIFNONA 124 02/26/2022    EGFRIFAFRI 127 08/26/2021       Lab Results   Component Value Date    HGBA1C 6.0 (H) 05/04/2022    HGBA1C 6.1 (H) 11/29/2021    HGBA1C 5.9 01/18/2021       Lab Results   Component Value Date    LDL 40 11/29/2021    LDL 34 01/18/2021    LDL 26 11/25/2019    HDL 59 11/29/2021    HDL 52 01/18/2021    TRIG 86 11/29/2021    TRIG 76 01/18/2021       Lab Results   Component Value Date    TSH 2.590 08/26/2021    TSH 2.050 11/25/2019    TSH 2.870 10/02/2018    FREET4 1.23 08/26/2021    FREET4 1.32 11/25/2019    FREET4 1.20 10/02/2018       Lab Results   Component Value Date    WBC 7.9 03/03/2022    HGB 11.6 (L) 05/04/2022     (H) 03/03/2022       Lab Results   Component Value Date    MALBCRERATIO 11 05/04/2022          Imaging:           Medical Tests:           Summary of old records / correspondence / consultant report:           Request outside records:             *Examiner was wearing KN95 mask and eye protection during the entire duration of the visit. Patient was masked the entire time. Minimum social distance of 6 ft maintained entire visit except if physical contact was necessary as documented.       Template created by Mandy Tyler MD     Transcribed from ambient dictation for Edmundo Tyler MD by Angelia Lomeli.  11/08/22   15:30 EST    Patient or patient representative verbalized consent to the visit recording.  I have personally performed the services described in this document as transcribed by the above individual, and it is both accurate and complete.  Edmundo Tyler MD  11/8/2022  16:22 EST

## 2022-11-08 NOTE — ASSESSMENT & PLAN NOTE
Weight loss has stabilized and his weight is actually coming back up now.  Wt Readings from Last 3 Encounters:   11/08/22 91.2 kg (201 lb)   05/04/22 87.1 kg (192 lb)   04/13/22 85.8 kg (189 lb 2 oz)     Body mass index is 28.03 kg/m².

## 2022-11-08 NOTE — ASSESSMENT & PLAN NOTE
Lab Results   Component Value Date    HGBA1C 6.0 (H) 05/04/2022    HGBA1C 6.1 (H) 11/29/2021    HGBA1C 5.9 01/18/2021     Metformin was discontinued previously.  Check A1c level.  He does not monitor glucose at home.  Continue to maintain low carbohydrate diet.

## 2022-11-08 NOTE — ASSESSMENT & PLAN NOTE
Previously had developed a duodenal ulcer from NSAID use.  Repeat EGD shows healing of the ulcer.  He was advised to continue pantoprazole 40 mg daily and he was instructed to avoid any NSAIDs going forward.

## 2022-11-09 LAB
ALBUMIN SERPL-MCNC: 4.9 G/DL (ref 3.5–5.2)
ALBUMIN/GLOB SERPL: 1.9 G/DL
ALP SERPL-CCNC: 67 U/L (ref 39–117)
ALT SERPL-CCNC: 9 U/L (ref 1–41)
AST SERPL-CCNC: 14 U/L (ref 1–40)
BASOPHILS # BLD AUTO: 0.05 10*3/MM3 (ref 0–0.2)
BASOPHILS NFR BLD AUTO: 0.5 % (ref 0–1.5)
BILIRUB SERPL-MCNC: 0.3 MG/DL (ref 0–1.2)
BUN SERPL-MCNC: 15 MG/DL (ref 8–23)
BUN/CREAT SERPL: 17 (ref 7–25)
CALCIUM SERPL-MCNC: 10.1 MG/DL (ref 8.6–10.5)
CHLORIDE SERPL-SCNC: 101 MMOL/L (ref 98–107)
CHOLEST SERPL-MCNC: 149 MG/DL (ref 0–200)
CHOLEST/HDLC SERPL: 2.33 {RATIO}
CO2 SERPL-SCNC: 30.8 MMOL/L (ref 22–29)
CREAT SERPL-MCNC: 0.88 MG/DL (ref 0.76–1.27)
EGFRCR SERPLBLD CKD-EPI 2021: 93.1 ML/MIN/1.73
EOSINOPHIL # BLD AUTO: 0.3 10*3/MM3 (ref 0–0.4)
EOSINOPHIL NFR BLD AUTO: 2.9 % (ref 0.3–6.2)
ERYTHROCYTE [DISTWIDTH] IN BLOOD BY AUTOMATED COUNT: 13.7 % (ref 12.3–15.4)
GLOBULIN SER CALC-MCNC: 2.6 GM/DL
GLUCOSE SERPL-MCNC: 87 MG/DL (ref 65–99)
HBA1C MFR BLD: 6.5 % (ref 4.8–5.6)
HCT VFR BLD AUTO: 44.8 % (ref 37.5–51)
HDLC SERPL-MCNC: 64 MG/DL (ref 40–60)
HGB BLD-MCNC: 14.6 G/DL (ref 13–17.7)
IMM GRANULOCYTES # BLD AUTO: 0.05 10*3/MM3 (ref 0–0.05)
IMM GRANULOCYTES NFR BLD AUTO: 0.5 % (ref 0–0.5)
LDLC SERPL CALC-MCNC: 69 MG/DL (ref 0–100)
LYMPHOCYTES # BLD AUTO: 2.44 10*3/MM3 (ref 0.7–3.1)
LYMPHOCYTES NFR BLD AUTO: 23.4 % (ref 19.6–45.3)
MCH RBC QN AUTO: 29.9 PG (ref 26.6–33)
MCHC RBC AUTO-ENTMCNC: 32.6 G/DL (ref 31.5–35.7)
MCV RBC AUTO: 91.6 FL (ref 79–97)
MONOCYTES # BLD AUTO: 1 10*3/MM3 (ref 0.1–0.9)
MONOCYTES NFR BLD AUTO: 9.6 % (ref 5–12)
NEUTROPHILS # BLD AUTO: 6.58 10*3/MM3 (ref 1.7–7)
NEUTROPHILS NFR BLD AUTO: 63.1 % (ref 42.7–76)
NRBC BLD AUTO-RTO: 0 /100 WBC (ref 0–0.2)
PLATELET # BLD AUTO: 286 10*3/MM3 (ref 140–450)
POTASSIUM SERPL-SCNC: 4.7 MMOL/L (ref 3.5–5.2)
PROT SERPL-MCNC: 7.5 G/DL (ref 6–8.5)
RBC # BLD AUTO: 4.89 10*6/MM3 (ref 4.14–5.8)
SODIUM SERPL-SCNC: 141 MMOL/L (ref 136–145)
TRIGL SERPL-MCNC: 88 MG/DL (ref 0–150)
VLDLC SERPL CALC-MCNC: 16 MG/DL (ref 5–40)
WBC # BLD AUTO: 10.42 10*3/MM3 (ref 3.4–10.8)

## 2022-12-18 DIAGNOSIS — E78.00 PURE HYPERCHOLESTEROLEMIA: Chronic | ICD-10-CM

## 2022-12-19 RX ORDER — ROSUVASTATIN CALCIUM 10 MG/1
10 TABLET, COATED ORAL NIGHTLY
Qty: 90 TABLET | Refills: 3 | Status: SHIPPED | OUTPATIENT
Start: 2022-12-19

## 2022-12-30 ENCOUNTER — APPOINTMENT (OUTPATIENT)
Dept: CARDIOLOGY | Facility: HOSPITAL | Age: 67
End: 2022-12-30

## 2023-02-08 DIAGNOSIS — I10 ESSENTIAL HYPERTENSION: Chronic | ICD-10-CM

## 2023-02-08 RX ORDER — LOSARTAN POTASSIUM 25 MG/1
25 TABLET ORAL DAILY
Qty: 90 TABLET | Refills: 3 | Status: SHIPPED | OUTPATIENT
Start: 2023-02-08

## 2023-02-28 ENCOUNTER — HOSPITAL ENCOUNTER (OUTPATIENT)
Dept: CT IMAGING | Facility: HOSPITAL | Age: 68
Discharge: HOME | End: 2023-02-28
Admitting: INTERNAL MEDICINE
Payer: MEDICARE

## 2023-02-28 DIAGNOSIS — J84.10 PULMONARY FIBROSIS: ICD-10-CM

## 2023-02-28 PROCEDURE — 71250 CT THORAX DX C-: CPT

## 2023-03-31 ENCOUNTER — TELEPHONE (OUTPATIENT)
Dept: GASTROENTEROLOGY | Facility: CLINIC | Age: 68
End: 2023-03-31

## 2023-03-31 RX ORDER — PANTOPRAZOLE SODIUM 40 MG/1
TABLET, DELAYED RELEASE ORAL
Qty: 180 TABLET | Refills: 0 | Status: SHIPPED | OUTPATIENT
Start: 2023-03-31 | End: 2023-08-03

## 2023-04-12 ENCOUNTER — OFFICE VISIT (OUTPATIENT)
Dept: INTERNAL MEDICINE | Age: 68
End: 2023-04-12
Payer: MEDICARE

## 2023-04-12 VITALS
HEIGHT: 71 IN | TEMPERATURE: 97.3 F | WEIGHT: 205 LBS | SYSTOLIC BLOOD PRESSURE: 126 MMHG | DIASTOLIC BLOOD PRESSURE: 80 MMHG | HEART RATE: 73 BPM | OXYGEN SATURATION: 95 % | BODY MASS INDEX: 28.7 KG/M2

## 2023-04-12 DIAGNOSIS — F17.219 CIGARETTE NICOTINE DEPENDENCE WITH NICOTINE-INDUCED DISORDER: Chronic | ICD-10-CM

## 2023-04-12 DIAGNOSIS — I25.10 CORONARY ARTERY DISEASE INVOLVING NATIVE CORONARY ARTERY OF NATIVE HEART WITHOUT ANGINA PECTORIS: Chronic | ICD-10-CM

## 2023-04-12 DIAGNOSIS — J84.10 PULMONARY FIBROSIS: Chronic | ICD-10-CM

## 2023-04-12 DIAGNOSIS — I10 ESSENTIAL HYPERTENSION: Chronic | ICD-10-CM

## 2023-04-12 DIAGNOSIS — E11.9 TYPE 2 DIABETES MELLITUS WITHOUT COMPLICATION, WITHOUT LONG-TERM CURRENT USE OF INSULIN: Primary | Chronic | ICD-10-CM

## 2023-04-12 DIAGNOSIS — E78.5 HYPERLIPIDEMIA, UNSPECIFIED HYPERLIPIDEMIA TYPE: Chronic | ICD-10-CM

## 2023-04-12 LAB — HBA1C MFR BLD: 6.1 % (ref 4.8–5.6)

## 2023-04-12 NOTE — ASSESSMENT & PLAN NOTE
Lab Results   Component Value Date    HGBA1C 6.50 (H) 11/08/2022    HGBA1C 6.0 (H) 05/04/2022    HGBA1C 6.1 (H) 11/29/2021        Currently no medication.   Check A1c today.

## 2023-04-12 NOTE — ASSESSMENT & PLAN NOTE
He had quit smoking previously due to hospitalization but unfortunately he has resumed smoking.  He reports to smoking around 1/2 pack per day.  Strongly encouraged him to quit smoking.

## 2023-04-12 NOTE — ASSESSMENT & PLAN NOTE
Unfortunately he started smoking again.  I strongly advised him to quit smoking.  Continue follow-up with pulmonologist.

## 2023-04-12 NOTE — PROGRESS NOTES
I N T E R N A L  M E D I C I N E    J U N O H  K I M,  M D      ENCOUNTER DATE:  04/12/2023    Mark L Sturgeon / 67 y.o. / male    CHIEF COMPLAINT / REASON FOR OFFICE VISIT     Diabetes, Hyperlipidemia, and Hypertension      ASSESSMENT & PLAN     Problem List Items Addressed This Visit        High    Type 2 diabetes mellitus without complication, without long-term current use of insulin (HCC) - Primary (Chronic)    Current Assessment & Plan     Lab Results   Component Value Date    HGBA1C 6.50 (H) 11/08/2022    HGBA1C 6.0 (H) 05/04/2022    HGBA1C 6.1 (H) 11/29/2021        Currently no medication.   Check A1c today.          Relevant Medications    Lancets (ONETOUCH ULTRASOFT) lancets    Other Relevant Orders    Hemoglobin A1c       Medium    Hyperlipidemia (Chronic)    Overview     Continue rosuvastatin 10 mg qd.          Relevant Medications    rosuvastatin (CRESTOR) 10 MG tablet    Coronary artery disease involving native coronary artery of native heart without angina pectoris (Chronic)    Overview     Noted on heart cath in 2014    Continue ASA 81 mg qd, statin therapy.          Essential hypertension (Chronic)    Overview     Continue losartan 25 mg qd.          Relevant Medications    losartan (COZAAR) 25 MG tablet    Pulmonary fibrosis (Chronic)    Overview     *Guillaume         Current Assessment & Plan     Unfortunately he started smoking again.  I strongly advised him to quit smoking.  Continue follow-up with pulmonologist.         Relevant Medications    budesonide 0.25 MG/2ML suspension    formoterol (PERFOROMIST) 20 MCG/2ML nebulizer solution    revefenacin (YUPELRI) 175 MCG/3ML nebulizer solution       Low    Cigarette nicotine dependence with nicotine-induced disorder (Chronic)    Current Assessment & Plan     He had quit smoking previously due to hospitalization but unfortunately he has resumed smoking.  He reports to smoking around 1/2 pack per day.  Strongly encouraged him to quit smoking.       "    Orders Placed This Encounter   Procedures   • Hemoglobin A1c     No orders of the defined types were placed in this encounter.      SUMMARY/DISCUSSION  •       Next Appointment with me: Visit date not found    Return in about 6 months (around 10/12/2023) for Diabetes, Hypertension, Hyperlipidemia.      VITAL SIGNS     Vitals:    04/12/23 1011   BP: 126/80   Pulse: 73   Temp: 97.3 °F (36.3 °C)   SpO2: 95%   Weight: 93 kg (205 lb)   Height: 180.3 cm (71\")       BP Readings from Last 3 Encounters:   04/12/23 126/80   11/08/22 122/70   05/04/22 122/78     Wt Readings from Last 3 Encounters:   04/12/23 93 kg (205 lb)   11/08/22 91.2 kg (201 lb)   05/04/22 87.1 kg (192 lb)     Body mass index is 28.59 kg/m².    Blood pressure readings recorded on patient flowsheet:       View : No data to display.                   MEDICATIONS AT THE TIME OF OFFICE VISIT     Current Outpatient Medications on File Prior to Visit   Medication Sig   • budesonide 0.25 MG/2ML suspension    • Cholecalciferol (VITAMIN D PO) Take 1 tablet by mouth Daily.   • Cyanocobalamin (VITAMIN B-12 PO) Take 1 tablet by mouth Daily.   • formoterol (PERFOROMIST) 20 MCG/2ML nebulizer solution    • Lancets (ONETOUCH ULTRASOFT) lancets Use to test glucose once daily.  Dx DM2  E11.9   • losartan (COZAAR) 25 MG tablet TAKE 1 TABLET BY MOUTH DAILY   • multivitamin with minerals tablet tablet    • pantoprazole (PROTONIX) 40 MG EC tablet TAKE 1 TABLET BY MOUTH TWICE DAILY BEFORE MEALS   • revefenacin (YUPELRI) 175 MCG/3ML nebulizer solution    • rosuvastatin (CRESTOR) 10 MG tablet TAKE 1 TABLET BY MOUTH EVERY NIGHT     No current facility-administered medications on file prior to visit.          HISTORY OF PRESENT ILLNESS     Patient denies any significant problems.  He denies chest pains increased cough or shortness of breath.  Unfortunately he has resumed smoking about 1/2 pack per day.  Previously he had quit smoking due to hospitalization.  Currently his " diabetes is diet controlled.  Last A1c was higher at 6.5 off metformin.  He does not check his blood sugar at home.  Blood pressure remained stable on losartan and hyperlipidemia remains overall stable on rosuvastatin 10 mg.  He sees his pulmonologist for pulmonary fibrosis and COPD.      Patient Care Team:  Edmundo Tyler MD as PCP - General (Internal Medicine)  Baylee Galaviz MD as Consulting Physician (Pulmonary Disease)  Cuate Lawson MD as Consulting Physician (Gastroenterology)    REVIEW OF SYSTEMS     Review of Systems       PHYSICAL EXAMINATION     Physical Exam  Feet:      Comments: Diabetic Foot Exam Performed and Monofilament Test Performed     Monofilament test is normal.       General: No acute distress  Psych: Normal thought and judgment   Cardiovascular Rate: normal. Rhythm: regular. Heart sounds: normal   Lungs: coarse BS bilaterally without wheezing or rales       REVIEWED DATA     Labs:     Lab Results   Component Value Date     11/08/2022    K 4.7 11/08/2022    CALCIUM 10.1 11/08/2022    AST 14 11/08/2022    ALT 9 11/08/2022    BUN 15 11/08/2022    CREATININE 0.88 11/08/2022    CREATININE 0.64 (L) 02/26/2022    CREATININE 0.53 (L) 02/25/2022    EGFRIFNONA 124 02/26/2022    EGFRIFAFRI 127 08/26/2021       Lab Results   Component Value Date    HGBA1C 6.50 (H) 11/08/2022    HGBA1C 6.0 (H) 05/04/2022    HGBA1C 6.1 (H) 11/29/2021       Lab Results   Component Value Date    LDL 69 11/08/2022    LDL 40 11/29/2021    LDL 34 01/18/2021    HDL 64 (H) 11/08/2022    HDL 59 11/29/2021    TRIG 88 11/08/2022    TRIG 86 11/29/2021       Lab Results   Component Value Date    TSH 2.590 08/26/2021    TSH 2.050 11/25/2019    TSH 2.870 10/02/2018    FREET4 1.23 08/26/2021    FREET4 1.32 11/25/2019    FREET4 1.20 10/02/2018       Lab Results   Component Value Date    WBC 10.42 11/08/2022    HGB 14.6 11/08/2022     11/08/2022       Lab Results   Component Value Date    MALBCMONIKAO 11 05/04/2022           Imaging:     CT Chest Hi Resolution Diagnostic (02/28/2023 08:18)    COMPARISON: 03/01/2022     FINDINGS: The central airways are patent without endobronchial lesion.  Prone high-resolution images demonstrate bilateral subpleural  reticulation with a mild upper lobe predominance. There is no  significant associated groundglass density. Mild traction  bronchiolectasis is present in these areas. Stable to minimal  progression when compared to prior imaging from 03/2022. Upper lobe  predominant emphysematous change is also present. There is bilateral  diffuse bronchial wall thickening with areas of bronchial narrowing  caused by mucous plugging and/or secretions. No honeycombing is  identified. Retained secretions is seen within the trachea. No pleural  or pericardial effusion. There is no pathological mediastinal  lymphadenopathy. Calcified plaque is seen within the coronary arteries  and thoracic aorta.     The visualized upper abdomen is unremarkable. No pathological axillary  lymphadenopathy. Bones are unremarkable.     IMPRESSION:  Mild upper lobe predominance of bilateral subpleural  reticulation. No honeycombing. The findings suggest an alternate  diagnosis of interstitial lung disease.    Medical Tests:           Summary of old records / correspondence / consultant report:           Request outside records:             *Examiner was wearing KN95 mask during the entire duration of the visit. Patient was masked the entire time. Minimum social distance of 6 ft maintained entire visit except if physical contact was necessary as documented.       Template created by Mandy Tyler MD

## 2023-07-10 ENCOUNTER — TELEPHONE (OUTPATIENT)
Dept: URGENT CARE | Facility: CLINIC | Age: 68
End: 2023-07-10
Payer: MEDICARE

## 2023-08-03 RX ORDER — PANTOPRAZOLE SODIUM 40 MG/1
40 TABLET, DELAYED RELEASE ORAL DAILY
Qty: 90 TABLET | Refills: 3 | Status: SHIPPED | OUTPATIENT
Start: 2023-08-03

## 2023-10-19 ENCOUNTER — OFFICE VISIT (OUTPATIENT)
Dept: INTERNAL MEDICINE | Age: 68
End: 2023-10-19
Payer: MEDICARE

## 2023-10-19 VITALS
OXYGEN SATURATION: 96 % | SYSTOLIC BLOOD PRESSURE: 120 MMHG | BODY MASS INDEX: 28.7 KG/M2 | HEART RATE: 69 BPM | TEMPERATURE: 97.1 F | HEIGHT: 71 IN | WEIGHT: 205 LBS | DIASTOLIC BLOOD PRESSURE: 70 MMHG

## 2023-10-19 DIAGNOSIS — E66.3 OVERWEIGHT (BMI 25.0-29.9): ICD-10-CM

## 2023-10-19 DIAGNOSIS — E78.5 HYPERLIPIDEMIA, UNSPECIFIED HYPERLIPIDEMIA TYPE: Chronic | ICD-10-CM

## 2023-10-19 DIAGNOSIS — Z00.00 MEDICARE ANNUAL WELLNESS VISIT, SUBSEQUENT: ICD-10-CM

## 2023-10-19 DIAGNOSIS — N52.9 ERECTILE DYSFUNCTION, UNSPECIFIED ERECTILE DYSFUNCTION TYPE: ICD-10-CM

## 2023-10-19 DIAGNOSIS — E11.9 TYPE 2 DIABETES MELLITUS WITHOUT COMPLICATION, WITHOUT LONG-TERM CURRENT USE OF INSULIN: Primary | Chronic | ICD-10-CM

## 2023-10-19 DIAGNOSIS — E11.9 TYPE 2 DIABETES MELLITUS WITHOUT COMPLICATION, WITHOUT LONG-TERM CURRENT USE OF INSULIN: Primary | ICD-10-CM

## 2023-10-19 DIAGNOSIS — E78.5 HYPERLIPIDEMIA, UNSPECIFIED HYPERLIPIDEMIA TYPE: ICD-10-CM

## 2023-10-19 DIAGNOSIS — I10 ESSENTIAL HYPERTENSION: ICD-10-CM

## 2023-10-19 DIAGNOSIS — I10 ESSENTIAL HYPERTENSION: Chronic | ICD-10-CM

## 2023-10-19 RX ORDER — SILDENAFIL CITRATE 20 MG/1
TABLET ORAL
Qty: 20 TABLET | Refills: 2 | Status: SHIPPED | OUTPATIENT
Start: 2023-10-19

## 2023-10-19 NOTE — ASSESSMENT & PLAN NOTE
Lab Results   Component Value Date    HGBA1C 6.10 (H) 04/12/2023    HGBA1C 6.50 (H) 11/08/2022    HGBA1C 6.0 (H) 05/04/2022    CREATININE 0.88 11/08/2022    LDL 69 11/08/2022    MALBCRERATIO 11 05/04/2022      Diet controlled. Check A1c today.

## 2023-10-19 NOTE — ASSESSMENT & PLAN NOTE
Interested in erectile dysfunction medications.   Sildenafil     Discussed absolute contraindication with nitroglycerine. Instructed to go to ED if he has unwanted prolonged or painful erection, loss of vision/hearing.

## 2023-10-19 NOTE — PROGRESS NOTES
I N T E R N A L  M E D I C I N E    J U N O H  K I M,  M D      ENCOUNTER DATE:  10/19/2023    Mark L Sturgeon / 68 y.o. / male    MEDICARE ANNUAL WELLNESS VISIT       Chief Complaint:    Chief Complaint   Patient presents with    Diabetes    Hypertension    Hyperlipidemia       Patient's general assessment of his health since a year ago:     - Compared to one year ago, he feels his physical health is:   [x] About the same  [] Better  [] Little worse  [] Significantly worse  []     - Compared to one year ago, he feels his mental health is   [x] About the same  [] Better  [] Little worse  [] Significantly worse  []     HPI for other active medical problems:     Still smokes 1/2 pack per day ; sees pulmonologist for COPD and PF.       HISTORY       Recent Hospitalizations:    Recent hospitalization?:     If YES, location, date, and diagnoses:     Location:   Date:   Principle Discharge Dx:   Secondary Dx:       Patient Care Team:    Patient Care Team:  Edmundo Tyler MD as PCP - General (Internal Medicine)  Baylee Galaviz MD as Consulting Physician (Pulmonary Disease)  Cuate Lawson MD as Consulting Physician (Gastroenterology)  Adalid Felton MD as Consulting Physician (Ophthalmology)      Allergies:  Codeine    Medications:  Current Outpatient Medications on File Prior to Visit   Medication Sig Dispense Refill    budesonide 0.25 MG/2ML suspension       formoterol (PERFOROMIST) 20 MCG/2ML nebulizer solution Take 2 mL by nebulization Daily.      Lancets (ONETOUCH ULTRASOFT) lancets Use to test glucose once daily.  Dx DM2  E11.9 100 each 11    losartan (COZAAR) 25 MG tablet TAKE 1 TABLET BY MOUTH DAILY 90 tablet 3    multivitamin with minerals tablet tablet       pantoprazole (PROTONIX) 40 MG EC tablet Take 1 tablet by mouth Daily. 90 tablet 3    revefenacin (YUPELRI) 175 MCG/3ML nebulizer solution       rosuvastatin (CRESTOR) 10 MG tablet TAKE 1 TABLET BY MOUTH EVERY NIGHT 90 tablet 3    Cholecalciferol  (VITAMIN D PO) Take 1 tablet by mouth Daily. (Patient not taking: Reported on 10/19/2023)      Cyanocobalamin (VITAMIN B-12 PO) Take 1 tablet by mouth Daily. (Patient not taking: Reported on 10/19/2023)       No current facility-administered medications on file prior to visit.        PFSH:     The following portions of the patient's history were reviewed and updated as appropriate: Allergies / Current Medications / Past Medical History / Surgical History / Social History / Family History    Problem List:  Patient Active Problem List   Diagnosis    Primary osteoarthritis of hand    COPD (chronic obstructive pulmonary disease)    Cigarette nicotine dependence with nicotine-induced disorder    Type 2 diabetes mellitus without complication, without long-term current use of insulin    Hyperlipidemia    Gastroesophageal reflux disease with esophagitis    Coronary artery disease involving native coronary artery of native heart without angina pectoris    Essential hypertension    Abnormal CT lung screening    Abnormal weight loss    Pulmonary fibrosis    Alcohol use    Duodenal ulcer    Erectile dysfunction    Overweight (BMI 25.0-29.9)       Past Medical History:  Past Medical History:   Diagnosis Date    Arthritis     Asthma     COPD (chronic obstructive pulmonary disease)     Gastroesophageal reflux disease with esophagitis     Gastrointestinal hemorrhage with melena 02/20/2022    GI (gastrointestinal bleed) Feb 2020    History of transfusion     Hyperlipidemia     Pulmonary nodule        Past Surgical History:  Past Surgical History:   Procedure Laterality Date    COLONOSCOPY N/A 01/22/2018    Diverticulosis in the sigmoid colon, in the descending colon, in the transverse colon and in the ascending colon, non-bleeding internal hemorrhoids    ENDOSCOPY N/A 01/22/2018    LA Grade C reflux esophagitis, small hiatal hernia, erythematous mucosa in the stomach, duodenal erosions without bleeding    ENDOSCOPY N/A 02/21/2022     Procedure: ESOPHAGOGASTRODUODENOSCOPY with biopsies, ulcer injected with 2cc epi and cauterized with gold probe;  Surgeon: Leroy Lion MD;  Location:  [facility];  Service: Gastroenterology;  Laterality: N/A;  pre:  GI bleed  post:  duodenal bulb ulcer, duodenal bulb polyp    ENDOSCOPY N/A 04/13/2022    Procedure: ESOPHAGOGASTRODUODENOSCOPY with biopsies;  Surgeon: Cuate Lawson MD;  Location:  [facility];  Service: Gastroenterology;  Laterality: N/A;  Pr: follow up gastric ulcers  Post: gastritis    HAND SURGERY      THROAT SURGERY  2012    polyp removal    TONSILLECTOMY      UPPER GASTROINTESTINAL ENDOSCOPY  Feb 2022    VASECTOMY         Social History:  Social History     Socioeconomic History    Marital status:      Spouse name: Zeenat*    Number of children: 2   Tobacco Use    Smoking status: Light Smoker     Packs/day: 0.50     Years: 45.00     Additional pack years: 0.00     Total pack years: 22.50     Types: Cigarettes    Smokeless tobacco: Never    Tobacco comments:     3 packs smoked since February 20, 2022   Vaping Use    Vaping Use: Never used   Substance and Sexual Activity    Alcohol use: Yes     Alcohol/week: 8.0 standard drinks of alcohol     Types: 8 Cans of beer per week     Comment: no alcohol since feb.20, 2022, formerly drank a 12 pack a week    Drug use: Yes     Frequency: 1.0 times per week     Types: Marijuana    Sexual activity: Yes     Partners: Female     Birth control/protection: Vasectomy       Family History:  Family History   Problem Relation Age of Onset    Esophageal cancer Mother 68        smoker    Asthma Mother     Heart attack Father 50        smoker    Asthma Sister         smoker    Lung disease Brother     Asthma Maternal Grandmother     Diabetes Maternal Grandmother     Heart attack Maternal Grandfather 75    Colon cancer Neg Hx     Prostate cancer Neg Hx          PATIENT ASSESSMENT     Vitals:  Vitals:    10/19/23 1058   BP: 120/70   Pulse: 69  "  Temp: 97.1 °F (36.2 °C)   SpO2: 96%   Weight: 93 kg (205 lb)   Height: 179.1 cm (70.5\")       BP Readings from Last 3 Encounters:   10/19/23 120/70   07/06/23 141/84   06/29/23 134/86     Wt Readings from Last 3 Encounters:   10/19/23 93 kg (205 lb)   07/06/23 92.5 kg (204 lb)   06/29/23 92.5 kg (204 lb)      Body mass index is 29 kg/m².    Blood pressure readings recorded on patient flowsheet:       No data to display                    Review of Systems:    Review of Systems      Physical Exam:    Physical Exam      Reviewed Data:    Labs:   Lab Results   Component Value Date     11/08/2022    K 4.7 11/08/2022    CALCIUM 10.1 11/08/2022    AST 14 11/08/2022    ALT 9 11/08/2022    BUN 15 11/08/2022    CREATININE 0.88 11/08/2022    CREATININE 0.64 (L) 02/26/2022    CREATININE 0.53 (L) 02/25/2022    EGFRIFNONA 124 02/26/2022    EGFRIFAFRI 127 08/26/2021       Lab Results   Component Value Date    HGBA1C 6.10 (H) 04/12/2023    HGBA1C 6.50 (H) 11/08/2022    HGBA1C 6.0 (H) 05/04/2022    MICROALBUR 17.2 05/04/2022       Lab Results   Component Value Date    LDL 69 11/08/2022    LDL 40 11/29/2021    LDL 34 01/18/2021    HDL 64 (H) 11/08/2022    TRIG 88 11/08/2022    CHOLHDLRATIO 2.33 11/08/2022       Lab Results   Component Value Date    TSH 2.590 08/26/2021    FREET4 1.23 08/26/2021          Lab Results   Component Value Date    WBC 10.42 11/08/2022    HGB 14.6 11/08/2022    HGB 11.6 (L) 05/04/2022    HGB 9.3 (L) 03/03/2022     11/08/2022                   Lab Results   Component Value Date    PSA 1.0 01/18/2021    PSA 0.908 11/25/2019    PSA 1.500 10/02/2018       Imaging:          Medical Tests:          Screening for Glaucoma:  Previous screening for glaucoma?:   [x] YES  [] NO  []     Hearing Loss Screen:  Finger Rub Hearing Test (right ear):   [x] PASSED  [] FAILED  [] BORDERLINE  [] HAS HEARING AID  [] USING HEARING AID  [] NOT USING HEARING AID  []     Finger Rub Hearing Test (left ear):   [x] " PASSED  [] FAILED  [] BORDERLINE  [] HAS HEARING AID  [] USING HEARING AID  [] NOT USING HEARING AID  []     Urinary Incontinence Screen:  Episodes of urinary incontinence? :  [] YES  [x] NO  [] N/A  [] WILL NEED FOLLOWUP  []       Depression Screen:      10/19/2023    11:03 AM   PHQ-2/PHQ-9 Depression Screening   Little Interest or Pleasure in Doing Things 0-->not at all   Feeling Down, Depressed or Hopeless 0-->not at all   PHQ-9: Brief Depression Severity Measure Score 0        PHQ-2:   [x] 0 -      NOT DEPRESSED  [] 1 -      NOT DEPRESSED  [] 2 -      NOT DEPRESSED  [] 3-6 -  DEPRESSED (PROCEED TO PHQ-9)  [] N/A - HAS DEPRESSION AND IS ON TREATMENT  [] N/A - HAS DEPRESSION AND IS NOT ON TREATMENT    PHQ-9:   [x] 0         NEGATIVE SCREENING FOR DEPRESSION  [] 1-4      MINIMAL DEPRESSION  [] 5-9      MILD DEPRESSIONNOT DEPRESSED  [] 10-14  MODERATE DEPRESSION  [] 15-19  MODERATELY SEVERE DEPRESSION  [] 20-27  SEVERE DEPRESSION    FUNCTIONAL, FALL RISK, & COGNITIVE SCREENING (Components below):    DATA:        10/19/2023    11:00 AM   Functional & Cognitive Status   Do you have difficulty preparing food and eating? No   Do you have difficulty bathing yourself, getting dressed or grooming yourself? No   Do you have difficulty using the toilet? No   Do you have difficulty moving around from place to place? No   Do you have trouble with steps or getting out of a bed or a chair? No   Current Diet Diabetic Diet   Dental Exam Up to date   Eye Exam Up to date   Exercise (times per week) 3 times per week   Current Exercises Include Walking   Do you need help using the phone?  No   Are you deaf or do you have serious difficulty hearing?  No   Do you need help to go to places out of walking distance? No   Do you need help shopping? No   Do you need help preparing meals?  No   Do you need help with housework?  No   Do you need help with laundry? No   Do you need help taking your medications? No   Do you need help managing  money? No   Do you ever drive or ride in a car without wearing a seat belt? No   Who do you live with? Spouse   Do you have difficulty concentrating, remembering or making decisions? No         A) Assessment of Functional Ability:  (Assessment of ability to perform ADL's (showering/bathing, using toilet, dressing, feeding self, moving self around) and IADL's (use telephone, shop, prepare food, housekeep, do laundry, transport independently, take medications independently, and handle finances)    Degree of functional impairment: (based on assessment noted above)  [] NONE  [x] MILD  [] MODERATE  [] SEVERE  [] VERY SEVERE  []     B) Assessment of Fall Risk:  [] LOW  [x] MODERATE  [] HIGH  [] VERY HIGH     Need for further evaluation of gait, strength, and balance? :  [x] YES  [] NO    Timed Up and Go (TUG):   (>= 12 seconds indicates high risk for falling)    Observable abnormalities included:  [x] NORMAL GAIT   [] SLOW CAUTIOUS PACE  [] IMPAIRED BALANCE  [] SHORT STRIDES  [] MINIMAL ARM SWING  [] UNSTEADY (MILD)  [] UNSTEADY (MODERATE)  [] UNSTEADY (SEVERE)  [] SHUFFLING GAIT  [] USES ASSISTIVE DEVICE (CANE) PROPERLY  [] USES ASSISTIVE DEVICE (WALKER) PROPERLY  [] USES ASSISTIVE DEVICE (CANE) PROPERLY  [] DOES NOT USE ASSISTIVE DEVICE PROPERLY  [] IN WHEELCHAIR   [] NOT ABLE TO BE TESTED DUE TO SAFETY CONCERNS  []     C. Assessment of Cognitive Function:    Mini-Cog Test:     1) Registration (3 objects):     [x] YES  [] NO   [] N/A HAS DOCUMENTED DEMENTIA     2) Number of objects recalled:   [x] 3  [] 2  [] 1  [] 0     3) Clock Draw: Passed? :   [] YES  [] NO   [x] N/A  [] N/A HAS DOCUMENTED DEMENTIA     Further evaluation required? :   [] YES  [x] NO   [] CLOSE OBSERVATION NEEDED   [] SCHEDULE APPOINTMENT TO EVALUATE FURTHER   [] CONTINUE REGULAR FOLLOWUP AND MANAGEMENT   []     COUNSELING       A. Identification of Health Risk Factors:    Risk factors include: cardiovascular risk factors, tobacco use, and  obesity      B. Age-Appropriate Screening Schedule:  (Refer to the list below for future screening recommendations based on patient's age, sex and/or medical conditions. Orders for these recommended tests are listed in the plan section. The patient has been provided with a written plan)    Health Maintenance Topics  Health Maintenance   Topic Date Due    BMI FOLLOWUP  Never done    URINE MICROALBUMIN  05/04/2023    TDAP/TD VACCINES (3 - Td or Tdap) 05/29/2023    DIABETIC EYE EXAM  07/08/2023    COVID-19 Vaccine (6 - 2023-24 season) 09/01/2023    HEMOGLOBIN A1C  10/12/2023    LIPID PANEL  11/08/2023    LUNG CANCER SCREENING  02/28/2024    PROSTATE CANCER SCREENING  03/15/2024    DIABETIC FOOT EXAM  04/12/2024    ANNUAL WELLNESS VISIT  10/19/2024    COLORECTAL CANCER SCREENING  01/22/2028    HEPATITIS C SCREENING  Completed    INFLUENZA VACCINE  Completed    Pneumococcal Vaccine 65+  Completed    AAA SCREEN (ONE-TIME)  Completed    ZOSTER VACCINE  Completed       Health Maintenance Topics Due or Over-Due  Health Maintenance Due   Topic Date Due    BMI FOLLOWUP  Never done    URINE MICROALBUMIN  05/04/2023    TDAP/TD VACCINES (3 - Td or Tdap) 05/29/2023    DIABETIC EYE EXAM  07/08/2023    COVID-19 Vaccine (6 - 2023-24 season) 09/01/2023    HEMOGLOBIN A1C  10/12/2023         C. Advanced Care Planning:    Advance Care Planning   ACP discussion was held with the patient during this visit. Patient has an advance directive (not in EMR), copy requested.       D. Patient Self-Management and Personalized Health Advice:    He has been provided with personalized counseling/information (including brochures/handouts) about:     -- optimizing diet/nutrition plans, weight management, tobacco cessation, reducing risk for cardiovascular disease (heart, stroke, vascular), and reducing risk for cardiac/vascular events with pre-existing disease      He has been recommended for the following preventative services which has been performed  today, will be ordered today or ordered/performed on upcoming follow-up visit:     -- SMOKING cessation counseling completed, NUTRITION counseling provided, CARDIOVASCULAR disease risk reduction counseling performed, URINARY incontinence assessment done, PROSTATE CANCER screening (discussed and PSA ordered +/- GAYATHRI performed), **COLORECTAL cancer screening (colonoscopy/Cologuard discussed or ordered), LUNG CANCER screening DISCUSSED, COVID-19 vaccination (and/or booster) recommendations provided      E. Miscellaneous Items: [id number]    -Aspirin use counseling: Does not need ASA (and currently is not on it)    -Discussed BMI with him. The BMI is above average; BMI management plan is completed (discussed plans for weight loss)    -Reviewed use of high risk medication in the elderly: YES    -Reviewed for potential of harmful drug interactions in the elderly: YES        WRAP UP       Assessment & Plan:    1) MEDICARE ANNUAL WELLNESS VISIT    2) OTHER MEDICAL CONDITIONS ADDRESSED TODAY:            Problem List Items Addressed This Visit          High    Type 2 diabetes mellitus without complication, without long-term current use of insulin - Primary (Chronic)    Current Assessment & Plan     Lab Results   Component Value Date    HGBA1C 6.10 (H) 04/12/2023    HGBA1C 6.50 (H) 11/08/2022    HGBA1C 6.0 (H) 05/04/2022    CREATININE 0.88 11/08/2022    LDL 69 11/08/2022    MALBCRERATIO 11 05/04/2022      Diet controlled. Check A1c today.          Relevant Medications    Lancets (ONETOUCH ULTRASOFT) lancets       Medium    Hyperlipidemia (Chronic)    Overview     Continue rosuvastatin 10 mg qd.          Relevant Medications    rosuvastatin (CRESTOR) 10 MG tablet    Essential hypertension (Chronic)    Overview     Continue losartan 25 mg qd.          Relevant Medications    losartan (COZAAR) 25 MG tablet       Low    Erectile dysfunction (Chronic)    Current Assessment & Plan     Interested in erectile dysfunction medications.    Sildenafil     Discussed absolute contraindication with nitroglycerine. Instructed to go to ED if he has unwanted prolonged or painful erection, loss of vision/hearing.           Relevant Medications    sildenafil (REVATIO) 20 MG tablet       Unprioritized    Overweight (BMI 25.0-29.9)     Other Visit Diagnoses       Medicare annual wellness visit, subsequent                        No orders of the defined types were placed in this encounter.      Discussion / Summary:        Medications as of TODAY:              Current Outpatient Medications   Medication Sig Dispense Refill    budesonide 0.25 MG/2ML suspension       formoterol (PERFOROMIST) 20 MCG/2ML nebulizer solution Take 2 mL by nebulization Daily.      Lancets (ONETOUCH ULTRASOFT) lancets Use to test glucose once daily.  Dx DM2  E11.9 100 each 11    losartan (COZAAR) 25 MG tablet TAKE 1 TABLET BY MOUTH DAILY 90 tablet 3    multivitamin with minerals tablet tablet       pantoprazole (PROTONIX) 40 MG EC tablet Take 1 tablet by mouth Daily. 90 tablet 3    revefenacin (YUPELRI) 175 MCG/3ML nebulizer solution       rosuvastatin (CRESTOR) 10 MG tablet TAKE 1 TABLET BY MOUTH EVERY NIGHT 90 tablet 3    Cholecalciferol (VITAMIN D PO) Take 1 tablet by mouth Daily. (Patient not taking: Reported on 10/19/2023)      Cyanocobalamin (VITAMIN B-12 PO) Take 1 tablet by mouth Daily. (Patient not taking: Reported on 10/19/2023)      sildenafil (REVATIO) 20 MG tablet Take 2-5 tablets 1 hour before sexual activity as needed 20 tablet 2     No current facility-administered medications for this visit.         FOLLOW-UP:            Return in about 6 months (around 4/19/2024) for Reassess chronic medical problems, **SCHEDULE COMB AWV/MED FU FOR NEXT YR (30 MIN)**.              Future Appointments   Date Time Provider Department Center   4/19/2024 11:00 AM Edmundo Tyler MD [facility]   10/22/2024  1:00 PM Edmundo Tyler MD [facility]           After Visit Summary (AVS)  including the Personalized Prevention  Plan Services (PPPS) was either printed and given to the patient at check-out today and/or sent to [facility] for review.

## 2023-10-19 NOTE — PROGRESS NOTES
"    I N T E R N A L  M E D I C I N E    J U N O H  K I M,  M D      ENCOUNTER DATE:  10/19/2023    Mark L Sturgeon / 68 y.o. / male    CHIEF COMPLAINT / REASON FOR OFFICE VISIT     Diabetes, Hypertension, and Hyperlipidemia      ASSESSMENT & PLAN     Problem List Items Addressed This Visit          High    Type 2 diabetes mellitus without complication, without long-term current use of insulin - Primary (Chronic)    Current Assessment & Plan     Lab Results   Component Value Date    HGBA1C 6.10 (H) 04/12/2023    HGBA1C 6.50 (H) 11/08/2022    HGBA1C 6.0 (H) 05/04/2022    CREATININE 0.88 11/08/2022    LDL 69 11/08/2022    MALBCRERATIO 11 05/04/2022      Diet controlled. Check A1c today.          Relevant Medications    Lancets (ONETOUCH ULTRASOFT) lancets       Medium    Hyperlipidemia (Chronic)    Overview     Continue rosuvastatin 10 mg qd.          Relevant Medications    rosuvastatin (CRESTOR) 10 MG tablet    Essential hypertension (Chronic)    Overview     Continue losartan 25 mg qd.          Relevant Medications    losartan (COZAAR) 25 MG tablet     Other Visit Diagnoses       Erectile dysfunction, unspecified erectile dysfunction type        Relevant Medications    sildenafil (REVATIO) 20 MG tablet    Medicare annual wellness visit, subsequent              No orders of the defined types were placed in this encounter.    New Medications Ordered This Visit   Medications    sildenafil (REVATIO) 20 MG tablet     Sig: Take 2-5 tablets 1 hour before sexual activity as needed     Dispense:  20 tablet     Refill:  2       SUMMARY/DISCUSSION        Next Appointment with me: Visit date not found    Return in about 6 months (around 4/19/2024) for Reassess chronic medical problems, **SCHEDULE COMB AWV/MED FU FOR NEXT YR (30 MIN)**.        VITAL SIGNS     Vitals:    10/19/23 1058   BP: 120/70   Pulse: 69   Temp: 97.1 °F (36.2 °C)   SpO2: 96%   Weight: 93 kg (205 lb)   Height: 179.1 cm (70.5\")       BP Readings from Last 3 " Encounters:   10/19/23 120/70   07/06/23 141/84   06/29/23 134/86     Wt Readings from Last 3 Encounters:   10/19/23 93 kg (205 lb)   07/06/23 92.5 kg (204 lb)   06/29/23 92.5 kg (204 lb)     Body mass index is 29 kg/m².    Blood pressure readings recorded on patient flowsheet:       No data to display                MEDICATIONS AT THE TIME OF OFFICE VISIT     Current Outpatient Medications on File Prior to Visit   Medication Sig    budesonide 0.25 MG/2ML suspension     formoterol (PERFOROMIST) 20 MCG/2ML nebulizer solution Take 2 mL by nebulization Daily.    Lancets (ONETOUCH ULTRASOFT) lancets Use to test glucose once daily.  Dx DM2  E11.9    losartan (COZAAR) 25 MG tablet TAKE 1 TABLET BY MOUTH DAILY    multivitamin with minerals tablet tablet     pantoprazole (PROTONIX) 40 MG EC tablet Take 1 tablet by mouth Daily.    revefenacin (YUPELRI) 175 MCG/3ML nebulizer solution     rosuvastatin (CRESTOR) 10 MG tablet TAKE 1 TABLET BY MOUTH EVERY NIGHT    Cholecalciferol (VITAMIN D PO) Take 1 tablet by mouth Daily. (Patient not taking: Reported on 10/19/2023)    Cyanocobalamin (VITAMIN B-12 PO) Take 1 tablet by mouth Daily. (Patient not taking: Reported on 10/19/2023)     No current facility-administered medications on file prior to visit.         HISTORY OF PRESENT ILLNESS     He is no longer taking metformin; diabetes is currently controlled by diet. His last A1c was 6.1 percent. Does not monitor his blood glucose at home. He is up to date on his diabetic eye exam. He is managed on Crestor 10 mg for hyperlipidemia. His blood pressure is well controlled on losartan 25 mg daily. On nebulizer treatments for maintenance of COPD and pulmonary fibrosis. No notable change in his breathing. Continues to smoke a half a pack of cigarettes a day. He is scheduled to see Dr. Galaviz in 2 weeks and plans to discuss quitting smoking measures. He is active playing golf and walks some. He denies any cognitive changes or memory problems.  No falls in the last year. Sees his dentist regularly.       He is experiencing erectile dysfunction and is requesting a prescription for Viagra. He denies chest pain and does not take nitroglycerin. Last cardiac catheterization was done in 2014.      He received an influenza and RSV vaccine. Plans to get the COVID-19 booster this Saturday and tetanus vaccine in the near future.     Lab Results   Component Value Date    HGBA1C 6.10 (H) 04/12/2023    HGBA1C 6.50 (H) 11/08/2022    HGBA1C 6.0 (H) 05/04/2022    CREATININE 0.88 11/08/2022    LDL 69 11/08/2022    MALBCRERATIO 11 05/04/2022     Blood sugar readings recorded on patient's flowsheet:       No data to display               93 kg (205 lb)    REVIEW OF SYSTEMS           PHYSICAL EXAMINATION     Physical Exam  General: No acute distress  Psych: Normal thought and judgment   Cardiovascular Rate: normal. Rhythm: regular. Heart sounds: normal   Lungs: coarse BS bilaterally with scant expiratory wheezes       REVIEWED DATA     Labs:       Lab Results   Component Value Date     11/08/2022    K 4.7 11/08/2022    CALCIUM 10.1 11/08/2022    AST 14 11/08/2022    ALT 9 11/08/2022    BUN 15 11/08/2022    CREATININE 0.88 11/08/2022    CREATININE 0.64 (L) 02/26/2022    CREATININE 0.53 (L) 02/25/2022    EGFRIFNONA 124 02/26/2022    EGFRIFAFRI 127 08/26/2021       Lab Results   Component Value Date    HGBA1C 6.10 (H) 04/12/2023    HGBA1C 6.50 (H) 11/08/2022    HGBA1C 6.0 (H) 05/04/2022       Lab Results   Component Value Date    LDL 69 11/08/2022    LDL 40 11/29/2021    LDL 34 01/18/2021    HDL 64 (H) 11/08/2022    HDL 59 11/29/2021    TRIG 88 11/08/2022    TRIG 86 11/29/2021       Lab Results   Component Value Date    TSH 2.590 08/26/2021    TSH 2.050 11/25/2019    TSH 2.870 10/02/2018    FREET4 1.23 08/26/2021    FREET4 1.32 11/25/2019    FREET4 1.20 10/02/2018       Lab Results   Component Value Date    WBC 10.42 11/08/2022    HGB 14.6 11/08/2022     11/08/2022        Lab Results   Component Value Date    CARLOZ 11 05/04/2022            Imaging:           Medical Tests:           Summary of old records / correspondence / consultant report:           Request outside records:     Transcribed from ambient dictation for Edmundo Tyler MD by Colleen Arellano.  10/19/23   12:13 EDT    Patient or patient representative verbalized consent to the visit recording.  I have personally performed the services described in this document as transcribed by the above individual, and it is both accurate and complete.  Edmundo Tyler MD  10/19/2023  16:49 EDT

## 2023-10-20 LAB
ALBUMIN SERPL-MCNC: 4.9 G/DL (ref 3.5–5.2)
ALBUMIN/CREAT UR: 23 MG/G CREAT (ref 0–29)
ALBUMIN/GLOB SERPL: 2.1 G/DL
ALP SERPL-CCNC: 66 U/L (ref 39–117)
ALT SERPL-CCNC: 14 U/L (ref 1–41)
AST SERPL-CCNC: 19 U/L (ref 1–40)
BILIRUB SERPL-MCNC: 0.4 MG/DL (ref 0–1.2)
BUN SERPL-MCNC: 16 MG/DL (ref 8–23)
BUN/CREAT SERPL: 19 (ref 7–25)
CALCIUM SERPL-MCNC: 9.7 MG/DL (ref 8.6–10.5)
CHLORIDE SERPL-SCNC: 101 MMOL/L (ref 98–107)
CHOLEST SERPL-MCNC: 129 MG/DL (ref 0–200)
CHOLEST/HDLC SERPL: 2.35 {RATIO}
CO2 SERPL-SCNC: 28.7 MMOL/L (ref 22–29)
CREAT SERPL-MCNC: 0.84 MG/DL (ref 0.76–1.27)
CREAT UR-MCNC: 97 MG/DL
EGFRCR SERPLBLD CKD-EPI 2021: 93.8 ML/MIN/1.73
GLOBULIN SER CALC-MCNC: 2.3 GM/DL
GLUCOSE SERPL-MCNC: 92 MG/DL (ref 65–99)
HDLC SERPL-MCNC: 55 MG/DL (ref 40–60)
LDLC SERPL CALC-MCNC: 62 MG/DL (ref 0–100)
MICROALBUMIN UR-MCNC: 22.6 UG/ML
POTASSIUM SERPL-SCNC: 4.4 MMOL/L (ref 3.5–5.2)
PROT SERPL-MCNC: 7.2 G/DL (ref 6–8.5)
SODIUM SERPL-SCNC: 139 MMOL/L (ref 136–145)
TRIGL SERPL-MCNC: 57 MG/DL (ref 0–150)
VLDLC SERPL CALC-MCNC: 12 MG/DL (ref 5–40)

## 2023-10-20 NOTE — PROGRESS NOTES
MyChart:    Here are the result(s) of your test(s):     Cholesterol level is overall stable    Kidney function, liver labs are electrolytes are stable/normal.     Urine is negative for protein.       Please do not hesitate to contact me if you have questions.

## 2023-10-26 ENCOUNTER — TELEPHONE (OUTPATIENT)
Dept: INTERNAL MEDICINE | Age: 68
End: 2023-10-26
Payer: MEDICARE

## 2023-10-26 NOTE — TELEPHONE ENCOUNTER
----- Message from Edmundo Tyler MD sent at 10/19/2023 11:26 AM EDT -----  Regarding: Obtain diabetic eye exam report from eye doctor  IMPORTANT:  Obtain diabetic eye exam report and scan into chart.

## 2023-11-06 ENCOUNTER — TRANSCRIBE ORDERS (OUTPATIENT)
Dept: SLEEP MEDICINE | Facility: HOSPITAL | Age: 68
End: 2023-11-06
Payer: MEDICARE

## 2023-11-06 ENCOUNTER — TRANSCRIBE ORDERS (OUTPATIENT)
Dept: ADMINISTRATIVE | Facility: HOSPITAL | Age: 68
End: 2023-11-06
Payer: MEDICARE

## 2023-11-06 DIAGNOSIS — F17.210 CIGARETTE SMOKER: Primary | ICD-10-CM

## 2023-11-15 DIAGNOSIS — N52.9 ERECTILE DYSFUNCTION, UNSPECIFIED ERECTILE DYSFUNCTION TYPE: ICD-10-CM

## 2023-11-17 RX ORDER — SILDENAFIL CITRATE 20 MG/1
TABLET ORAL
Qty: 20 TABLET | Refills: 2 | OUTPATIENT
Start: 2023-11-17

## 2023-11-17 NOTE — TELEPHONE ENCOUNTER
Spoke with pt informed was sent to pharmacy previously in oct with 2 refill   Pt will contact pharmacy

## 2023-11-21 ENCOUNTER — TELEPHONE (OUTPATIENT)
Dept: INTERNAL MEDICINE | Age: 68
End: 2023-11-21
Payer: MEDICARE

## 2023-11-21 DIAGNOSIS — N52.9 ERECTILE DYSFUNCTION, UNSPECIFIED ERECTILE DYSFUNCTION TYPE: ICD-10-CM

## 2023-11-21 RX ORDER — SILDENAFIL CITRATE 20 MG/1
TABLET ORAL
Qty: 20 TABLET | Refills: 2 | Status: SHIPPED | OUTPATIENT
Start: 2023-11-21

## 2023-11-21 NOTE — TELEPHONE ENCOUNTER
Caller: Sturgeon, Mark L    Relationship to patient: Self    Best call back number: [phone]     Patient is needing: PATIENT STATES SILDENAFIL REQUIRES A PRIOR AUTHORIZATION. HE HAS NOT BEEN ABLE TO OBTAIN HIS FIRST FILL YET.

## 2024-01-20 DIAGNOSIS — E78.00 PURE HYPERCHOLESTEROLEMIA: Chronic | ICD-10-CM

## 2024-01-20 DIAGNOSIS — I10 ESSENTIAL HYPERTENSION: Chronic | ICD-10-CM

## 2024-01-22 RX ORDER — LOSARTAN POTASSIUM 25 MG/1
25 TABLET ORAL DAILY
Qty: 90 TABLET | Refills: 1 | Status: SHIPPED | OUTPATIENT
Start: 2024-01-22 | End: 2024-01-29 | Stop reason: SDUPTHER

## 2024-01-22 RX ORDER — ROSUVASTATIN CALCIUM 10 MG/1
10 TABLET, COATED ORAL NIGHTLY
Qty: 90 TABLET | Refills: 1 | Status: SHIPPED | OUTPATIENT
Start: 2024-01-22 | End: 2024-01-29 | Stop reason: SDUPTHER

## 2024-01-29 DIAGNOSIS — I10 ESSENTIAL HYPERTENSION: Chronic | ICD-10-CM

## 2024-01-29 DIAGNOSIS — E78.00 PURE HYPERCHOLESTEROLEMIA: Chronic | ICD-10-CM

## 2024-01-29 RX ORDER — LOSARTAN POTASSIUM 25 MG/1
25 TABLET ORAL DAILY
Qty: 90 TABLET | Refills: 1 | Status: SHIPPED | OUTPATIENT
Start: 2024-01-29

## 2024-01-29 RX ORDER — ROSUVASTATIN CALCIUM 10 MG/1
10 TABLET, COATED ORAL NIGHTLY
Qty: 90 TABLET | Refills: 1 | Status: SHIPPED | OUTPATIENT
Start: 2024-01-29

## 2024-02-29 ENCOUNTER — HOSPITAL ENCOUNTER (OUTPATIENT)
Dept: CT IMAGING | Facility: HOSPITAL | Age: 69
Discharge: HOME | End: 2024-02-29
Admitting: INTERNAL MEDICINE
Payer: MEDICARE

## 2024-02-29 DIAGNOSIS — F17.210 CIGARETTE SMOKER: ICD-10-CM

## 2024-02-29 PROCEDURE — 71271 CT THORAX LUNG CANCER SCR C-: CPT

## 2024-03-07 ENCOUNTER — TRANSCRIBE ORDERS (OUTPATIENT)
Dept: ADMINISTRATIVE | Facility: HOSPITAL | Age: 69
End: 2024-03-07
Payer: MEDICARE

## 2024-04-19 ENCOUNTER — OFFICE VISIT (OUTPATIENT)
Dept: INTERNAL MEDICINE | Age: 69
End: 2024-04-19
Payer: MEDICARE

## 2024-04-19 VITALS
DIASTOLIC BLOOD PRESSURE: 86 MMHG | BODY MASS INDEX: 28.84 KG/M2 | TEMPERATURE: 97.3 F | HEART RATE: 76 BPM | OXYGEN SATURATION: 94 % | WEIGHT: 206 LBS | HEIGHT: 71 IN | SYSTOLIC BLOOD PRESSURE: 122 MMHG

## 2024-04-19 DIAGNOSIS — E78.5 HYPERLIPIDEMIA, UNSPECIFIED HYPERLIPIDEMIA TYPE: Chronic | ICD-10-CM

## 2024-04-19 DIAGNOSIS — I25.10 CORONARY ARTERY DISEASE INVOLVING NATIVE CORONARY ARTERY OF NATIVE HEART WITHOUT ANGINA PECTORIS: Chronic | ICD-10-CM

## 2024-04-19 DIAGNOSIS — F17.219 CIGARETTE NICOTINE DEPENDENCE WITH NICOTINE-INDUCED DISORDER: Chronic | ICD-10-CM

## 2024-04-19 DIAGNOSIS — J44.9 CHRONIC OBSTRUCTIVE PULMONARY DISEASE, UNSPECIFIED COPD TYPE: Chronic | ICD-10-CM

## 2024-04-19 DIAGNOSIS — I10 ESSENTIAL HYPERTENSION: Chronic | ICD-10-CM

## 2024-04-19 DIAGNOSIS — J84.10 PULMONARY FIBROSIS: Chronic | ICD-10-CM

## 2024-04-19 DIAGNOSIS — E11.9 TYPE 2 DIABETES MELLITUS WITHOUT COMPLICATION, WITHOUT LONG-TERM CURRENT USE OF INSULIN: Primary | Chronic | ICD-10-CM

## 2024-04-19 LAB — HBA1C MFR BLD: 6.1 % (ref 4.8–5.6)

## 2024-04-19 NOTE — ASSESSMENT & PLAN NOTE
Advised to quit smoking. Continue inhalers by nebulizer. Follow-up with pulmonologist. Monitor O2 sats at home.

## 2024-04-19 NOTE — PROGRESS NOTES
I N T E R N A L  M E D I C I N E    J U N O H  K I M,  M D      ENCOUNTER DATE:  04/19/2024    Mark L Sturgeon / 68 y.o. / male    CHIEF COMPLAINT / REASON FOR OFFICE VISIT     Diabetes, Hypertension, and Hyperlipidemia      ASSESSMENT & PLAN     Problem List Items Addressed This Visit          High    Type 2 diabetes mellitus without complication, without long-term current use of insulin - Primary (Chronic)    Overview     Diet controlled          Current Assessment & Plan     Lab Results   Component Value Date    HGBA1C 6.10 (H) 04/12/2023    HGBA1C 6.50 (H) 11/08/2022    HGBA1C 6.0 (H) 05/04/2022    CREATININE 0.84 10/19/2023    LDL 62 10/19/2023    MALBCRERATIO 23 10/19/2023      Check A1c.          Relevant Medications    Lancets (ONETOUCH ULTRASOFT) lancets    Other Relevant Orders    Hemoglobin A1c       Medium    COPD (chronic obstructive pulmonary disease) (Chronic)    Overview     *Guillaume         Current Assessment & Plan     Advised to quit smoking. Continue inhalers by nebulizer. Follow-up with pulmonologist. Monitor O2 sats at home.          Relevant Medications    budesonide 0.25 MG/2ML suspension    formoterol (PERFOROMIST) 20 MCG/2ML nebulizer solution    revefenacin (YUPELRI) 175 MCG/3ML nebulizer solution    Hyperlipidemia (Chronic)    Overview     Continue rosuvastatin 10 mg qd.          Relevant Medications    rosuvastatin (CRESTOR) 10 MG tablet    Coronary artery disease involving native coronary artery of native heart without angina pectoris (Chronic)    Overview     Noted on heart cath in 2014    Continue ASA 81 mg qd, statin therapy.          Relevant Medications    sildenafil (REVATIO) 20 MG tablet    Essential hypertension (Chronic)    Overview     Continue losartan 25 mg qd.          Relevant Medications    losartan (COZAAR) 25 MG tablet    Pulmonary fibrosis (Chronic)    Overview     *Guillaume         Relevant Medications    budesonide 0.25 MG/2ML suspension    formoterol (PERFOROMIST) 20  "MCG/2ML nebulizer solution    revefenacin (YUPELRI) 175 MCG/3ML nebulizer solution       Low    Cigarette nicotine dependence with nicotine-induced disorder (Chronic)    Current Assessment & Plan     Still smoking heavily. Advised to quit.           Orders Placed This Encounter   Procedures    Hemoglobin A1c     No orders of the defined types were placed in this encounter.      SUMMARY/DISCUSSION      Next Appointment with me: Visit date not found    Return in about 6 months (around 10/19/2024) for **SCHEDULE COMBINED AWV AND MEDICAL F/U**.      VITAL SIGNS     Vitals:    04/19/24 1057   BP: 122/86   Pulse: 76   Temp: 97.3 °F (36.3 °C)   SpO2: 94%   Weight: 93.4 kg (206 lb)   Height: 179.1 cm (70.5\")       BP Readings from Last 3 Encounters:   04/19/24 122/86   10/19/23 120/70   07/06/23 141/84     Wt Readings from Last 3 Encounters:   04/19/24 93.4 kg (206 lb)   10/19/23 93 kg (205 lb)   07/06/23 92.5 kg (204 lb)     Body mass index is 29.14 kg/m².    Blood pressure readings recorded on patient flowsheet:       No data to display                  MEDICATIONS AT THE TIME OF OFFICE VISIT     Current Outpatient Medications on File Prior to Visit   Medication Sig    budesonide 0.25 MG/2ML suspension     Cholecalciferol (VITAMIN D PO) Take 1 tablet by mouth Daily.    Cyanocobalamin (VITAMIN B-12 PO) Take 1 tablet by mouth Daily.    formoterol (PERFOROMIST) 20 MCG/2ML nebulizer solution Take 2 mL by nebulization Daily.    Lancets (ONETOUCH ULTRASOFT) lancets Use to test glucose once daily.  Dx DM2  E11.9    losartan (COZAAR) 25 MG tablet Take 1 tablet by mouth Daily.    multivitamin with minerals tablet tablet     pantoprazole (PROTONIX) 40 MG EC tablet Take 1 tablet by mouth Daily.    revefenacin (YUPELRI) 175 MCG/3ML nebulizer solution     rosuvastatin (CRESTOR) 10 MG tablet Take 1 tablet by mouth Every Night.    sildenafil (REVATIO) 20 MG tablet Take 2-5 tablets 1 hour before sexual activity as needed     No current " facility-administered medications on file prior to visit.          HISTORY OF PRESENT ILLNESS     Diet controlled diabetes.   Lab Results   Component Value Date    HGBA1C 6.10 (H) 04/12/2023    HGBA1C 6.50 (H) 11/08/2022    HGBA1C 6.0 (H) 05/04/2022      Hypertension stable on losartan 25 mg.     On nebulizer treatment for COPD and pulmonary fibrosis. Still smokes daily.     CAD without angina.     Patient Care Team:  Edmundo Tyler MD as PCP - General (Internal Medicine)  Baylee Galaviz MD as Consulting Physician (Pulmonary Disease)  Cuate Lawson MD as Consulting Physician (Gastroenterology)  Adalid Felton MD as Consulting Physician (Ophthalmology)  Ruddy Urbina MD as Consulting Physician (Urology)    REVIEW OF SYSTEMS     Review of Systems       PHYSICAL EXAMINATION     Physical Exam  General: No acute distress  Psych: Normal thought and judgment   Cardiovascular Rate: normal. Rhythm: regular. Heart sounds: normal   Lungs: bilateral exp wheezes; no rales       REVIEWED DATA     Labs:     Lab Results   Component Value Date     10/19/2023    K 4.4 10/19/2023    CALCIUM 9.7 10/19/2023    AST 19 10/19/2023    ALT 14 10/19/2023    BUN 16 10/19/2023    CREATININE 0.84 10/19/2023    CREATININE 0.88 11/08/2022    CREATININE 0.64 (L) 02/26/2022    EGFRRESULT 93.8 10/19/2023       Lab Results   Component Value Date    HGBA1C 6.10 (H) 04/12/2023    HGBA1C 6.50 (H) 11/08/2022    HGBA1C 6.0 (H) 05/04/2022       Lab Results   Component Value Date    LDL 62 10/19/2023    LDL 69 11/08/2022    LDL 40 11/29/2021    HDL 55 10/19/2023    HDL 64 (H) 11/08/2022    TRIG 57 10/19/2023    TRIG 88 11/08/2022       Lab Results   Component Value Date    TSH 2.590 08/26/2021    TSH 2.050 11/25/2019    TSH 2.870 10/02/2018    FREET4 1.23 08/26/2021    FREET4 1.32 11/25/2019    FREET4 1.20 10/02/2018       Lab Results   Component Value Date    WBC 10.42 11/08/2022    HGB 14.6 11/08/2022     11/08/2022        Lab Results   Component Value Date    CARLOZ 23 10/19/2023       Lab Results   Component Value Date    PSA 1.0 01/18/2021    PSA 0.908 11/25/2019    PSA 1.500 10/02/2018        Imaging:     CT Chest Low Dose Cancer Screening WO (02/29/2024 07:59)   FINDINGS: Heart is normal in size. Moderate coronary artery  calcifications. No pericardial fluid. Nondilated main pulmonary artery  and thoracic aorta. No mediastinal/hilar adenopathy. Decompressed  esophagus.     Small amount of aspirated secretions in the trachea and right mainstem  bronchus. Remaining trachea and main bronchi are patent. Moderate, right  greater than left, biapical predominant paraseptal and centrilobular  emphysema. Unchanged 7 x 4 mm smoothly marginated oval solid nodule  along the minor fissure (series 3/image 81). Multiple very similar  bilateral predominantly calcified sub-6 mm solid pulmonary nodules. No  pleural effusion.     IMPRESSION:  1. Lung RADS 2-benign. Recommend continued annual screening  2. CTDI 2.2 mGy. DLP 83 mGycm.       Medical Tests:           Summary of old records / correspondence / consultant report:           Request outside records:

## 2024-04-19 NOTE — ASSESSMENT & PLAN NOTE
Lab Results   Component Value Date    HGBA1C 6.10 (H) 04/12/2023    HGBA1C 6.50 (H) 11/08/2022    HGBA1C 6.0 (H) 05/04/2022    CREATININE 0.84 10/19/2023    LDL 62 10/19/2023    MALBCRERATIO 23 10/19/2023      Check A1c.

## 2024-07-23 DIAGNOSIS — I10 ESSENTIAL HYPERTENSION: Chronic | ICD-10-CM

## 2024-07-23 DIAGNOSIS — E78.00 PURE HYPERCHOLESTEROLEMIA: Chronic | ICD-10-CM

## 2024-07-23 RX ORDER — LOSARTAN POTASSIUM 25 MG/1
25 TABLET ORAL DAILY
Qty: 90 TABLET | Refills: 0 | Status: SHIPPED | OUTPATIENT
Start: 2024-07-23

## 2024-07-23 RX ORDER — ROSUVASTATIN CALCIUM 10 MG/1
10 TABLET, COATED ORAL NIGHTLY
Qty: 90 TABLET | Refills: 0 | Status: SHIPPED | OUTPATIENT
Start: 2024-07-23

## 2024-07-23 RX ORDER — PANTOPRAZOLE SODIUM 40 MG/1
40 TABLET, DELAYED RELEASE ORAL DAILY
Qty: 90 TABLET | Refills: 0 | OUTPATIENT
Start: 2024-07-23

## 2024-07-30 ENCOUNTER — OFFICE VISIT (OUTPATIENT)
Dept: GASTROENTEROLOGY | Facility: CLINIC | Age: 69
End: 2024-07-30
Payer: MEDICARE

## 2024-07-30 VITALS
HEIGHT: 71 IN | TEMPERATURE: 97.8 F | BODY MASS INDEX: 29.18 KG/M2 | WEIGHT: 208.4 LBS | DIASTOLIC BLOOD PRESSURE: 81 MMHG | HEART RATE: 87 BPM | SYSTOLIC BLOOD PRESSURE: 127 MMHG

## 2024-07-30 DIAGNOSIS — K21.9 GASTROESOPHAGEAL REFLUX DISEASE, UNSPECIFIED WHETHER ESOPHAGITIS PRESENT: Primary | ICD-10-CM

## 2024-07-30 PROCEDURE — 99213 OFFICE O/P EST LOW 20 MIN: CPT | Performed by: NURSE PRACTITIONER

## 2024-07-30 PROCEDURE — 1160F RVW MEDS BY RX/DR IN RCRD: CPT | Performed by: NURSE PRACTITIONER

## 2024-07-30 PROCEDURE — 3079F DIAST BP 80-89 MM HG: CPT | Performed by: NURSE PRACTITIONER

## 2024-07-30 PROCEDURE — 3074F SYST BP LT 130 MM HG: CPT | Performed by: NURSE PRACTITIONER

## 2024-07-30 PROCEDURE — 1159F MED LIST DOCD IN RCRD: CPT | Performed by: NURSE PRACTITIONER

## 2024-07-30 RX ORDER — PANTOPRAZOLE SODIUM 40 MG/1
40 TABLET, DELAYED RELEASE ORAL DAILY
Qty: 90 TABLET | Refills: 3 | Status: SHIPPED | OUTPATIENT
Start: 2024-07-30

## 2024-07-30 NOTE — PROGRESS NOTES
Chief Complaint   Patient presents with    Heartburn       HPI    Mark L Sturgeon is a  69 y.o. male here for a follow up visit for GERD.    This patient follows with Dr. Lawson and myself.    Past medical history of arthritis, asthma, COPD, diabetes, hyperlipidemia along with duodenal ulcer.    On visit today reports adequate control of GERD symptoms on once daily dosing pantoprazole.  Denies nausea, vomiting, abdominal pain, dysphagia or odynophagia.  His appetite is good.  His weight is stable.    No diarrhea, constipation or rectal bleeding.    He is due for screening colonoscopy in 2028.    Past Medical History:   Diagnosis Date    Arthritis     Asthma     COPD (chronic obstructive pulmonary disease)     Diabetes mellitus     Gastroesophageal reflux disease with esophagitis     Gastrointestinal hemorrhage with melena 02/20/2022    GI (gastrointestinal bleed) Feb 2020    History of transfusion     Hyperlipidemia     Pulmonary nodule        Past Surgical History:   Procedure Laterality Date    COLONOSCOPY N/A 01/22/2018    Diverticulosis in the sigmoid colon, in the descending colon, in the transverse colon and in the ascending colon, non-bleeding internal hemorrhoids    ENDOSCOPY N/A 01/22/2018    LA Grade C reflux esophagitis, small hiatal hernia, erythematous mucosa in the stomach, duodenal erosions without bleeding    ENDOSCOPY N/A 02/21/2022    Procedure: ESOPHAGOGASTRODUODENOSCOPY with biopsies, ulcer injected with 2cc epi and cauterized with gold probe;  Surgeon: Leroy Lion MD;  Location: [facility] ENDOSCOPY;  Service: Gastroenterology;  Laterality: N/A;  pre:  GI bleed  post:  duodenal bulb ulcer, duodenal bulb polyp    ENDOSCOPY N/A 04/13/2022    Procedure: ESOPHAGOGASTRODUODENOSCOPY with biopsies;  Surgeon: Cuate Lawson MD;  Location: [facility] ENDOSCOPY;  Service: Gastroenterology;  Laterality: N/A;  Pr: follow up gastric ulcers  Post: gastritis    HAND SURGERY      THROAT SURGERY  2012    polyp  removal    TONSILLECTOMY      UPPER GASTROINTESTINAL ENDOSCOPY  Feb 2022    VASECTOMY         Scheduled Meds:     Continuous Infusions: No current facility-administered medications for this visit.      PRN Meds:     Allergies   Allergen Reactions    Codeine Nausea Only       Social History     Socioeconomic History    Marital status:      Spouse name: Zeenat*    Number of children: 2   Tobacco Use    Smoking status: Light Smoker     Current packs/day: 0.50     Average packs/day: 0.5 packs/day for 45.0 years (22.5 ttl pk-yrs)     Types: Cigarettes    Smokeless tobacco: Never    Tobacco comments:     3 packs smoked since February 20, 2022   Vaping Use    Vaping status: Never Used   Substance and Sexual Activity    Alcohol use: Yes     Alcohol/week: 8.0 standard drinks of alcohol     Types: 8 Cans of beer per week     Comment: no alcohol since feb.20, 2022, formerly drank a 12 pack a week    Drug use: Not Currently     Frequency: 1.0 times per week     Types: Marijuana    Sexual activity: Yes     Partners: Female     Birth control/protection: Vasectomy       Family History   Problem Relation Age of Onset    Esophageal cancer Mother 68        smoker    Asthma Mother     Heart attack Father 50        smoker    Asthma Sister         smoker    Lung disease Brother     Asthma Maternal Grandmother     Diabetes Maternal Grandmother     Heart attack Maternal Grandfather 75    Colon cancer Neg Hx     Prostate cancer Neg Hx        Review of Systems   HENT:  Negative for trouble swallowing.    Gastrointestinal: Negative.        Vitals:    07/30/24 0822   BP: 127/81   Pulse: 87   Temp: 97.8 °F (36.6 °C)       Physical Exam  Constitutional:       Appearance: He is well-developed.   Abdominal:      General: Bowel sounds are normal. There is no distension.      Palpations: Abdomen is soft. There is no mass.      Tenderness: There is no abdominal tenderness. There is no guarding.      Hernia: No hernia is present.   Skin:      General: Skin is warm and dry.      Capillary Refill: Capillary refill takes less than 2 seconds.   Neurological:      Mental Status: He is alert and oriented to person, place, and time.   Psychiatric:         Behavior: Behavior normal.     Assessment    Diagnoses and all orders for this visit:    1. Gastroesophageal reflux disease, unspecified whether esophagitis present (Primary)    Other orders  -     pantoprazole (PROTONIX) 40 MG EC tablet; Take 1 tablet by mouth Daily.  Dispense: 90 tablet; Refill: 3       Plan    Stable GERD, continue PPI therapy  Follow an antireflux diet  No alarm symptoms on visit today to warrant endoscopic evaluation  Follow-up 1 year or sooner if needed         ANGELIKA Parra  [facility]  [address] [place] [address]  Office: [phone]

## 2024-10-22 ENCOUNTER — OFFICE VISIT (OUTPATIENT)
Dept: INTERNAL MEDICINE | Age: 69
End: 2024-10-22
Payer: MEDICARE

## 2024-10-22 VITALS
WEIGHT: 211 LBS | TEMPERATURE: 97.1 F | OXYGEN SATURATION: 96 % | BODY MASS INDEX: 29.54 KG/M2 | HEIGHT: 71 IN | SYSTOLIC BLOOD PRESSURE: 124 MMHG | HEART RATE: 86 BPM | DIASTOLIC BLOOD PRESSURE: 80 MMHG

## 2024-10-22 DIAGNOSIS — I25.10 CORONARY ARTERY DISEASE INVOLVING NATIVE CORONARY ARTERY OF NATIVE HEART WITHOUT ANGINA PECTORIS: Chronic | ICD-10-CM

## 2024-10-22 DIAGNOSIS — E78.5 HYPERLIPIDEMIA, UNSPECIFIED HYPERLIPIDEMIA TYPE: Chronic | ICD-10-CM

## 2024-10-22 DIAGNOSIS — I10 ESSENTIAL HYPERTENSION: Chronic | ICD-10-CM

## 2024-10-22 DIAGNOSIS — F17.219 NICOTINE DEPENDENCE, CIGARETTES, WITH UNSPECIFIED NICOTINE-INDUCED DISORDERS: ICD-10-CM

## 2024-10-22 DIAGNOSIS — Z00.00 MEDICARE ANNUAL WELLNESS VISIT, SUBSEQUENT: Primary | ICD-10-CM

## 2024-10-22 DIAGNOSIS — J84.10 PULMONARY FIBROSIS: Chronic | ICD-10-CM

## 2024-10-22 DIAGNOSIS — E11.9 TYPE 2 DIABETES MELLITUS WITHOUT COMPLICATION, WITHOUT LONG-TERM CURRENT USE OF INSULIN: Chronic | ICD-10-CM

## 2024-10-22 PROCEDURE — 1160F RVW MEDS BY RX/DR IN RCRD: CPT | Performed by: INTERNAL MEDICINE

## 2024-10-22 PROCEDURE — G0439 PPPS, SUBSEQ VISIT: HCPCS | Performed by: INTERNAL MEDICINE

## 2024-10-22 PROCEDURE — 3079F DIAST BP 80-89 MM HG: CPT | Performed by: INTERNAL MEDICINE

## 2024-10-22 PROCEDURE — 1170F FXNL STATUS ASSESSED: CPT | Performed by: INTERNAL MEDICINE

## 2024-10-22 PROCEDURE — 99214 OFFICE O/P EST MOD 30 MIN: CPT | Performed by: INTERNAL MEDICINE

## 2024-10-22 PROCEDURE — 1159F MED LIST DOCD IN RCRD: CPT | Performed by: INTERNAL MEDICINE

## 2024-10-22 PROCEDURE — 3044F HG A1C LEVEL LT 7.0%: CPT | Performed by: INTERNAL MEDICINE

## 2024-10-22 PROCEDURE — 3074F SYST BP LT 130 MM HG: CPT | Performed by: INTERNAL MEDICINE

## 2024-10-22 RX ORDER — LOSARTAN POTASSIUM 25 MG/1
25 TABLET ORAL DAILY
Qty: 90 TABLET | Refills: 1 | Status: SHIPPED | OUTPATIENT
Start: 2024-10-22

## 2024-10-22 RX ORDER — ECONAZOLE NITRATE 10 MG/G
CREAM TOPICAL
COMMUNITY
Start: 2024-10-08

## 2024-10-22 RX ORDER — NICOTINE 21 MG/24HR
1 PATCH, TRANSDERMAL 24 HOURS TRANSDERMAL EVERY 24 HOURS
Qty: 28 PATCH | Refills: 1 | Status: SHIPPED | OUTPATIENT
Start: 2024-10-22

## 2024-10-22 NOTE — ASSESSMENT & PLAN NOTE
Smoking cessation counseling provided x 10 minutes  Start NRT with nicotine patch 14 mg x 6 weeks, then 7 mg x 6 weeks   Consider adding bupropion     Advised to watch for depression, anxiety, suicidal ideation

## 2024-10-22 NOTE — PROGRESS NOTES
J  U  N  O  H    K  I  M ,   M  D                  I  N  T  E  R  N  A  L    M  E  D  I  C  I  N  E         ENCOUNTER DATE:  10/22/2024    Mark L Sturgeon / 69 y.o. / male    MEDICARE ANNUAL WELLNESS VISIT       CHIEF COMPLAINT / REASON FOR OFFICE VISIT     Medicare Wellness-subsequent (10/19/23) and chronic medical problems        Patient's general assessment of his health since a year ago:     - Compared to one year ago, he feels his physical health is:   STABLE/SAME    - Compared to one year ago, he feels his mental health is:  STABLE/SAME    Recent Hospitalization (within past 365 days) (NO unless indicated)  No    Patient Care Team:    Patient Care Team:  Edmundo Tyler MD as PCP - General (Internal Medicine)  Baylee Galaviz MD as Consulting Physician (Pulmonary Disease)  Adalid Felton MD as Consulting Physician (Ophthalmology)  Ruddy Urbina MD as Consulting Physician (Urology)  Mary Kay Martel APRN as Nurse Practitioner (Gastroenterology)  Opal Wallace MD (Dermatology)    Allergies:  Codeine    Medications:  Current Outpatient Medications on File Prior to Visit   Medication Sig Dispense Refill    budesonide 0.25 MG/2ML suspension       econazole nitrate (SPECTAZOLE) 1 % cream APPLY CREAM TOPICALLY TO AFFECTED AREA ONCE DAILY      formoterol (PERFOROMIST) 20 MCG/2ML nebulizer solution Take 2 mL by nebulization Daily.      multivitamin with minerals tablet tablet       pantoprazole (PROTONIX) 40 MG EC tablet Take 1 tablet by mouth Daily. 90 tablet 3    revefenacin (YUPELRI) 175 MCG/3ML nebulizer solution       rosuvastatin (CRESTOR) 10 MG tablet TAKE 1 TABLET BY MOUTH ONCE DAILY AT NIGHT 90 tablet 0    sildenafil (REVATIO) 20 MG tablet Take 2-5 tablets 1 hour before sexual activity as needed 20 tablet 2    Cyanocobalamin (VITAMIN B-12 PO) Take 1 tablet by mouth Daily. (Patient not taking: Reported on 10/22/2024)      [DISCONTINUED] Cholecalciferol (VITAMIN D PO) Take  1 tablet by mouth Daily.      [DISCONTINUED] losartan (COZAAR) 25 MG tablet Take 1 tablet by mouth Daily. 90 tablet 0     No current facility-administered medications on file prior to visit.        No opioid medication identified on active medication list. I have reviewed chart for other potential  high risk medication/s and harmful drug interactions in the elderly.      No opioid listed on medication list       HPI FOR OTHER ACTIVE MEDICAL PROBLEMS:    He is smoking 1/2 pack per day and is interested in nicotine patch for smoking cessation.  He is on nebulizer treatments for COPD and pulmonary fibrosis which is managed by his pulmonologist.  Patient reports improvement in his breathing status and improvement in his weight is noted.  He denies chest pain, increasing dyspnea exertion or heart palpitation.  Hypertension remains stable on losartan 25 mg and hyperlipidemia on rosuvastatin 10 mg.  He has diet-controlled type 2 diabetes.    HISTORY     PFSH:     The following portions of the patient's history were reviewed and updated as appropriate: Allergies / Current Medications / Past Medical History / Surgical History / Social History / Family History    Problem List:  Patient Active Problem List   Diagnosis    Primary osteoarthritis of hand    COPD (chronic obstructive pulmonary disease)    Nicotine dependence, cigarettes, with unspecified nicotine-induced disorders    Type 2 diabetes mellitus without complication, without long-term current use of insulin    Hyperlipidemia    Gastroesophageal reflux disease with esophagitis    Coronary artery disease involving native coronary artery of native heart without angina pectoris    Essential hypertension    Abnormal CT lung screening    Abnormal weight loss    Pulmonary fibrosis    Alcohol use    Duodenal ulcer    Erectile dysfunction    Overweight (BMI 25.0-29.9)       Past Medical History:  Past Medical History:   Diagnosis Date    Arthritis     Asthma     COPD (chronic  obstructive pulmonary disease)     Diabetes mellitus     Gastroesophageal reflux disease with esophagitis     Gastrointestinal hemorrhage with melena 02/20/2022    GI (gastrointestinal bleed) Feb 2020    History of transfusion     Hyperlipidemia     Pulmonary nodule        Past Surgical History:  Past Surgical History:   Procedure Laterality Date    COLONOSCOPY N/A 01/22/2018    Diverticulosis in the sigmoid colon, in the descending colon, in the transverse colon and in the ascending colon, non-bleeding internal hemorrhoids    ENDOSCOPY N/A 01/22/2018    LA Grade C reflux esophagitis, small hiatal hernia, erythematous mucosa in the stomach, duodenal erosions without bleeding    ENDOSCOPY N/A 02/21/2022    Procedure: ESOPHAGOGASTRODUODENOSCOPY with biopsies, ulcer injected with 2cc epi and cauterized with gold probe;  Surgeon: Leroy Lion MD;  Location:  [facility];  Service: Gastroenterology;  Laterality: N/A;  pre:  GI bleed  post:  duodenal bulb ulcer, duodenal bulb polyp    ENDOSCOPY N/A 04/13/2022    Procedure: ESOPHAGOGASTRODUODENOSCOPY with biopsies;  Surgeon: Cuate Lawson MD;  Location: [facility];  Service: Gastroenterology;  Laterality: N/A;  Pr: follow up gastric ulcers  Post: gastritis    HAND SURGERY      THROAT SURGERY  2012    polyp removal    TONSILLECTOMY      UPPER GASTROINTESTINAL ENDOSCOPY  Feb 2022    VASECTOMY         Social History:  Social History     Socioeconomic History    Marital status:      Spouse name: Zeenat*    Number of children: 2   Tobacco Use    Smoking status: Every Day     Current packs/day: 1.00     Average packs/day: 1 pack/day for 45.0 years (45.0 ttl pk-yrs)     Types: Cigarettes    Smokeless tobacco: Never    Tobacco comments:     1/2 pack per day currently   Vaping Use    Vaping status: Never Used   Substance and Sexual Activity    Alcohol use: Yes     Alcohol/week: 8.0 standard drinks of alcohol     Types: 8 Cans of beer per week     Comment: 2  "days (4 drinks)    Drug use: Not Currently     Frequency: 1.0 times per week     Types: Marijuana    Sexual activity: Yes     Partners: Female     Birth control/protection: Vasectomy       Family History:  Family History   Problem Relation Age of Onset    Esophageal cancer Mother 68        smoker    Asthma Mother     Heart attack Father 50        smoker    Asthma Sister         smoker    Lung disease Brother         agent orange    Diabetes type II Brother     Asthma Maternal Grandmother     Diabetes Maternal Grandmother     Heart attack Maternal Grandfather 75    Psoriasis Son     Arthritis Son         psoriatic    Obesity Son     No Known Problems Son     Colon cancer Neg Hx     Prostate cancer Neg Hx          PATIENT ASSESSMENT     Vitals:  Vitals:    10/22/24 1300   BP: 124/80   Pulse: 86   Temp: 97.1 °F (36.2 °C)   SpO2: 96%   Weight: 95.7 kg (211 lb)   Height: 180.3 cm (71\")       BP Readings from Last 3 Encounters:   10/22/24 124/80   08/19/24 127/82   07/30/24 127/81     Wt Readings from Last 3 Encounters:   10/22/24 95.7 kg (211 lb)   07/30/24 94.5 kg (208 lb 6.4 oz)   04/19/24 93.4 kg (206 lb)      Body mass index is 29.43 kg/m².     Review of Systems:    Review of Systems  Weight gain   Per HPI   GI negative   BPH sees urologist     Physical Exam:    Physical Exam  Feet:      Right foot:      Skin integrity: Skin integrity normal.      Toenail Condition: Right toenails are abnormally thick. Fungal disease present.     Left foot:      Skin integrity: Skin integrity normal.      Toenail Condition: Left toenails are abnormally thick. Fungal disease present.     Comments: Diabetic Foot Exam Performed and Monofilament Test Performed     Monofilament test is normal.       Alert with normal thought and judgment.   Weight gain noted, looks healthier   Cardiovascular: Normal rate, regular rhythm.  Pulm/Chest: Effort normal, breath sounds decreased throughout lung fields    Reviewed Data:    Labs:   Lab Results "   Component Value Date     10/19/2023    K 4.4 10/19/2023    CALCIUM 9.7 10/19/2023    AST 19 10/19/2023    ALT 14 10/19/2023    BUN 16 10/19/2023    CREATININE 0.84 10/19/2023    CREATININE 0.88 11/08/2022    CREATININE 0.64 (L) 02/26/2022    EGFRIFNONA 124 02/26/2022    EGFRIFAFRI 127 08/26/2021     Lab Results   Component Value Date    HGBA1C 6.10 (H) 04/19/2024    HGBA1C 6.10 (H) 04/12/2023    HGBA1C 6.50 (H) 11/08/2022    MICROALBUR 22.6 10/19/2023     Lab Results   Component Value Date    LDL 62 10/19/2023    LDL 69 11/08/2022    LDL 40 11/29/2021    HDL 55 10/19/2023    TRIG 57 10/19/2023    CHOLHDLRATIO 2.35 10/19/2023     Lab Results   Component Value Date    TSH 2.590 08/26/2021    FREET4 1.23 08/26/2021     Lab Results   Component Value Date    WBC 10.42 11/08/2022    HGB 14.6 11/08/2022    HGB 11.6 (L) 05/04/2022    HGB 9.3 (L) 03/03/2022     11/08/2022     Lab Results   Component Value Date    PSA 1.0 01/18/2021    PSA 0.908 11/25/2019    PSA 1.500 10/02/2018     Lab Results   Component Value Date    CREATININE 0.84 10/19/2023    CREATININE 0.88 11/08/2022    CREATININE 0.64 (L) 02/26/2022    BUN 16 10/19/2023    EGFRIFNONA 124 02/26/2022    EGFRIFAFRI 127 08/26/2021    MICROALBUR 22.6 10/19/2023         Imaging:                Medical Tests:                Summary of old records / correspondence / consultant report:               Request outside records:             SCREENING ASSESSMENT      Screening for Glaucoma:  Previous screening for glaucoma?: Yes    Hearing Loss Screen:  Finger Rub Hearing Test (right ear): Passed  Finger Rub Hearing Test (left ear): Passed    Urinary Incontinence Screen:  Episodes of urinary incontinence? :   NO    Depression Screen:    PHQ-2 Depression Screening  Little interest or pleasure in doing things? Not at all   Feeling down, depressed, or hopeless? Not at all   PHQ-2 Total Score 0         10/22/2024     1:03 PM   PHQ-2/PHQ-9 Depression Screening   Little  interest or pleasure in doing things Not at all   Feeling down, depressed, or hopeless Not at all       PHQ-2: 0 (Not depressed)    PHQ-9: 0 (Negative screening for depression)     FUNCTIONAL, FALL RISK, & COGNITIVE SCREENING     A) Functional and cognitive status based on patient responses:        10/22/2024     1:06 PM   Functional & Cognitive Status   Do you have difficulty preparing food and eating? No   Do you have difficulty bathing yourself, getting dressed or grooming yourself? No   Do you have difficulty using the toilet? No   Do you have difficulty moving around from place to place? No   Do you have trouble with steps or getting out of a bed or a chair? No   Current Diet Unhealthy Diet   Dental Exam Up to date   Eye Exam Up to date   Exercise (times per week) 2 times per week   Current Exercises Include Walking        Exercise Comment golf, yard work, walk   Do you need help using the phone?  No   Are you deaf or do you have serious difficulty hearing?  No   Do you need help to go to places out of walking distance? No   Do you need help shopping? No   Do you need help preparing meals?  No   Do you need help with housework?  No   Do you need help with laundry? No   Do you need help taking your medications? No   Do you need help managing money? No   Do you ever drive or ride in a car without wearing a seat belt? No   Have you felt unusual stress, anger or loneliness in the last month? No   Who do you live with? Spouse   If you need help, do you have trouble finding someone available to you? No   Have you been bothered in the last four weeks by sexual problems? No   Do you have difficulty concentrating, remembering or making decisions? No       B) Assessment of Fall Risk:    Fall Risk Assessment was completed, and patient is at LOW risk for falls.    Need for further evaluation of gait, strength, and balance? : NO    Timed Up and Go (TUG): 8 seconds   (>= 12 seconds indicates high risk for  falling)    Observable abnormalities included:   Normal balance and gait pattern    C. Assessment of Cognitive Function:    Mini-Cog Test:     1) Registration (3 objects): YES   2) Clock Draw: Passed? : Yes   3) Number of objects recalled: 3 (MA)     FURTHER EVALUATION REQUIRED?:   No    **OVERALL ASSESSMENT OF FUNCTIONAL ABILITY**  (Assessment of ability to perform ADL's (showering/bathing, using toilet, dressing, feeding self, moving self around) and IADL's (use telephone, shop, prepare food, housekeep, do laundry, transport independently, take medications independently, and handle finances)    DEGREE OF FUNCTIONAL IMPAIRMENT:   MILD (based on assessment noted above)    ABILITY TO LIVE INDEPENDENTLY:   Capable of living independently     COUNSELING     A. Identification of Health Risk Factors:    Risk factors include:   cardiovascular risk factors  tobacco use  overweight    B. Age-Appropriate Screening Schedule:  (Refer to the list below for future screening recommendations based on patient's age, sex and/or medical conditions. Orders for these recommended tests are listed in the plan section. The patient has been provided with a written plan)    Health Maintenance Topics  Health Maintenance   Topic Date Due    TDAP/TD VACCINES (3 - Td or Tdap) 05/29/2023    PROSTATE CANCER SCREENING  03/15/2024    DIABETIC FOOT EXAM  04/12/2024    LIPID PANEL  10/19/2024    HEMOGLOBIN A1C  10/19/2024    URINE MICROALBUMIN  10/19/2024    BMI FOLLOWUP  10/19/2024    LUNG CANCER SCREENING  02/28/2025    DIABETIC EYE EXAM  07/17/2025    ANNUAL WELLNESS VISIT  10/22/2025    COLORECTAL CANCER SCREENING  01/22/2028    HEPATITIS C SCREENING  Completed    COVID-19 Vaccine  Completed    INFLUENZA VACCINE  Completed    Pneumococcal Vaccine 65+  Completed    AAA SCREEN (ONE-TIME)  Completed    ZOSTER VACCINE  Completed       Health Maintenance Topics Due or Over-Due  Health Maintenance Due   Topic Date Due    TDAP/TD VACCINES (3 - Td or  Tdap) 05/29/2023    PROSTATE CANCER SCREENING  03/15/2024    DIABETIC FOOT EXAM  04/12/2024    LIPID PANEL  10/19/2024    HEMOGLOBIN A1C  10/19/2024    URINE MICROALBUMIN  10/19/2024    BMI FOLLOWUP  10/19/2024         C. Advanced Care Planning:    Advance Care Planning   ACP discussion was held with the patient during this visit. Patient does not have an advance directive, information provided.       D. Patient Self-Management and Personalized Health Advice:    He has been provided with PERSONALIZED COUNSELING/INFORMATION (AVS educational information) about:     optimizing diet/nutrition plans  improving exercise / conditioning  weight management  tobacco cessation  alcohol use  reducing risk for cardiovascular disease (heart, stroke, vascular)  reducing risk for cardiac/vascular events with pre-existing disease  designing advance directives  designating POA      He has been recommended for the following PREVENTATIVE SERVICES which has been performed today, will be ordered today or ordered/performed on upcoming follow-up visit:     LIFESTYLE PREVENTATIVE MEASURES  SMOKING cessation counseling completed  ALCOHOL use counseling completed  NUTRITION counseling provided  EXERCISE counseling provided  EYE exam for DIABETES recommended annually   EYE exam for GLAUCOMA screening recommended annually (or follow-up regularly with eye care specialist for active glaucoma history)  CARDIOVASCULAR disease risk reduction counseling performed  FALL RISK assessment / plan of care completed  URINARY incontinence assessment done    CARDIOVASCULAR SCREENING  N/A / Current    CANCER SCREENING  COLORECTAL cancer: Colonoscopy/Cologuard discussed   LUNG CANCER screening discussed and low dose CT recommended annually  SKIN CANCER: sees dermatologist regularly  PROSTATE CANCER: Screening by UROLOGIST    MISC SCREENING  N/A    VACCINATION/IMMUNIZATION  Tdap / Td administered/recommended/discussed       E. Miscellaneous Items:  [id number]    Aspirin use counseling:  Verify if taking ASA 81 mg currently     Discussed BMI with him. The BMI is above average; BMI management plan is completed (discussed plans for weight loss)    Reviewed use of high risk medication in the elderly: YES    Reviewed for potential of harmful drug interactions in the elderly: YES      WRAP UP     ASSESSMENT & PLAN:    1) MEDICARE ANNUAL WELLNESS VISIT    2) OTHER MEDICAL CONDITIONS ADDRESSED TODAY:            Problem List Items Addressed This Visit          High    Nicotine dependence, cigarettes, with unspecified nicotine-induced disorders (Chronic)    Current Assessment & Plan     Smoking 1/2 pack per day and interested in NRT     Refer to section on COPD     Start 14 mg patch x 6 weeks, then 7 mg x 6 weeks.          Relevant Medications    nicotine (NICODERM CQ) 14 MG/24HR patch    Type 2 diabetes mellitus without complication, without long-term current use of insulin (Chronic)    Overview     Diet controlled          Relevant Orders    Microalbumin / Creatinine Urine Ratio - Urine, Clean Catch    Comprehensive Metabolic Panel    Hemoglobin A1c    CBC & Differential       Medium    Hyperlipidemia (Chronic)    Overview     Continue rosuvastatin 10 mg qd.          Relevant Medications    rosuvastatin (CRESTOR) 10 MG tablet    Other Relevant Orders    Comprehensive Metabolic Panel    Lipid Panel With / Chol / HDL Ratio    Coronary artery disease involving native coronary artery of native heart without angina pectoris (Chronic)    Overview     Noted on heart cath in 2014    Continue ASA 81 mg qd, statin therapy.          Relevant Medications    sildenafil (REVATIO) 20 MG tablet    Essential hypertension (Chronic)    Overview     Continue losartan 25 mg qd.          Relevant Medications    losartan (COZAAR) 25 MG tablet    Pulmonary fibrosis (Chronic)    Overview     *Guillaume         Current Assessment & Plan     Continue revefenacin (Yupelri), budesonide and formoterol  by pulmonologist          Relevant Medications    budesonide 0.25 MG/2ML suspension    formoterol (PERFOROMIST) 20 MCG/2ML nebulizer solution    revefenacin (YUPELRI) 175 MCG/3ML nebulizer solution     Other Visit Diagnoses       Medicare annual wellness visit, subsequent    -  Primary                    Orders Placed This Encounter   Procedures    Microalbumin / Creatinine Urine Ratio - Urine, Clean Catch     Order Specific Question:   Release to patient     Answer:   Routine Release [[id number]]    Comprehensive Metabolic Panel     Order Specific Question:   Release to patient     Answer:   Routine Release [[id number]]    Lipid Panel With / Chol / HDL Ratio     Order Specific Question:   Release to patient     Answer:   Routine Release [[id number]]    Hemoglobin A1c     Order Specific Question:   Release to patient     Answer:   Routine Release [[id number]]    CBC & Differential     Order Specific Question:   Manual Differential     Answer:   No     Order Specific Question:   Release to patient     Answer:   Routine Release [[id number]]        Discussion / Summary:        Medications as of TODAY:              Current Outpatient Medications   Medication Sig Dispense Refill    budesonide 0.25 MG/2ML suspension       econazole nitrate (SPECTAZOLE) 1 % cream APPLY CREAM TOPICALLY TO AFFECTED AREA ONCE DAILY      formoterol (PERFOROMIST) 20 MCG/2ML nebulizer solution Take 2 mL by nebulization Daily.      losartan (COZAAR) 25 MG tablet TAKE 1 TABLET BY MOUTH DAILY 90 tablet 1    multivitamin with minerals tablet tablet       pantoprazole (PROTONIX) 40 MG EC tablet Take 1 tablet by mouth Daily. 90 tablet 3    revefenacin (YUPELRI) 175 MCG/3ML nebulizer solution       rosuvastatin (CRESTOR) 10 MG tablet TAKE 1 TABLET BY MOUTH ONCE DAILY AT NIGHT 90 tablet 0    sildenafil (REVATIO) 20 MG tablet Take 2-5 tablets 1 hour before sexual activity as needed 20 tablet 2    Cyanocobalamin (VITAMIN B-12 PO) Take 1 tablet by mouth  Daily. (Patient not taking: Reported on 10/22/2024)      nicotine (NICODERM CQ) 14 MG/24HR patch Place 1 patch on the skin as directed by provider Daily. 28 patch 1     No current facility-administered medications for this visit.         FOLLOW-UP:            Return in about 6 months (around 4/22/2025) for **SCHEDULE COMB AWV/MED FU FOR NEXT YR (30 MIN)**.            No future appointments.      After Visit Summary (AVS) including the Personalized Prevention  Plan Services (PPPS) was either printed and given to the patient at check-out today and/or sent to [facility] for review.

## 2024-10-22 NOTE — ASSESSMENT & PLAN NOTE
Smoking 1/2 pack per day and interested in NRT     Refer to section on COPD     Start 14 mg patch x 6 weeks, then 7 mg x 6 weeks.

## 2024-10-23 DIAGNOSIS — E78.00 PURE HYPERCHOLESTEROLEMIA: Chronic | ICD-10-CM

## 2024-10-23 LAB
ALBUMIN SERPL-MCNC: 4.5 G/DL (ref 3.5–5.2)
ALBUMIN/CREAT UR: 47 MG/G CREAT (ref 0–29)
ALBUMIN/GLOB SERPL: 1.6 G/DL
ALP SERPL-CCNC: 69 U/L (ref 39–117)
ALT SERPL-CCNC: 17 U/L (ref 1–41)
AST SERPL-CCNC: 18 U/L (ref 1–40)
BASOPHILS # BLD AUTO: 0.06 10*3/MM3 (ref 0–0.2)
BASOPHILS NFR BLD AUTO: 0.6 % (ref 0–1.5)
BILIRUB SERPL-MCNC: 0.4 MG/DL (ref 0–1.2)
BUN SERPL-MCNC: 11 MG/DL (ref 8–23)
BUN/CREAT SERPL: 14.5 (ref 7–25)
CALCIUM SERPL-MCNC: 10.1 MG/DL (ref 8.6–10.5)
CHLORIDE SERPL-SCNC: 99 MMOL/L (ref 98–107)
CHOLEST SERPL-MCNC: 112 MG/DL (ref 0–200)
CHOLEST/HDLC SERPL: 2.2 {RATIO}
CO2 SERPL-SCNC: 30.3 MMOL/L (ref 22–29)
CREAT SERPL-MCNC: 0.76 MG/DL (ref 0.76–1.27)
CREAT UR-MCNC: 84.4 MG/DL
EGFRCR SERPLBLD CKD-EPI 2021: 96.1 ML/MIN/1.73
EOSINOPHIL # BLD AUTO: 0.23 10*3/MM3 (ref 0–0.4)
EOSINOPHIL NFR BLD AUTO: 2.5 % (ref 0.3–6.2)
ERYTHROCYTE [DISTWIDTH] IN BLOOD BY AUTOMATED COUNT: 11.7 % (ref 12.3–15.4)
GLOBULIN SER CALC-MCNC: 2.9 GM/DL
GLUCOSE SERPL-MCNC: 95 MG/DL (ref 65–99)
HBA1C MFR BLD: 6.4 % (ref 4.8–5.6)
HCT VFR BLD AUTO: 44.5 % (ref 37.5–51)
HDLC SERPL-MCNC: 51 MG/DL (ref 40–60)
HGB BLD-MCNC: 15 G/DL (ref 13–17.7)
IMM GRANULOCYTES # BLD AUTO: 0.04 10*3/MM3 (ref 0–0.05)
IMM GRANULOCYTES NFR BLD AUTO: 0.4 % (ref 0–0.5)
LDLC SERPL CALC-MCNC: 43 MG/DL (ref 0–100)
LYMPHOCYTES # BLD AUTO: 2.28 10*3/MM3 (ref 0.7–3.1)
LYMPHOCYTES NFR BLD AUTO: 24.6 % (ref 19.6–45.3)
MCH RBC QN AUTO: 31.4 PG (ref 26.6–33)
MCHC RBC AUTO-ENTMCNC: 33.7 G/DL (ref 31.5–35.7)
MCV RBC AUTO: 93.3 FL (ref 79–97)
MICROALBUMIN UR-MCNC: 39.8 UG/ML
MONOCYTES # BLD AUTO: 0.99 10*3/MM3 (ref 0.1–0.9)
MONOCYTES NFR BLD AUTO: 10.7 % (ref 5–12)
NEUTROPHILS # BLD AUTO: 5.65 10*3/MM3 (ref 1.7–7)
NEUTROPHILS NFR BLD AUTO: 61.2 % (ref 42.7–76)
NRBC BLD AUTO-RTO: 0 /100 WBC (ref 0–0.2)
PLATELET # BLD AUTO: 254 10*3/MM3 (ref 140–450)
POTASSIUM SERPL-SCNC: 4.7 MMOL/L (ref 3.5–5.2)
PROT SERPL-MCNC: 7.4 G/DL (ref 6–8.5)
RBC # BLD AUTO: 4.77 10*6/MM3 (ref 4.14–5.8)
SODIUM SERPL-SCNC: 139 MMOL/L (ref 136–145)
TRIGL SERPL-MCNC: 96 MG/DL (ref 0–150)
VLDLC SERPL CALC-MCNC: 18 MG/DL (ref 5–40)
WBC # BLD AUTO: 9.25 10*3/MM3 (ref 3.4–10.8)

## 2024-10-23 RX ORDER — ROSUVASTATIN CALCIUM 10 MG/1
10 TABLET, COATED ORAL NIGHTLY
Qty: 90 TABLET | Refills: 1 | Status: SHIPPED | OUTPATIENT
Start: 2024-10-23

## 2024-10-23 NOTE — PROGRESS NOTES
MyChart:    Here are the result(s) of your test(s):     Urine is positive for protein. Probably due to hypertension and diabetes.     Kidney function, liver labs are electrolytes are stable/normal.      Cholesterol level is overall stable     A1c for average glucose level is little higher but remains in acceptable level.     Complete blood counts are without significant findings (or stable).       Please do not hesitate to contact me if you have questions.

## 2025-01-13 DIAGNOSIS — N52.9 ERECTILE DYSFUNCTION, UNSPECIFIED ERECTILE DYSFUNCTION TYPE: ICD-10-CM

## 2025-01-14 ENCOUNTER — TRANSCRIBE ORDERS (OUTPATIENT)
Dept: ADMINISTRATIVE | Facility: HOSPITAL | Age: 70
End: 2025-01-14
Payer: MEDICARE

## 2025-01-14 DIAGNOSIS — F17.210 CIGARETTE SMOKER: Primary | ICD-10-CM

## 2025-01-16 RX ORDER — SILDENAFIL CITRATE 20 MG/1
TABLET ORAL
Qty: 20 TABLET | Refills: 2 | Status: SHIPPED | OUTPATIENT
Start: 2025-01-16

## 2025-01-17 ENCOUNTER — TRANSCRIBE ORDERS (OUTPATIENT)
Dept: SLEEP MEDICINE | Facility: HOSPITAL | Age: 70
End: 2025-01-17
Payer: MEDICARE

## 2025-01-17 DIAGNOSIS — F17.200 CURRENT SMOKER: Primary | ICD-10-CM

## 2025-03-03 ENCOUNTER — HOSPITAL ENCOUNTER (OUTPATIENT)
Dept: CT IMAGING | Facility: HOSPITAL | Age: 70
Discharge: HOME | End: 2025-03-03
Admitting: INTERNAL MEDICINE
Payer: MEDICARE

## 2025-03-03 DIAGNOSIS — F17.200 CURRENT SMOKER: ICD-10-CM

## 2025-03-03 PROCEDURE — 71271 CT THORAX LUNG CANCER SCR C-: CPT

## 2025-03-14 ENCOUNTER — TRANSCRIBE ORDERS (OUTPATIENT)
Dept: ADMINISTRATIVE | Facility: HOSPITAL | Age: 70
End: 2025-03-14
Payer: MEDICARE

## 2025-03-14 DIAGNOSIS — F17.210 CIGARETTE SMOKER: Primary | ICD-10-CM

## 2025-04-25 ENCOUNTER — OFFICE VISIT (OUTPATIENT)
Dept: INTERNAL MEDICINE | Age: 70
End: 2025-04-25
Payer: MEDICARE

## 2025-04-25 VITALS
SYSTOLIC BLOOD PRESSURE: 152 MMHG | HEART RATE: 80 BPM | DIASTOLIC BLOOD PRESSURE: 84 MMHG | WEIGHT: 209 LBS | TEMPERATURE: 97.3 F | HEIGHT: 70 IN | OXYGEN SATURATION: 95 % | BODY MASS INDEX: 29.92 KG/M2

## 2025-04-25 DIAGNOSIS — I25.10 CORONARY ARTERY DISEASE INVOLVING NATIVE CORONARY ARTERY OF NATIVE HEART WITHOUT ANGINA PECTORIS: Chronic | ICD-10-CM

## 2025-04-25 DIAGNOSIS — E11.9 TYPE 2 DIABETES MELLITUS WITHOUT COMPLICATION, WITHOUT LONG-TERM CURRENT USE OF INSULIN: Chronic | ICD-10-CM

## 2025-04-25 DIAGNOSIS — I10 ESSENTIAL HYPERTENSION: Primary | Chronic | ICD-10-CM

## 2025-04-25 DIAGNOSIS — E78.00 PURE HYPERCHOLESTEROLEMIA: Chronic | ICD-10-CM

## 2025-04-25 LAB — HBA1C MFR BLD: 6.5 % (ref 4.8–5.6)

## 2025-04-25 RX ORDER — LOSARTAN POTASSIUM 50 MG/1
50 TABLET ORAL DAILY
Qty: 90 TABLET | Refills: 1 | Status: SHIPPED | OUTPATIENT
Start: 2025-04-25

## 2025-04-25 NOTE — ASSESSMENT & PLAN NOTE
BP Readings from Last 3 Encounters:   04/25/25 152/84   03/16/25 137/80   10/22/24 124/80      Increase losartan to 50 mg daily.   Monitor blood pressure at least twice weekly. Send in 1 month.

## 2025-04-25 NOTE — PROGRESS NOTES
J  U  N  O  H    K  I  M ,   M  D                               I  N  T  E  R  N  A  L    M  E  D  I  C  I  N  E         ENCOUNTER DATE:  04/25/2025    Mark L Sturgeon / 69 y.o. / male    OFFICE VISIT ENCOUNTER       CHIEF COMPLAINT / REASON FOR OFFICE VISIT     Diabetes and Hypertension      ASSESSMENT & PLAN     Problem List Items Addressed This Visit          High    Type 2 diabetes mellitus without complication, without long-term current use of insulin (Chronic)    Overview   Diet controlled          Current Assessment & Plan   Lab Results   Component Value Date    HGBA1C 6.40 (H) 10/22/2024    HGBA1C 6.10 (H) 04/19/2024    HGBA1C 6.10 (H) 04/12/2023      Prior A1c was higher. Check lab and consider medication if worse.   Maintain a low sugar/starch/carbohydrate diet.          Relevant Orders    Hemoglobin A1c    Essential hypertension - Primary (Chronic)    Current Assessment & Plan   BP Readings from Last 3 Encounters:   04/25/25 152/84   03/16/25 137/80   10/22/24 124/80      Increase losartan to 50 mg daily.   Monitor blood pressure at least twice weekly. Send in 1 month.          Relevant Medications    losartan (COZAAR) 50 MG tablet       Medium    Hyperlipidemia (Chronic)    Overview   Continue rosuvastatin 10 mg qd.          Relevant Medications    rosuvastatin (CRESTOR) 10 MG tablet    Coronary artery disease involving native coronary artery of native heart without angina pectoris (Chronic)    Overview   Noted on heart cath in 2014    Continue ASA 81 mg qd, statin therapy.          Relevant Medications    sildenafil (REVATIO) 20 MG tablet     Orders Placed This Encounter   Procedures    Hemoglobin A1c     Release to patient:   Routine Release [[id number]]     New Medications Ordered This Visit   Medications    losartan (COZAAR) 50 MG tablet     Sig: Take 1 tablet by mouth Daily.     Dispense:  90 tablet     Refill:  1       SUMMARY/DISCUSSION        TOTAL TIME  "OF ENCOUNTER:        Next Appointment with me: 10/29/2025     Return for Next scheduled followup for AWV.      VITAL SIGNS     Vitals:    04/25/25 1008   BP: 152/84   Pulse: 80   Temp: 97.3 °F (36.3 °C)   SpO2: 95%   Weight: 94.8 kg (209 lb)   Height: 177.8 cm (70\")       BP Readings from Last 3 Encounters:   04/25/25 152/84   03/16/25 137/80   10/22/24 124/80     Wt Readings from Last 3 Encounters:   04/25/25 94.8 kg (209 lb)   03/16/25 95.7 kg (211 lb)   10/22/24 95.7 kg (211 lb)     Body mass index is 29.99 kg/m².    Blood pressure readings recorded on patient flowsheet:       No data to display                  MEDICATIONS AT THE TIME OF OFFICE VISIT     Current Outpatient Medications on File Prior to Visit   Medication Sig    budesonide 0.25 MG/2ML suspension     econazole nitrate (SPECTAZOLE) 1 % cream APPLY CREAM TOPICALLY TO AFFECTED AREA ONCE DAILY    formoterol (PERFOROMIST) 20 MCG/2ML nebulizer solution Take 2 mL by nebulization Daily.    multivitamin with minerals tablet tablet     pantoprazole (PROTONIX) 40 MG EC tablet Take 1 tablet by mouth Daily.    revefenacin (YUPELRI) 175 MCG/3ML nebulizer solution     rosuvastatin (CRESTOR) 10 MG tablet TAKE 1 TABLET BY MOUTH ONCE DAILY AT NIGHT    sildenafil (REVATIO) 20 MG tablet TAKE 2 TO 5 TABLETS BY MOUTH 1 HOUR BEFORE SEXUAL ACTIVITY AS NEEDED    [DISCONTINUED] losartan (COZAAR) 25 MG tablet TAKE 1 TABLET BY MOUTH DAILY    [DISCONTINUED] nicotine (NICODERM CQ) 14 MG/24HR patch Place 1 patch on the skin as directed by provider Daily.    [DISCONTINUED] Cyanocobalamin (VITAMIN B-12 PO) Take 1 tablet by mouth Daily. (Patient not taking: Reported on 4/25/2025)    [DISCONTINUED] promethazine-dextromethorphan (PROMETHAZINE-DM) 6.25-15 MG/5ML syrup 5 ml PO Q 4-6 hours prn cough.  Max:  30 ml per 24 hours. (Patient not taking: Reported on 4/25/2025)     No current facility-administered medications on file prior to visit.          HISTORY OF PRESENT ILLNESS     Has " not been monitoring his blood pressure regularly.  He is on losartan 25 mg daily.  Cholesterol remains stable on rosuvastatin 10 mg.  He has diet-controlled diabetes but last A1c was higher at 6.4.  He has history of coronary artery disease but denies angina.  Still smoking, not on NRT.     Patient Care Team:  Edmundo Tyler MD as PCP - General (Internal Medicine)  Baylee Galaviz MD as Consulting Physician (Pulmonary Disease)  Adalid Felton MD as Consulting Physician (Ophthalmology)  Ruddy Urbina MD as Consulting Physician (Urology)  Mary Kay Martel APRN as Nurse Practitioner (Gastroenterology)  Opal Wallace MD (Dermatology)    REVIEW OF SYSTEMS     Review of Systems       PHYSICAL EXAMINATION     Physical Exam  Alert with normal thought and judgment.   Cardiovascular: Normal rate, regular rhythm.   Lungs: no rales or wheezing       REVIEWED DATA     Labs:     Lab Results   Component Value Date     10/22/2024    K 4.7 10/22/2024    CALCIUM 10.1 10/22/2024    AST 18 10/22/2024    ALT 17 10/22/2024    BUN 11 10/22/2024    CREATININE 0.76 10/22/2024    CREATININE 0.84 10/19/2023    CREATININE 0.88 11/08/2022    EGFR 96.1 10/22/2024     Lab Results   Component Value Date    HGBA1C 6.40 (H) 10/22/2024    HGBA1C 6.10 (H) 04/19/2024    HGBA1C 6.10 (H) 04/12/2023     Lab Results   Component Value Date    MALBCRERATIO 47 (H) 10/22/2024     Lab Results   Component Value Date    LDL 43 10/22/2024    LDL 62 10/19/2023    LDL 69 11/08/2022    HDL 51 10/22/2024    HDL 55 10/19/2023    TRIG 96 10/22/2024    TRIG 57 10/19/2023     Lab Results   Component Value Date    TSH 2.590 08/26/2021    TSH 2.050 11/25/2019    TSH 2.870 10/02/2018    FREET4 1.23 08/26/2021    FREET4 1.32 11/25/2019    FREET4 1.20 10/02/2018     Lab Results   Component Value Date    WBC 9.25 10/22/2024    HGB 15.0 10/22/2024     10/22/2024           Imaging:     CT Chest Low Dose Cancer Screening WO (03/03/2025 10:42)   1. Lung  RADS category: 2, benign based on imaging features or indolent  behavior. Continue annual screening with low-dose chest CT in 12 months.  2. Airways disease, emphysema and fibrotic lung changes, stable.  3. Three-vessel coronary artery atherosclerotic calcification        Medical Tests:               Summary of old records / correspondence / consultant report:             Request outside records:

## 2025-04-25 NOTE — ASSESSMENT & PLAN NOTE
Lab Results   Component Value Date    HGBA1C 6.40 (H) 10/22/2024    HGBA1C 6.10 (H) 04/19/2024    HGBA1C 6.10 (H) 04/12/2023      Prior A1c was higher. Check lab and consider medication if worse.   Maintain a low sugar/starch/carbohydrate diet.

## 2025-05-01 DIAGNOSIS — E78.00 PURE HYPERCHOLESTEROLEMIA: Chronic | ICD-10-CM

## 2025-05-09 DIAGNOSIS — I10 ESSENTIAL HYPERTENSION: Primary | Chronic | ICD-10-CM

## 2025-05-09 RX ORDER — AMLODIPINE BESYLATE 5 MG/1
5 TABLET ORAL DAILY
Qty: 30 TABLET | Refills: 3 | Status: SHIPPED | OUTPATIENT
Start: 2025-05-09

## 2025-05-09 NOTE — TELEPHONE ENCOUNTER
Caller: Sturgeon, Mark L    Relationship: Self    Best call back number: [phone]     What was the call regarding: PATIENT IS CHECKING STATUS OF REFILL. IT IS STILL PENDING. REQUEST CAME IN ON 5/1/25       rosuvastatin (CRESTOR) 10 MG tablet       [id number] - [place], [place] - [address] - [phone] [facility] [phone]  [phone]     PLEASE CALL AND ADVISE

## 2025-05-09 NOTE — ASSESSMENT & PLAN NOTE
Home blood pressure is still high.   Add amlodipine 5 mg daily.   Continue losartan 50 mg.   Send blood pressure readings in 2-4 weeks.

## 2025-05-14 ENCOUNTER — TELEPHONE (OUTPATIENT)
Dept: INTERNAL MEDICINE | Age: 70
End: 2025-05-14
Payer: MEDICARE

## 2025-05-14 DIAGNOSIS — E78.00 PURE HYPERCHOLESTEROLEMIA: Chronic | ICD-10-CM

## 2025-05-14 RX ORDER — ROSUVASTATIN CALCIUM 10 MG/1
10 TABLET, COATED ORAL NIGHTLY
Qty: 90 TABLET | Refills: 1 | Status: SHIPPED | OUTPATIENT
Start: 2025-05-14

## 2025-05-14 RX ORDER — ROSUVASTATIN CALCIUM 10 MG/1
10 TABLET, COATED ORAL
Qty: 90 TABLET | Refills: 1 | OUTPATIENT
Start: 2025-05-14

## 2025-05-14 NOTE — TELEPHONE ENCOUNTER
----- Message from Deysi HODGE sent at 5/14/2025 10:19 AM EDT -----  Patient called and request refill on rosuvastatin, he said he requested that from last week and still pending.

## 2025-06-19 ENCOUNTER — TELEPHONE (OUTPATIENT)
Dept: INTERNAL MEDICINE | Age: 70
End: 2025-06-19
Payer: MEDICARE

## 2025-06-19 NOTE — TELEPHONE ENCOUNTER
Caller: Sturgeon,Debbie    Relationship: Emergency Contact    Best call back number:     [phone]       What orders are you requesting (i.e. lab or imaging): CAROTID  SCAN     In what timeframe would the patient need to come in:     Where will you receive your lab/imaging services:      Additional notes:

## 2025-07-29 RX ORDER — PANTOPRAZOLE SODIUM 40 MG/1
40 TABLET, DELAYED RELEASE ORAL DAILY
Qty: 90 TABLET | Refills: 0 | OUTPATIENT
Start: 2025-07-29

## 2025-08-04 RX ORDER — PANTOPRAZOLE SODIUM 40 MG/1
40 TABLET, DELAYED RELEASE ORAL DAILY
Qty: 90 TABLET | Refills: 0 | OUTPATIENT
Start: 2025-08-04

## 2025-08-08 RX ORDER — PANTOPRAZOLE SODIUM 40 MG/1
40 TABLET, DELAYED RELEASE ORAL DAILY
Qty: 90 TABLET | Refills: 3 | Status: SHIPPED | OUTPATIENT
Start: 2025-08-08

## (undated) DEVICE — BITEBLOCK OMNI BLOC

## (undated) DEVICE — TBG 02 CRUSH RESIST LF CLR 7FT

## (undated) DEVICE — ADAPT CLN BIOGUARD AIR/H2O DISP

## (undated) DEVICE — LN SMPL CO2 SHTRM SD STREAM W/M LUER

## (undated) DEVICE — KT ORCA ORCAPOD DISP STRL

## (undated) DEVICE — SENSR O2 OXIMAX FNGR A/ 18IN NONSTR

## (undated) DEVICE — MSK O2 NONREBRTHR W/VNT LF ADULT 7FT

## (undated) DEVICE — TUBING, SUCTION, 1/4" X 10', STRAIGHT: Brand: MEDLINE

## (undated) DEVICE — CANN O2 ETCO2 FITS ALL CONN CO2 SMPL A/ 7IN DISP LF

## (undated) DEVICE — FRCP BX RADJAW4 NDL 2.8 240CM LG OG BX40

## (undated) DEVICE — MSK PROC CURAPLEX O2 2/ADAPT 7FT

## (undated) DEVICE — THE TORRENT IRRIGATION SCOPE CONNECTOR IS USED WITH THE TORRENT IRRIGATION TUBING TO PROVIDE IRRIGATION FLUIDS SUCH AS STERILE WATER DURING GASTROINTESTINAL ENDOSCOPIC PROCEDURES WHEN USED IN CONJUNCTION WITH AN IRRIGATION PUMP (OR ELECTROSURGICAL UNIT).: Brand: TORRENT

## (undated) DEVICE — CANN NASL CO2 TRULINK W/O2 A/

## (undated) DEVICE — Device: Brand: DEFENDO AIR/WATER/SUCTION AND BIOPSY VALVE

## (undated) DEVICE — BIPOLAR ELECTROHEMOSTASIS CATHETER: Brand: INJECTION GOLD PROBE